# Patient Record
Sex: FEMALE | Race: WHITE | NOT HISPANIC OR LATINO
[De-identification: names, ages, dates, MRNs, and addresses within clinical notes are randomized per-mention and may not be internally consistent; named-entity substitution may affect disease eponyms.]

---

## 2016-12-06 RX ORDER — CEFAZOLIN SODIUM 1 G
2000 VIAL (EA) INJECTION ONCE
Qty: 0 | Refills: 0 | Status: DISCONTINUED | OUTPATIENT
Start: 2017-01-05 | End: 2017-01-20

## 2016-12-06 RX ORDER — SODIUM CHLORIDE 9 MG/ML
3 INJECTION INTRAMUSCULAR; INTRAVENOUS; SUBCUTANEOUS ONCE
Qty: 0 | Refills: 0 | Status: DISCONTINUED | OUTPATIENT
Start: 2017-01-05 | End: 2017-01-20

## 2017-01-03 ENCOUNTER — APPOINTMENT (OUTPATIENT)
Dept: INTERNAL MEDICINE | Facility: CLINIC | Age: 82
End: 2017-01-03

## 2017-01-03 VITALS
HEIGHT: 63 IN | DIASTOLIC BLOOD PRESSURE: 70 MMHG | BODY MASS INDEX: 21.62 KG/M2 | SYSTOLIC BLOOD PRESSURE: 130 MMHG | WEIGHT: 122 LBS

## 2017-01-03 VITALS
WEIGHT: 122 LBS | RESPIRATION RATE: 14 BRPM | DIASTOLIC BLOOD PRESSURE: 70 MMHG | HEIGHT: 63 IN | SYSTOLIC BLOOD PRESSURE: 130 MMHG | HEART RATE: 72 BPM | BODY MASS INDEX: 21.62 KG/M2

## 2017-01-04 NOTE — ASU PATIENT PROFILE, ADULT - LEARNING ASSESSMENT (PATIENT) ADDITIONAL COMMENTS
telephone update - 1-4-17 . pre op instructions & pain management reviewed pt verbalized understanding

## 2017-01-05 ENCOUNTER — OUTPATIENT (OUTPATIENT)
Dept: OUTPATIENT SERVICES | Facility: HOSPITAL | Age: 82
LOS: 1 days | End: 2017-01-05
Payer: MEDICARE

## 2017-01-05 ENCOUNTER — APPOINTMENT (OUTPATIENT)
Dept: SURGICAL ONCOLOGY | Facility: HOSPITAL | Age: 82
End: 2017-01-05

## 2017-01-05 VITALS
HEART RATE: 70 BPM | RESPIRATION RATE: 16 BRPM | SYSTOLIC BLOOD PRESSURE: 122 MMHG | DIASTOLIC BLOOD PRESSURE: 67 MMHG | OXYGEN SATURATION: 97 % | TEMPERATURE: 98 F

## 2017-01-05 VITALS
WEIGHT: 117.95 LBS | HEIGHT: 65 IN | DIASTOLIC BLOOD PRESSURE: 65 MMHG | TEMPERATURE: 98 F | SYSTOLIC BLOOD PRESSURE: 142 MMHG | HEART RATE: 70 BPM | RESPIRATION RATE: 16 BRPM

## 2017-01-05 DIAGNOSIS — Z98.890 OTHER SPECIFIED POSTPROCEDURAL STATES: Chronic | ICD-10-CM

## 2017-01-05 DIAGNOSIS — C80.1 MALIGNANT (PRIMARY) NEOPLASM, UNSPECIFIED: ICD-10-CM

## 2017-01-05 PROCEDURE — 15220 FTH/GFT FR S/A/L 20 SQ CM/<: CPT

## 2017-01-05 PROCEDURE — 15221 FTH/GFT FR S/A/L EACH ADDL: CPT

## 2017-01-05 PROCEDURE — 97605 NEG PRS WND THER DME<=50SQCM: CPT | Mod: 59

## 2017-01-05 PROCEDURE — 11606 EXC TR-EXT MAL+MARG >4 CM: CPT

## 2017-01-05 PROCEDURE — 88305 TISSUE EXAM BY PATHOLOGIST: CPT | Mod: 26

## 2017-01-05 PROCEDURE — 88305 TISSUE EXAM BY PATHOLOGIST: CPT

## 2017-01-05 RX ORDER — FENTANYL CITRATE 50 UG/ML
50 INJECTION INTRAVENOUS
Qty: 0 | Refills: 0 | Status: DISCONTINUED | OUTPATIENT
Start: 2017-01-05 | End: 2017-01-05

## 2017-01-05 RX ORDER — IBUPROFEN 200 MG
400 TABLET ORAL EVERY 6 HOURS
Qty: 0 | Refills: 0 | Status: DISCONTINUED | OUTPATIENT
Start: 2017-01-05 | End: 2017-01-20

## 2017-01-05 RX ORDER — ONDANSETRON 8 MG/1
4 TABLET, FILM COATED ORAL ONCE
Qty: 0 | Refills: 0 | Status: DISCONTINUED | OUTPATIENT
Start: 2017-01-05 | End: 2017-01-20

## 2017-01-05 RX ORDER — SODIUM CHLORIDE 9 MG/ML
1000 INJECTION, SOLUTION INTRAVENOUS
Qty: 0 | Refills: 0 | Status: DISCONTINUED | OUTPATIENT
Start: 2017-01-05 | End: 2017-01-20

## 2017-01-05 RX ORDER — SODIUM CHLORIDE 9 MG/ML
1000 INJECTION INTRAMUSCULAR; INTRAVENOUS; SUBCUTANEOUS
Qty: 0 | Refills: 0 | Status: DISCONTINUED | OUTPATIENT
Start: 2017-01-05 | End: 2017-01-05

## 2017-01-05 RX ORDER — IBUPROFEN 200 MG
1 TABLET ORAL
Qty: 0 | Refills: 0 | COMMUNITY
Start: 2017-01-05

## 2017-01-05 RX ORDER — ONDANSETRON 8 MG/1
4 TABLET, FILM COATED ORAL ONCE
Qty: 0 | Refills: 0 | Status: DISCONTINUED | OUTPATIENT
Start: 2017-01-05 | End: 2017-01-05

## 2017-01-05 NOTE — BRIEF OPERATIVE NOTE - PRE-OP DX
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

## 2017-01-05 NOTE — BRIEF OPERATIVE NOTE - PROCEDURE
Vacuum assisted closure therapy  01/05/2017  application vac -dressing.  Active  [facility]  Reconstruction procedure  01/05/2017  reconstruction of left lower leg wound with split thickness skin graft from right groin ( donor site)  Active  [facility]  Melanoma excision  01/05/2017  wide excision melanoma anterior left lower extremity  [facility] Excision squamous cell carcinoma of skin  01/05/2017  lesion anterior left lower extremity  Active  [facility]  Reconstruction procedure  01/05/2017  reconstruction of left lower leg wound with split thickness skin graft from right groin ( donor site)  Active  Satish Gunderson  Vacuum assisted closure therapy  01/05/2017  application vac -dressing.  Active  Satish Gundersno

## 2017-01-05 NOTE — BRIEF OPERATIVE NOTE - POST-OP DX
Melanoma of lower limb, left  01/05/2017    Active  Satish Gunderson Squamous cell carcinoma of lower leg, left  01/05/2017    Active  Satish Gunderson

## 2017-01-05 NOTE — ASU DISCHARGE PLAN (ADULT/PEDIATRIC). - NOTIFY
Swelling that continues/Bleeding that does not stop/Pain not relieved by Medications/Fever greater than 101

## 2017-01-05 NOTE — BRIEF OPERATIVE NOTE - SPECIMENS
1.melanoma left lower extremity. 2. deep tissue ( portion of for margins ) 1.lesion left lower extremity. 2. deep tissue ( portion of for margins )

## 2017-01-05 NOTE — ASU DISCHARGE PLAN (ADULT/PEDIATRIC). - MEDICATION SUMMARY - MEDICATIONS TO TAKE
I will START or STAY ON the medications listed below when I get home from the hospital:    ibuprofen 400 mg oral tablet  -- 1 tab(s) by mouth every 6 hours, As needed, pain  -- Indication: For Pain    Cardizem  mg/24 hours oral capsule, extended release  -- 1 cap(s) by mouth once a day  -- Indication: For Per PMD    hydroCHLOROthiazide 25 mg oral tablet  -- 1 tab(s) by mouth once a day  -- Indication: For Per PMD

## 2017-01-05 NOTE — ASU DISCHARGE PLAN (ADULT/PEDIATRIC). - PROCEDURE
wide excision lesion anterior left lower extyremity with recostruction left leg wound with STSG donor site right groin and application VAC dressing.

## 2017-01-10 ENCOUNTER — APPOINTMENT (OUTPATIENT)
Dept: PLASTIC SURGERY | Facility: CLINIC | Age: 82
End: 2017-01-10

## 2017-01-10 VITALS
TEMPERATURE: 97.6 F | SYSTOLIC BLOOD PRESSURE: 154 MMHG | HEART RATE: 98 BPM | BODY MASS INDEX: 21.62 KG/M2 | DIASTOLIC BLOOD PRESSURE: 78 MMHG | OXYGEN SATURATION: 90 % | RESPIRATION RATE: 16 BRPM | WEIGHT: 122 LBS | HEIGHT: 63 IN

## 2017-01-12 ENCOUNTER — APPOINTMENT (OUTPATIENT)
Dept: SURGICAL ONCOLOGY | Facility: CLINIC | Age: 82
End: 2017-01-12

## 2017-01-12 VITALS
WEIGHT: 118 LBS | SYSTOLIC BLOOD PRESSURE: 154 MMHG | RESPIRATION RATE: 14 BRPM | TEMPERATURE: 97.8 F | HEART RATE: 77 BPM | OXYGEN SATURATION: 98 % | DIASTOLIC BLOOD PRESSURE: 74 MMHG | HEIGHT: 63 IN | BODY MASS INDEX: 20.91 KG/M2

## 2017-01-23 ENCOUNTER — APPOINTMENT (OUTPATIENT)
Dept: INTERNAL MEDICINE | Facility: CLINIC | Age: 82
End: 2017-01-23

## 2017-01-24 ENCOUNTER — RX RENEWAL (OUTPATIENT)
Age: 82
End: 2017-01-24

## 2017-01-24 ENCOUNTER — APPOINTMENT (OUTPATIENT)
Dept: PLASTIC SURGERY | Facility: CLINIC | Age: 82
End: 2017-01-24

## 2017-01-24 VITALS
HEIGHT: 63 IN | HEART RATE: 66 BPM | TEMPERATURE: 98.1 F | SYSTOLIC BLOOD PRESSURE: 132 MMHG | RESPIRATION RATE: 14 BRPM | DIASTOLIC BLOOD PRESSURE: 65 MMHG | OXYGEN SATURATION: 98 %

## 2017-01-30 ENCOUNTER — APPOINTMENT (OUTPATIENT)
Dept: DERMATOLOGY | Facility: CLINIC | Age: 82
End: 2017-01-30

## 2017-02-21 ENCOUNTER — APPOINTMENT (OUTPATIENT)
Dept: PLASTIC SURGERY | Facility: CLINIC | Age: 82
End: 2017-02-21

## 2017-02-21 VITALS
TEMPERATURE: 97.7 F | OXYGEN SATURATION: 96 % | HEART RATE: 92 BPM | DIASTOLIC BLOOD PRESSURE: 81 MMHG | BODY MASS INDEX: 21.62 KG/M2 | SYSTOLIC BLOOD PRESSURE: 148 MMHG | HEIGHT: 63 IN | RESPIRATION RATE: 15 BRPM | WEIGHT: 122 LBS

## 2017-04-04 ENCOUNTER — APPOINTMENT (OUTPATIENT)
Dept: PLASTIC SURGERY | Facility: CLINIC | Age: 82
End: 2017-04-04

## 2017-04-04 VITALS
OXYGEN SATURATION: 98 % | SYSTOLIC BLOOD PRESSURE: 113 MMHG | HEIGHT: 63 IN | DIASTOLIC BLOOD PRESSURE: 74 MMHG | BODY MASS INDEX: 21.62 KG/M2 | WEIGHT: 122 LBS | TEMPERATURE: 97.5 F | HEART RATE: 75 BPM | RESPIRATION RATE: 16 BRPM

## 2017-04-25 ENCOUNTER — APPOINTMENT (OUTPATIENT)
Dept: PLASTIC SURGERY | Facility: CLINIC | Age: 82
End: 2017-04-25

## 2017-04-25 VITALS
HEIGHT: 63 IN | HEART RATE: 79 BPM | SYSTOLIC BLOOD PRESSURE: 137 MMHG | OXYGEN SATURATION: 98 % | TEMPERATURE: 97.6 F | WEIGHT: 122 LBS | BODY MASS INDEX: 21.62 KG/M2 | DIASTOLIC BLOOD PRESSURE: 81 MMHG | RESPIRATION RATE: 16 BRPM

## 2017-06-05 ENCOUNTER — APPOINTMENT (OUTPATIENT)
Dept: PLASTIC SURGERY | Facility: CLINIC | Age: 82
End: 2017-06-05

## 2017-06-05 VITALS
DIASTOLIC BLOOD PRESSURE: 71 MMHG | HEART RATE: 103 BPM | RESPIRATION RATE: 16 BRPM | WEIGHT: 121 LBS | TEMPERATURE: 98 F | SYSTOLIC BLOOD PRESSURE: 154 MMHG | OXYGEN SATURATION: 97 % | BODY MASS INDEX: 21.44 KG/M2 | HEIGHT: 63 IN

## 2017-06-05 DIAGNOSIS — L85.8 OTHER SPECIFIED EPIDERMAL THICKENING: ICD-10-CM

## 2017-06-08 ENCOUNTER — LABORATORY RESULT (OUTPATIENT)
Age: 82
End: 2017-06-08

## 2017-06-08 ENCOUNTER — APPOINTMENT (OUTPATIENT)
Dept: SURGICAL ONCOLOGY | Facility: CLINIC | Age: 82
End: 2017-06-08

## 2017-06-08 VITALS
TEMPERATURE: 98 F | WEIGHT: 121 LBS | HEART RATE: 80 BPM | SYSTOLIC BLOOD PRESSURE: 140 MMHG | RESPIRATION RATE: 16 BRPM | BODY MASS INDEX: 21.44 KG/M2 | OXYGEN SATURATION: 97 % | DIASTOLIC BLOOD PRESSURE: 80 MMHG | HEIGHT: 63 IN

## 2017-06-08 DIAGNOSIS — L98.9 DISORDER OF THE SKIN AND SUBCUTANEOUS TISSUE, UNSPECIFIED: ICD-10-CM

## 2017-06-08 PROBLEM — L85.8 KERATOACANTHOMA OF LOWER LEG: Status: ACTIVE | Noted: 2017-02-03

## 2017-06-22 ENCOUNTER — APPOINTMENT (OUTPATIENT)
Dept: SURGICAL ONCOLOGY | Facility: CLINIC | Age: 82
End: 2017-06-22

## 2017-06-22 VITALS
OXYGEN SATURATION: 98 % | BODY MASS INDEX: 21.26 KG/M2 | WEIGHT: 120 LBS | RESPIRATION RATE: 13 BRPM | DIASTOLIC BLOOD PRESSURE: 78 MMHG | HEART RATE: 62 BPM | SYSTOLIC BLOOD PRESSURE: 151 MMHG | TEMPERATURE: 97.6 F | HEIGHT: 63 IN

## 2017-06-22 DIAGNOSIS — Z85.89 PERSONAL HISTORY OF MALIGNANT NEOPLASM OF OTHER ORGANS AND SYSTEMS: ICD-10-CM

## 2017-07-06 ENCOUNTER — APPOINTMENT (OUTPATIENT)
Dept: MRI IMAGING | Facility: CLINIC | Age: 82
End: 2017-07-06

## 2017-07-06 ENCOUNTER — OUTPATIENT (OUTPATIENT)
Dept: OUTPATIENT SERVICES | Facility: HOSPITAL | Age: 82
LOS: 1 days | End: 2017-07-06

## 2017-07-06 ENCOUNTER — APPOINTMENT (OUTPATIENT)
Dept: INTERNAL MEDICINE | Facility: CLINIC | Age: 82
End: 2017-07-06

## 2017-07-06 VITALS
HEART RATE: 66 BPM | BODY MASS INDEX: 20.91 KG/M2 | DIASTOLIC BLOOD PRESSURE: 75 MMHG | SYSTOLIC BLOOD PRESSURE: 130 MMHG | HEIGHT: 63 IN | WEIGHT: 118 LBS

## 2017-07-06 DIAGNOSIS — Z00.8 ENCOUNTER FOR OTHER GENERAL EXAMINATION: ICD-10-CM

## 2017-07-06 DIAGNOSIS — Z87.898 PERSONAL HISTORY OF OTHER SPECIFIED CONDITIONS: ICD-10-CM

## 2017-07-06 DIAGNOSIS — Z01.818 ENCOUNTER FOR OTHER PREPROCEDURAL EXAMINATION: ICD-10-CM

## 2017-07-06 DIAGNOSIS — Z98.890 OTHER SPECIFIED POSTPROCEDURAL STATES: Chronic | ICD-10-CM

## 2017-07-06 DIAGNOSIS — E34.8 OTHER SPECIFIED ENDOCRINE DISORDERS: ICD-10-CM

## 2017-07-06 DIAGNOSIS — R29.3 ABNORMAL POSTURE: ICD-10-CM

## 2017-07-06 DIAGNOSIS — R60.0 LOCALIZED EDEMA: ICD-10-CM

## 2017-07-07 LAB
ALBUMIN SERPL ELPH-MCNC: 3.8 G/DL
ALP BLD-CCNC: 102 U/L
ALT SERPL-CCNC: 26 U/L
ANION GAP SERPL CALC-SCNC: 14 MMOL/L
AST SERPL-CCNC: 35 U/L
BASOPHILS # BLD AUTO: 0.06 K/UL
BASOPHILS NFR BLD AUTO: 1 %
BILIRUB SERPL-MCNC: 0.3 MG/DL
BUN SERPL-MCNC: 26 MG/DL
CALCIUM SERPL-MCNC: 9.9 MG/DL
CHLORIDE SERPL-SCNC: 101 MMOL/L
CHOLEST SERPL-MCNC: 182 MG/DL
CHOLEST/HDLC SERPL: 2.8 RATIO
CO2 SERPL-SCNC: 26 MMOL/L
CREAT SERPL-MCNC: 0.75 MG/DL
EOSINOPHIL # BLD AUTO: 0.14 K/UL
EOSINOPHIL NFR BLD AUTO: 2.4 %
GLUCOSE SERPL-MCNC: 116 MG/DL
HCT VFR BLD CALC: 40.9 %
HDLC SERPL-MCNC: 65 MG/DL
HGB BLD-MCNC: 13.2 G/DL
IMM GRANULOCYTES NFR BLD AUTO: 0.2 %
LDLC SERPL CALC-MCNC: 100 MG/DL
LYMPHOCYTES # BLD AUTO: 1.89 K/UL
LYMPHOCYTES NFR BLD AUTO: 33 %
MAGNESIUM SERPL-MCNC: 2.4 MG/DL
MAN DIFF?: NORMAL
MCHC RBC-ENTMCNC: 29.9 PG
MCHC RBC-ENTMCNC: 32.3 GM/DL
MCV RBC AUTO: 92.5 FL
MONOCYTES # BLD AUTO: 0.93 K/UL
MONOCYTES NFR BLD AUTO: 16.3 %
NEUTROPHILS # BLD AUTO: 2.69 K/UL
NEUTROPHILS NFR BLD AUTO: 47.1 %
PLATELET # BLD AUTO: 211 K/UL
POTASSIUM SERPL-SCNC: 4.5 MMOL/L
PROT SERPL-MCNC: 6.9 G/DL
RBC # BLD: 4.42 M/UL
RBC # FLD: 16.1 %
SODIUM SERPL-SCNC: 141 MMOL/L
TRIGL SERPL-MCNC: 86 MG/DL
TSH SERPL-ACNC: 2.3 UIU/ML
WBC # FLD AUTO: 5.72 K/UL

## 2017-07-21 ENCOUNTER — APPOINTMENT (OUTPATIENT)
Dept: DERMATOLOGY | Facility: CLINIC | Age: 82
End: 2017-07-21

## 2017-08-07 ENCOUNTER — RX RENEWAL (OUTPATIENT)
Age: 82
End: 2017-08-07

## 2017-08-15 ENCOUNTER — RX RENEWAL (OUTPATIENT)
Age: 82
End: 2017-08-15

## 2017-10-10 ENCOUNTER — APPOINTMENT (OUTPATIENT)
Dept: DERMATOLOGY | Facility: CLINIC | Age: 82
End: 2017-10-10

## 2017-10-13 ENCOUNTER — APPOINTMENT (OUTPATIENT)
Dept: DERMATOLOGY | Facility: CLINIC | Age: 82
End: 2017-10-13

## 2017-10-20 ENCOUNTER — APPOINTMENT (OUTPATIENT)
Dept: DERMATOLOGY | Facility: CLINIC | Age: 82
End: 2017-10-20
Payer: MEDICARE

## 2017-10-20 PROCEDURE — 99214 OFFICE O/P EST MOD 30 MIN: CPT

## 2017-10-23 ENCOUNTER — RESULT REVIEW (OUTPATIENT)
Age: 82
End: 2017-10-23

## 2017-10-24 ENCOUNTER — APPOINTMENT (OUTPATIENT)
Dept: DERMATOLOGY | Facility: CLINIC | Age: 82
End: 2017-10-24
Payer: MEDICARE

## 2017-10-24 PROCEDURE — 99213 OFFICE O/P EST LOW 20 MIN: CPT | Mod: 25

## 2017-10-24 PROCEDURE — 11100 BX SKIN SUBCUTANEOUS&/MUCOUS MEMBRANE 1 LESION: CPT

## 2017-10-28 ENCOUNTER — APPOINTMENT (OUTPATIENT)
Dept: DERMATOLOGY | Facility: CLINIC | Age: 82
End: 2017-10-28

## 2017-10-29 ENCOUNTER — FORM ENCOUNTER (OUTPATIENT)
Age: 82
End: 2017-10-29

## 2017-10-30 ENCOUNTER — APPOINTMENT (OUTPATIENT)
Dept: RADIOLOGY | Facility: CLINIC | Age: 82
End: 2017-10-30

## 2017-10-30 ENCOUNTER — OUTPATIENT (OUTPATIENT)
Dept: OUTPATIENT SERVICES | Facility: HOSPITAL | Age: 82
LOS: 1 days | End: 2017-10-30
Payer: MEDICARE

## 2017-10-30 ENCOUNTER — APPOINTMENT (OUTPATIENT)
Dept: INTERNAL MEDICINE | Facility: CLINIC | Age: 82
End: 2017-10-30
Payer: MEDICARE

## 2017-10-30 VITALS
SYSTOLIC BLOOD PRESSURE: 130 MMHG | DIASTOLIC BLOOD PRESSURE: 78 MMHG | HEART RATE: 69 BPM | BODY MASS INDEX: 20.91 KG/M2 | HEIGHT: 63 IN | WEIGHT: 118 LBS

## 2017-10-30 DIAGNOSIS — M25.552 PAIN IN LEFT HIP: ICD-10-CM

## 2017-10-30 DIAGNOSIS — Z98.890 OTHER SPECIFIED POSTPROCEDURAL STATES: Chronic | ICD-10-CM

## 2017-10-30 DIAGNOSIS — M54.5 LOW BACK PAIN: ICD-10-CM

## 2017-10-30 PROCEDURE — 99214 OFFICE O/P EST MOD 30 MIN: CPT

## 2017-10-30 PROCEDURE — 72110 X-RAY EXAM L-2 SPINE 4/>VWS: CPT | Mod: 26

## 2017-10-30 PROCEDURE — 73521 X-RAY EXAM HIPS BI 2 VIEWS: CPT

## 2017-10-30 PROCEDURE — 72110 X-RAY EXAM L-2 SPINE 4/>VWS: CPT

## 2017-10-30 PROCEDURE — 73521 X-RAY EXAM HIPS BI 2 VIEWS: CPT | Mod: 26

## 2017-10-31 ENCOUNTER — RESULT REVIEW (OUTPATIENT)
Age: 82
End: 2017-10-31

## 2017-11-01 ENCOUNTER — APPOINTMENT (OUTPATIENT)
Dept: ORTHOPEDIC SURGERY | Facility: CLINIC | Age: 82
End: 2017-11-01
Payer: MEDICARE

## 2017-11-01 VITALS
DIASTOLIC BLOOD PRESSURE: 74 MMHG | SYSTOLIC BLOOD PRESSURE: 138 MMHG | HEART RATE: 87 BPM | HEIGHT: 63 IN | WEIGHT: 118 LBS | BODY MASS INDEX: 20.91 KG/M2

## 2017-11-01 DIAGNOSIS — M25.552 PAIN IN LEFT HIP: ICD-10-CM

## 2017-11-01 PROCEDURE — 27197 CLSD TX PELVIC RING FX: CPT

## 2017-11-01 PROCEDURE — 73502 X-RAY EXAM HIP UNI 2-3 VIEWS: CPT | Mod: LT

## 2017-11-01 PROCEDURE — 99214 OFFICE O/P EST MOD 30 MIN: CPT | Mod: 25

## 2017-11-02 ENCOUNTER — APPOINTMENT (OUTPATIENT)
Dept: INTERNAL MEDICINE | Facility: CLINIC | Age: 82
End: 2017-11-02
Payer: MEDICARE

## 2017-11-02 ENCOUNTER — NON-APPOINTMENT (OUTPATIENT)
Age: 82
End: 2017-11-02

## 2017-11-02 VITALS
HEART RATE: 76 BPM | HEIGHT: 63 IN | BODY MASS INDEX: 19.84 KG/M2 | WEIGHT: 112 LBS | DIASTOLIC BLOOD PRESSURE: 80 MMHG | SYSTOLIC BLOOD PRESSURE: 130 MMHG | RESPIRATION RATE: 14 BRPM

## 2017-11-02 DIAGNOSIS — S32.121A: ICD-10-CM

## 2017-11-02 PROCEDURE — 36415 COLL VENOUS BLD VENIPUNCTURE: CPT

## 2017-11-02 PROCEDURE — 93000 ELECTROCARDIOGRAM COMPLETE: CPT

## 2017-11-02 PROCEDURE — G0439: CPT

## 2017-11-02 PROCEDURE — 90686 IIV4 VACC NO PRSV 0.5 ML IM: CPT

## 2017-11-02 PROCEDURE — G0008: CPT

## 2017-11-03 LAB
ALBUMIN SERPL ELPH-MCNC: 4.1 G/DL
ALP BLD-CCNC: 127 U/L
ALT SERPL-CCNC: 20 U/L
ANION GAP SERPL CALC-SCNC: 14 MMOL/L
APPEARANCE: ABNORMAL
AST SERPL-CCNC: 32 U/L
BACTERIA: ABNORMAL
BASOPHILS # BLD AUTO: 0.06 K/UL
BASOPHILS NFR BLD AUTO: 1.2 %
BILIRUB SERPL-MCNC: 0.3 MG/DL
BILIRUBIN URINE: NEGATIVE
BLOOD URINE: NEGATIVE
BUN SERPL-MCNC: 19 MG/DL
CALCIUM SERPL-MCNC: 9.6 MG/DL
CHLORIDE SERPL-SCNC: 98 MMOL/L
CHOLEST SERPL-MCNC: 183 MG/DL
CHOLEST/HDLC SERPL: 2.7 RATIO
CO2 SERPL-SCNC: 29 MMOL/L
COLOR: YELLOW
CREAT SERPL-MCNC: 0.68 MG/DL
EOSINOPHIL # BLD AUTO: 0.05 K/UL
EOSINOPHIL NFR BLD AUTO: 1 %
GLUCOSE QUALITATIVE U: NEGATIVE MG/DL
GLUCOSE SERPL-MCNC: 83 MG/DL
HCT VFR BLD CALC: 42.3 %
HDLC SERPL-MCNC: 67 MG/DL
HGB BLD-MCNC: 13.9 G/DL
HYALINE CASTS: 2 /LPF
IMM GRANULOCYTES NFR BLD AUTO: 0.2 %
KETONES URINE: NEGATIVE
LDLC SERPL CALC-MCNC: 105 MG/DL
LEUKOCYTE ESTERASE URINE: ABNORMAL
LYMPHOCYTES # BLD AUTO: 1.72 K/UL
LYMPHOCYTES NFR BLD AUTO: 35 %
MAGNESIUM SERPL-MCNC: 2.4 MG/DL
MAN DIFF?: NORMAL
MCHC RBC-ENTMCNC: 31.2 PG
MCHC RBC-ENTMCNC: 32.9 GM/DL
MCV RBC AUTO: 95.1 FL
MICROSCOPIC-UA: NORMAL
MONOCYTES # BLD AUTO: 0.76 K/UL
MONOCYTES NFR BLD AUTO: 15.4 %
NEUTROPHILS # BLD AUTO: 2.32 K/UL
NEUTROPHILS NFR BLD AUTO: 47.2 %
NITRITE URINE: NEGATIVE
PH URINE: 7
PLATELET # BLD AUTO: 247 K/UL
POTASSIUM SERPL-SCNC: 4.6 MMOL/L
PROT SERPL-MCNC: 7 G/DL
PROTEIN URINE: NEGATIVE MG/DL
RBC # BLD: 4.45 M/UL
RBC # FLD: 14.9 %
RED BLOOD CELLS URINE: 2 /HPF
SODIUM SERPL-SCNC: 141 MMOL/L
SPECIFIC GRAVITY URINE: 1.02
SQUAMOUS EPITHELIAL CELLS: 2 /HPF
TRIGL SERPL-MCNC: 55 MG/DL
TSH SERPL-ACNC: 2.33 UIU/ML
UROBILINOGEN URINE: 1 MG/DL
VIT B12 SERPL-MCNC: 872 PG/ML
WBC # FLD AUTO: 4.92 K/UL
WHITE BLOOD CELLS URINE: 570 /HPF

## 2017-11-22 ENCOUNTER — RX RENEWAL (OUTPATIENT)
Age: 82
End: 2017-11-22

## 2017-12-04 ENCOUNTER — APPOINTMENT (OUTPATIENT)
Dept: DERMATOLOGY | Facility: CLINIC | Age: 82
End: 2017-12-04
Payer: MEDICARE

## 2017-12-04 PROCEDURE — 99212 OFFICE O/P EST SF 10 MIN: CPT

## 2018-01-02 ENCOUNTER — APPOINTMENT (OUTPATIENT)
Dept: DERMATOLOGY | Facility: CLINIC | Age: 83
End: 2018-01-02

## 2018-01-22 ENCOUNTER — APPOINTMENT (OUTPATIENT)
Dept: DERMATOLOGY | Facility: CLINIC | Age: 83
End: 2018-01-22

## 2018-02-12 ENCOUNTER — APPOINTMENT (OUTPATIENT)
Dept: INTERNAL MEDICINE | Facility: CLINIC | Age: 83
End: 2018-02-12
Payer: MEDICARE

## 2018-02-12 VITALS
OXYGEN SATURATION: 98 % | BODY MASS INDEX: 19.84 KG/M2 | WEIGHT: 112 LBS | SYSTOLIC BLOOD PRESSURE: 130 MMHG | HEIGHT: 63 IN | DIASTOLIC BLOOD PRESSURE: 78 MMHG | HEART RATE: 78 BPM

## 2018-02-12 DIAGNOSIS — M79.1 MYALGIA: ICD-10-CM

## 2018-02-12 DIAGNOSIS — Z87.898 PERSONAL HISTORY OF OTHER SPECIFIED CONDITIONS: ICD-10-CM

## 2018-02-12 DIAGNOSIS — Z91.81 HISTORY OF FALLING: ICD-10-CM

## 2018-02-12 DIAGNOSIS — Z87.39 PERSONAL HISTORY OF OTHER DISEASES OF THE MUSCULOSKELETAL SYSTEM AND CONNECTIVE TISSUE: ICD-10-CM

## 2018-02-12 DIAGNOSIS — Z92.29 PERSONAL HISTORY OF OTHER DRUG THERAPY: ICD-10-CM

## 2018-02-12 DIAGNOSIS — L98.9 DISORDER OF THE SKIN AND SUBCUTANEOUS TISSUE, UNSPECIFIED: ICD-10-CM

## 2018-02-12 PROCEDURE — 36415 COLL VENOUS BLD VENIPUNCTURE: CPT

## 2018-02-12 PROCEDURE — 99214 OFFICE O/P EST MOD 30 MIN: CPT | Mod: 25

## 2018-02-13 ENCOUNTER — RESULT REVIEW (OUTPATIENT)
Age: 83
End: 2018-02-13

## 2018-02-13 LAB
ALBUMIN SERPL ELPH-MCNC: 4.2 G/DL
ALP BLD-CCNC: 114 U/L
ALT SERPL-CCNC: 28 U/L
ANION GAP SERPL CALC-SCNC: 20 MMOL/L
AST SERPL-CCNC: 32 U/L
BASOPHILS # BLD AUTO: 0.05 K/UL
BASOPHILS NFR BLD AUTO: 0.9 %
BILIRUB SERPL-MCNC: 0.4 MG/DL
BUN SERPL-MCNC: 20 MG/DL
CALCIUM SERPL-MCNC: 10.4 MG/DL
CHLORIDE SERPL-SCNC: 98 MMOL/L
CHOLEST SERPL-MCNC: 188 MG/DL
CHOLEST/HDLC SERPL: 2.5 RATIO
CO2 SERPL-SCNC: 25 MMOL/L
CREAT SERPL-MCNC: 0.72 MG/DL
EOSINOPHIL # BLD AUTO: 0.08 K/UL
EOSINOPHIL NFR BLD AUTO: 1.5 %
GLUCOSE SERPL-MCNC: 92 MG/DL
HCT VFR BLD CALC: 43.7 %
HDLC SERPL-MCNC: 75 MG/DL
HGB BLD-MCNC: 14 G/DL
IMM GRANULOCYTES NFR BLD AUTO: 0.2 %
LDLC SERPL CALC-MCNC: 98 MG/DL
LYMPHOCYTES # BLD AUTO: 1.88 K/UL
LYMPHOCYTES NFR BLD AUTO: 34.8 %
MAGNESIUM SERPL-MCNC: 2.2 MG/DL
MAN DIFF?: NORMAL
MCHC RBC-ENTMCNC: 30.2 PG
MCHC RBC-ENTMCNC: 32 GM/DL
MCV RBC AUTO: 94.4 FL
MONOCYTES # BLD AUTO: 0.65 K/UL
MONOCYTES NFR BLD AUTO: 12 %
NEUTROPHILS # BLD AUTO: 2.73 K/UL
NEUTROPHILS NFR BLD AUTO: 50.6 %
PLATELET # BLD AUTO: 224 K/UL
POTASSIUM SERPL-SCNC: 4.1 MMOL/L
PROT SERPL-MCNC: 7 G/DL
RBC # BLD: 4.63 M/UL
RBC # FLD: 16.1 %
SODIUM SERPL-SCNC: 143 MMOL/L
TRIGL SERPL-MCNC: 75 MG/DL
TSH SERPL-ACNC: 2.77 UIU/ML
WBC # FLD AUTO: 5.4 K/UL

## 2018-03-05 ENCOUNTER — RX RENEWAL (OUTPATIENT)
Age: 83
End: 2018-03-05

## 2018-03-11 ENCOUNTER — RESULT REVIEW (OUTPATIENT)
Age: 83
End: 2018-03-11

## 2018-03-12 ENCOUNTER — APPOINTMENT (OUTPATIENT)
Dept: DERMATOLOGY | Facility: CLINIC | Age: 83
End: 2018-03-12
Payer: MEDICARE

## 2018-03-12 PROCEDURE — 99213 OFFICE O/P EST LOW 20 MIN: CPT | Mod: 25

## 2018-03-12 PROCEDURE — 17261 DSTRJ MAL LES T/A/L .6-1.0CM: CPT | Mod: 59

## 2018-03-12 PROCEDURE — 17003 DESTRUCT PREMALG LES 2-14: CPT

## 2018-03-12 PROCEDURE — 17000 DESTRUCT PREMALG LESION: CPT

## 2018-03-12 PROCEDURE — 96405 CHEMO INTRALESIONAL UP TO 7: CPT | Mod: 59

## 2018-04-24 ENCOUNTER — RESULT REVIEW (OUTPATIENT)
Age: 83
End: 2018-04-24

## 2018-04-24 ENCOUNTER — APPOINTMENT (OUTPATIENT)
Dept: DERMATOLOGY | Facility: CLINIC | Age: 83
End: 2018-04-24
Payer: MEDICARE

## 2018-04-24 PROCEDURE — 17003 DESTRUCT PREMALG LES 2-14: CPT

## 2018-04-24 PROCEDURE — 17000 DESTRUCT PREMALG LESION: CPT

## 2018-04-24 PROCEDURE — 17262 DSTRJ MAL LES T/A/L 1.1-2.0: CPT | Mod: 59

## 2018-05-15 ENCOUNTER — APPOINTMENT (OUTPATIENT)
Dept: INTERNAL MEDICINE | Facility: CLINIC | Age: 83
End: 2018-05-15
Payer: MEDICARE

## 2018-05-15 VITALS
WEIGHT: 121 LBS | OXYGEN SATURATION: 95 % | SYSTOLIC BLOOD PRESSURE: 120 MMHG | HEART RATE: 90 BPM | HEIGHT: 63 IN | BODY MASS INDEX: 21.44 KG/M2 | DIASTOLIC BLOOD PRESSURE: 80 MMHG

## 2018-05-15 DIAGNOSIS — Z08 ENCOUNTER FOR FOLLOW-UP EXAMINATION AFTER COMPLETED TREATMENT FOR MALIGNANT NEOPLASM: ICD-10-CM

## 2018-05-15 DIAGNOSIS — Z85.828 ENCOUNTER FOR FOLLOW-UP EXAMINATION AFTER COMPLETED TREATMENT FOR MALIGNANT NEOPLASM: ICD-10-CM

## 2018-05-15 PROCEDURE — 99214 OFFICE O/P EST MOD 30 MIN: CPT | Mod: 25

## 2018-05-15 PROCEDURE — 36415 COLL VENOUS BLD VENIPUNCTURE: CPT

## 2018-05-16 ENCOUNTER — RESULT REVIEW (OUTPATIENT)
Age: 83
End: 2018-05-16

## 2018-05-16 LAB
ALBUMIN SERPL ELPH-MCNC: 4.1 G/DL
ALP BLD-CCNC: 90 U/L
ALT SERPL-CCNC: 31 U/L
ANION GAP SERPL CALC-SCNC: 12 MMOL/L
AST SERPL-CCNC: 37 U/L
BASOPHILS # BLD AUTO: 0.04 K/UL
BASOPHILS NFR BLD AUTO: 0.9 %
BILIRUB SERPL-MCNC: 0.4 MG/DL
BUN SERPL-MCNC: 23 MG/DL
CALCIUM SERPL-MCNC: 9.7 MG/DL
CHLORIDE SERPL-SCNC: 99 MMOL/L
CHOLEST SERPL-MCNC: 190 MG/DL
CHOLEST/HDLC SERPL: 2.7 RATIO
CO2 SERPL-SCNC: 29 MMOL/L
CREAT SERPL-MCNC: 0.62 MG/DL
EOSINOPHIL # BLD AUTO: 0.07 K/UL
EOSINOPHIL NFR BLD AUTO: 1.5 %
GLUCOSE SERPL-MCNC: 97 MG/DL
HCT VFR BLD CALC: 40.9 %
HDLC SERPL-MCNC: 70 MG/DL
HGB BLD-MCNC: 13.6 G/DL
IMM GRANULOCYTES NFR BLD AUTO: 0.2 %
LDLC SERPL CALC-MCNC: 105 MG/DL
LYMPHOCYTES # BLD AUTO: 1.68 K/UL
LYMPHOCYTES NFR BLD AUTO: 37 %
MAGNESIUM SERPL-MCNC: 2.3 MG/DL
MAN DIFF?: NORMAL
MCHC RBC-ENTMCNC: 30.7 PG
MCHC RBC-ENTMCNC: 33.3 GM/DL
MCV RBC AUTO: 92.3 FL
MONOCYTES # BLD AUTO: 0.59 K/UL
MONOCYTES NFR BLD AUTO: 13 %
NEUTROPHILS # BLD AUTO: 2.15 K/UL
NEUTROPHILS NFR BLD AUTO: 47.4 %
PLATELET # BLD AUTO: 210 K/UL
POTASSIUM SERPL-SCNC: 3.9 MMOL/L
PROT SERPL-MCNC: 6.8 G/DL
RBC # BLD: 4.43 M/UL
RBC # FLD: 16.7 %
SODIUM SERPL-SCNC: 140 MMOL/L
TRIGL SERPL-MCNC: 75 MG/DL
TSH SERPL-ACNC: 2.09 UIU/ML
WBC # FLD AUTO: 4.54 K/UL

## 2018-07-03 ENCOUNTER — APPOINTMENT (OUTPATIENT)
Dept: INTERNAL MEDICINE | Facility: CLINIC | Age: 83
End: 2018-07-03
Payer: MEDICARE

## 2018-07-03 VITALS — HEART RATE: 75 BPM | DIASTOLIC BLOOD PRESSURE: 80 MMHG | SYSTOLIC BLOOD PRESSURE: 120 MMHG

## 2018-07-03 DIAGNOSIS — R26.89 OTHER ABNORMALITIES OF GAIT AND MOBILITY: ICD-10-CM

## 2018-07-03 PROCEDURE — 99214 OFFICE O/P EST MOD 30 MIN: CPT

## 2018-07-06 ENCOUNTER — APPOINTMENT (OUTPATIENT)
Dept: INTERNAL MEDICINE | Facility: CLINIC | Age: 83
End: 2018-07-06
Payer: MEDICARE

## 2018-07-06 VITALS — HEART RATE: 70 BPM | SYSTOLIC BLOOD PRESSURE: 124 MMHG | DIASTOLIC BLOOD PRESSURE: 80 MMHG

## 2018-07-06 PROCEDURE — 99213 OFFICE O/P EST LOW 20 MIN: CPT

## 2018-07-10 ENCOUNTER — APPOINTMENT (OUTPATIENT)
Dept: INTERNAL MEDICINE | Facility: CLINIC | Age: 83
End: 2018-07-10
Payer: MEDICARE

## 2018-07-10 VITALS — SYSTOLIC BLOOD PRESSURE: 120 MMHG | HEART RATE: 69 BPM | DIASTOLIC BLOOD PRESSURE: 80 MMHG

## 2018-07-10 PROCEDURE — 99213 OFFICE O/P EST LOW 20 MIN: CPT

## 2018-08-13 ENCOUNTER — APPOINTMENT (OUTPATIENT)
Dept: INTERNAL MEDICINE | Facility: CLINIC | Age: 83
End: 2018-08-13
Payer: MEDICARE

## 2018-08-13 VITALS
HEIGHT: 63 IN | SYSTOLIC BLOOD PRESSURE: 125 MMHG | BODY MASS INDEX: 21.44 KG/M2 | HEART RATE: 70 BPM | DIASTOLIC BLOOD PRESSURE: 75 MMHG | WEIGHT: 121 LBS

## 2018-08-13 DIAGNOSIS — R07.89 OTHER CHEST PAIN: ICD-10-CM

## 2018-08-13 PROCEDURE — 99214 OFFICE O/P EST MOD 30 MIN: CPT

## 2018-08-14 NOTE — ASSESSMENT
[FreeTextEntry1] : No signs of cardiac involvement/dyspnea, CT of the chest ordered to further evaluate for sternal fracture or any pathology. Patient is to take Tylenol and apply ice and heat to the area. Next step will be decided after imaging/ progress with Tylenol. Patient will call if symptoms persist or worsen and return to the office as scheduled

## 2018-08-14 NOTE — HISTORY OF PRESENT ILLNESS
[FreeTextEntry8] : Patient presents complaining of sternal pain. Patient states she fell about 6 days ago and it started after that. Patient states she was standing and lost her footing/balance and fell back in a chair and her chin hit into her chest, "made my mouth shut" and she felt discomfort in her sternum. Patient denies palpitations/shortness of breath/dyspnea and states it does not feel like her heart, UTD with cardio. Patient states symptoms are only problematic with leaning back or any kind of movement. Patient states symptoms are about the same without progression. Patient had no head trauma or any other injury from the fall. Patient has not tried any over-the-counter medication

## 2018-08-14 NOTE — PHYSICAL EXAM
[No Acute Distress] : no acute distress [No Respiratory Distress] : no respiratory distress  [Clear to Auscultation] : lungs were clear to auscultation bilaterally [Normal Rate] : normal rate  [Regular Rhythm] : with a regular rhythm [No Focal Deficits] : no focal deficits [Normal Affect] : the affect was normal [Normal Mood] : the mood was normal [de-identified] : +murmur, +++Reproducible sternal discomfort with palpation, no gross bony deformity and no bruising noted,Symptoms are worse with movement up-and-down the table and leaning back

## 2018-08-17 ENCOUNTER — APPOINTMENT (OUTPATIENT)
Dept: INTERNAL MEDICINE | Facility: CLINIC | Age: 83
End: 2018-08-17
Payer: MEDICARE

## 2018-08-17 VITALS
WEIGHT: 121 LBS | HEIGHT: 63 IN | SYSTOLIC BLOOD PRESSURE: 120 MMHG | DIASTOLIC BLOOD PRESSURE: 80 MMHG | BODY MASS INDEX: 21.44 KG/M2 | HEART RATE: 74 BPM

## 2018-08-17 DIAGNOSIS — R29.6 REPEATED FALLS: ICD-10-CM

## 2018-08-17 PROCEDURE — 99214 OFFICE O/P EST MOD 30 MIN: CPT | Mod: 25

## 2018-08-17 PROCEDURE — 36415 COLL VENOUS BLD VENIPUNCTURE: CPT

## 2018-08-17 NOTE — ASSESSMENT
[FreeTextEntry1] : Labs will be sent out and  further recommendations will be made based on lab results. Patient advised to continue present medications with diet/exercise and specialist followup.RTO in 3-4months for CPE\par **referral again given for balance therapy\par \par Shingrix vaccine d/w pt\par Last bone density was January of 2015-"to sched follow up soon"\par Last colonoscopy was 09, no further colonoscopies needed\par Declines gynecology exam\par  specialists  include\par 1.  endocrinology q. year-Dr. Song-NO LONGER GOES\par 2. dermatology-Dr. Cope\par 3.podiatry-DELCINES\par 4. ophthalmology.-Dr. Naranjo\par 5.cardiology-Dr. Alexis, echo 4/18\par 6.plastic surgeon-Dr. Borjas-NO NEED FOR FOLLOW UP\par 7. Surgical oncologist-Dr. Lucia-NO need for follow up\par 8. Hand specialist-Dr. Karolina colon\par 9.ortho-Dr. De Leon\par thyroid sonogram-"to do follow up soon"\par mammogram was 12/15-"to do follow up soon"\par Echo was 4/18\par Declines rheumatology evaluation to investigate + LAVINIA\par Declines HIV screening/has been tested for hepatitis C in the past

## 2018-08-17 NOTE — HISTORY OF PRESENT ILLNESS
[FreeTextEntry1] : Patient presents for followup on hypertension/hyperlipidemia/thyroid nodule. Patient is currently fasting for today's labs/no complaints.Patient is currently on diltiazem/hydrochlorothiazide for hypertension, is controlling her cholesterol dietarily and overdue for thyroid sonogram to monitor thyroid nodule\par -see prior note, chest pain is much better, CT chest/cardio check was normal\par \par

## 2018-08-21 ENCOUNTER — RESULT REVIEW (OUTPATIENT)
Age: 83
End: 2018-08-21

## 2018-08-21 LAB
ALBUMIN SERPL ELPH-MCNC: 4.1 G/DL
ALP BLD-CCNC: 94 U/L
ALT SERPL-CCNC: 26 U/L
ANION GAP SERPL CALC-SCNC: 13 MMOL/L
AST SERPL-CCNC: 29 U/L
BASOPHILS # BLD AUTO: 0.08 K/UL
BASOPHILS NFR BLD AUTO: 1.5 %
BILIRUB SERPL-MCNC: 0.3 MG/DL
BUN SERPL-MCNC: 20 MG/DL
CALCIUM SERPL-MCNC: 9.9 MG/DL
CHLORIDE SERPL-SCNC: 100 MMOL/L
CHOLEST SERPL-MCNC: 159 MG/DL
CHOLEST/HDLC SERPL: 2.7 RATIO
CO2 SERPL-SCNC: 29 MMOL/L
CREAT SERPL-MCNC: 0.6 MG/DL
EOSINOPHIL # BLD AUTO: 0.13 K/UL
EOSINOPHIL NFR BLD AUTO: 2.5 %
GLUCOSE SERPL-MCNC: 100 MG/DL
HCT VFR BLD CALC: 41.3 %
HDLC SERPL-MCNC: 58 MG/DL
HGB BLD-MCNC: 13.3 G/DL
IMM GRANULOCYTES NFR BLD AUTO: 0.2 %
LDLC SERPL CALC-MCNC: 86 MG/DL
LYMPHOCYTES # BLD AUTO: 1.75 K/UL
LYMPHOCYTES NFR BLD AUTO: 33.7 %
MAGNESIUM SERPL-MCNC: 2.3 MG/DL
MAN DIFF?: NORMAL
MCHC RBC-ENTMCNC: 30.6 PG
MCHC RBC-ENTMCNC: 32.2 GM/DL
MCV RBC AUTO: 95.2 FL
MONOCYTES # BLD AUTO: 0.7 K/UL
MONOCYTES NFR BLD AUTO: 13.5 %
NEUTROPHILS # BLD AUTO: 2.53 K/UL
NEUTROPHILS NFR BLD AUTO: 48.6 %
PLATELET # BLD AUTO: 209 K/UL
POTASSIUM SERPL-SCNC: 4.2 MMOL/L
PROT SERPL-MCNC: 6.3 G/DL
RBC # BLD: 4.34 M/UL
RBC # FLD: 15.9 %
SODIUM SERPL-SCNC: 142 MMOL/L
TRIGL SERPL-MCNC: 75 MG/DL
TSH SERPL-ACNC: 2.72 UIU/ML
WBC # FLD AUTO: 5.2 K/UL

## 2018-08-23 ENCOUNTER — OUTPATIENT (OUTPATIENT)
Dept: OUTPATIENT SERVICES | Facility: HOSPITAL | Age: 83
LOS: 1 days | End: 2018-08-23
Payer: MEDICARE

## 2018-08-23 DIAGNOSIS — Z51.89 ENCOUNTER FOR OTHER SPECIFIED AFTERCARE: ICD-10-CM

## 2018-08-23 DIAGNOSIS — Z98.890 OTHER SPECIFIED POSTPROCEDURAL STATES: Chronic | ICD-10-CM

## 2018-08-23 DIAGNOSIS — R29.6 REPEATED FALLS: ICD-10-CM

## 2018-08-23 DIAGNOSIS — R26.89 OTHER ABNORMALITIES OF GAIT AND MOBILITY: ICD-10-CM

## 2018-08-23 DIAGNOSIS — R29.3 ABNORMAL POSTURE: ICD-10-CM

## 2018-10-05 ENCOUNTER — RX RENEWAL (OUTPATIENT)
Age: 83
End: 2018-10-05

## 2018-10-22 ENCOUNTER — APPOINTMENT (OUTPATIENT)
Dept: DERMATOLOGY | Facility: CLINIC | Age: 83
End: 2018-10-22
Payer: MEDICARE

## 2018-10-22 ENCOUNTER — RESULT REVIEW (OUTPATIENT)
Age: 83
End: 2018-10-22

## 2018-10-22 PROCEDURE — 17110 DESTRUCTION B9 LES UP TO 14: CPT

## 2018-10-22 PROCEDURE — 17271 DSTR MAL LES S/N/H/F/G 0.6-1: CPT | Mod: 59

## 2018-10-22 PROCEDURE — 99213 OFFICE O/P EST LOW 20 MIN: CPT | Mod: 25

## 2018-10-25 ENCOUNTER — APPOINTMENT (OUTPATIENT)
Dept: DERMATOLOGY | Facility: CLINIC | Age: 83
End: 2018-10-25

## 2018-11-14 PROCEDURE — G8979: CPT | Mod: CJ

## 2018-11-14 PROCEDURE — 97116 GAIT TRAINING THERAPY: CPT

## 2018-11-14 PROCEDURE — 97163 PT EVAL HIGH COMPLEX 45 MIN: CPT

## 2018-11-14 PROCEDURE — 97112 NEUROMUSCULAR REEDUCATION: CPT | Mod: KX

## 2018-11-14 PROCEDURE — 97110 THERAPEUTIC EXERCISES: CPT | Mod: KX

## 2018-11-14 PROCEDURE — G8978: CPT | Mod: CK

## 2018-11-14 PROCEDURE — G8980: CPT | Mod: CJ

## 2018-11-26 DIAGNOSIS — Z51.89 ENCOUNTER FOR OTHER SPECIFIED AFTERCARE: ICD-10-CM

## 2018-11-26 DIAGNOSIS — Z91.81 HISTORY OF FALLING: ICD-10-CM

## 2018-11-26 DIAGNOSIS — Z09 ENCOUNTER FOR FOLLOW-UP EXAMINATION AFTER COMPLETED TREATMENT FOR CONDITIONS OTHER THAN MALIGNANT NEOPLASM: ICD-10-CM

## 2018-11-26 DIAGNOSIS — S81.802A UNSPECIFIED OPEN WOUND, LEFT LOWER LEG, INITIAL ENCOUNTER: ICD-10-CM

## 2018-11-26 DIAGNOSIS — T14.8XXA OTHER INJURY OF UNSPECIFIED BODY REGION, INITIAL ENCOUNTER: ICD-10-CM

## 2018-11-28 ENCOUNTER — NON-APPOINTMENT (OUTPATIENT)
Age: 83
End: 2018-11-28

## 2018-11-28 ENCOUNTER — APPOINTMENT (OUTPATIENT)
Dept: INTERNAL MEDICINE | Facility: CLINIC | Age: 83
End: 2018-11-28
Payer: MEDICARE

## 2018-11-28 VITALS
BODY MASS INDEX: 21.97 KG/M2 | RESPIRATION RATE: 14 BRPM | HEIGHT: 63 IN | HEART RATE: 92 BPM | WEIGHT: 124 LBS | SYSTOLIC BLOOD PRESSURE: 120 MMHG | DIASTOLIC BLOOD PRESSURE: 76 MMHG

## 2018-11-28 DIAGNOSIS — I34.0 NONRHEUMATIC MITRAL (VALVE) INSUFFICIENCY: ICD-10-CM

## 2018-11-28 PROCEDURE — G0008: CPT

## 2018-11-28 PROCEDURE — 93000 ELECTROCARDIOGRAM COMPLETE: CPT

## 2018-11-28 PROCEDURE — 90662 IIV NO PRSV INCREASED AG IM: CPT

## 2018-11-28 PROCEDURE — 36415 COLL VENOUS BLD VENIPUNCTURE: CPT

## 2018-11-28 PROCEDURE — G0439: CPT

## 2018-11-29 ENCOUNTER — APPOINTMENT (OUTPATIENT)
Dept: DERMATOLOGY | Facility: CLINIC | Age: 83
End: 2018-11-29

## 2018-11-29 LAB
ALBUMIN SERPL ELPH-MCNC: 4.1 G/DL
ALP BLD-CCNC: 92 U/L
ALT SERPL-CCNC: 58 U/L
ANION GAP SERPL CALC-SCNC: 10 MMOL/L
APPEARANCE: CLEAR
AST SERPL-CCNC: 43 U/L
BACTERIA: ABNORMAL
BASOPHILS # BLD AUTO: 0.03 K/UL
BASOPHILS NFR BLD AUTO: 0.4 %
BILIRUB SERPL-MCNC: 0.6 MG/DL
BILIRUBIN URINE: NEGATIVE
BLOOD URINE: NEGATIVE
BUN SERPL-MCNC: 24 MG/DL
CALCIUM SERPL-MCNC: 9.2 MG/DL
CHLORIDE SERPL-SCNC: 103 MMOL/L
CHOLEST SERPL-MCNC: 156 MG/DL
CHOLEST/HDLC SERPL: 2.7 RATIO
CO2 SERPL-SCNC: 28 MMOL/L
COLOR: YELLOW
CREAT SERPL-MCNC: 0.56 MG/DL
EOSINOPHIL # BLD AUTO: 0.03 K/UL
EOSINOPHIL NFR BLD AUTO: 0.4 %
GLUCOSE QUALITATIVE U: NEGATIVE MG/DL
GLUCOSE SERPL-MCNC: 94 MG/DL
HCT VFR BLD CALC: 41.4 %
HDLC SERPL-MCNC: 58 MG/DL
HGB BLD-MCNC: 13.2 G/DL
HYALINE CASTS: 5 /LPF
IMM GRANULOCYTES NFR BLD AUTO: 0.3 %
KETONES URINE: NEGATIVE
LDLC SERPL CALC-MCNC: 86 MG/DL
LEUKOCYTE ESTERASE URINE: NEGATIVE
LYMPHOCYTES # BLD AUTO: 1.44 K/UL
LYMPHOCYTES NFR BLD AUTO: 19.9 %
MAGNESIUM SERPL-MCNC: 2 MG/DL
MAN DIFF?: NORMAL
MCHC RBC-ENTMCNC: 30.6 PG
MCHC RBC-ENTMCNC: 31.9 GM/DL
MCV RBC AUTO: 95.8 FL
MICROSCOPIC-UA: NORMAL
MONOCYTES # BLD AUTO: 1.01 K/UL
MONOCYTES NFR BLD AUTO: 13.9 %
NEUTROPHILS # BLD AUTO: 4.72 K/UL
NEUTROPHILS NFR BLD AUTO: 65.1 %
NITRITE URINE: POSITIVE
PH URINE: 6
PLATELET # BLD AUTO: 204 K/UL
POTASSIUM SERPL-SCNC: 4.2 MMOL/L
PROT SERPL-MCNC: 6.3 G/DL
PROTEIN URINE: 30 MG/DL
RBC # BLD: 4.32 M/UL
RBC # FLD: 15.9 %
RED BLOOD CELLS URINE: 1 /HPF
SODIUM SERPL-SCNC: 141 MMOL/L
SPECIFIC GRAVITY URINE: 1.02
SQUAMOUS EPITHELIAL CELLS: 7 /HPF
T4 FREE SERPL-MCNC: 1.3 NG/DL
TRIGL SERPL-MCNC: 60 MG/DL
TSH SERPL-ACNC: 2.51 UIU/ML
UROBILINOGEN URINE: 1 MG/DL
WBC # FLD AUTO: 7.25 K/UL
WHITE BLOOD CELLS URINE: 10 /HPF

## 2018-11-29 NOTE — HEALTH RISK ASSESSMENT
[Good] : ~his/her~ current health as good [Fair] :  ~his/her~ mood as fair [No falls in past year] : Patient reported no falls in the past year [0] : 2) Feeling down, depressed, or hopeless: Not at all (0) [None] : None [Alone] : lives alone [Retired] : retired [High School] : high school [] :  [# Of Children ___] : has [unfilled] children [Feels Safe at Home] : Feels safe at home [Fully functional (bathing, dressing, toileting, transferring, walking, feeding)] : Fully functional (bathing, dressing, toileting, transferring, walking, feeding) [Fully functional (using the telephone, shopping, preparing meals, housekeeping, doing laundry, using] : Fully functional and needs no help or supervision to perform IADLs (using the telephone, shopping, preparing meals, housekeeping, doing laundry, using transportation, managing medications and managing finances) [Smoke Detector] : smoke detector [Carbon Monoxide Detector] : carbon monoxide detector [Seat Belt] :  uses seat belt [Sunscreen] : uses sunscreen [Discussed at today's visit] : Advance Directives Discussed at today's visit [Patient reported colonoscopy was normal] : Patient reported colonoscopy was normal [] : No [de-identified] : occ [[id number]] : 0 [Change in mental status noted] : No change in mental status noted [Sexually Active] : not sexually active [Reports changes in hearing] : Reports no changes in hearing [Reports changes in vision] : Reports no changes in vision [Reports changes in dental health] : Reports no changes in dental health [ColonoscopyDate] : 10/09

## 2018-11-29 NOTE — HISTORY OF PRESENT ILLNESS
[FreeTextEntry1] : 83-year-old female with history of moderate aortic stenosis, hypertension, hyperlipidemia presents for her yearly physical.\par \par The patient generally has been well but has had significant issues over the past year.\par \par The patient has had issues with squamous cell cancers on her right leg and she now states that she has one on her right temporal area.\par \par July 2017 the patient had a syncopal episode and was seen here without any deficits.\par MRI of the brain was negative\par Patient was referred to cardiology for evaluation\par \par The patient fell about 1 year ago off a stool with trauma to her buttock and sustaining a left pelvic fracture which is nondisplaced.She has recovered from this well without any further falls.\par \par Otherwise no chest pain, palpitations, shortness of breath or edema.\par \par

## 2018-11-29 NOTE — ASSESSMENT
[FreeTextEntry1] : 83-year-old female with history of moderately severe aortic stenosis, hypertension, and hyperlipidemia, now found to be in new onset atrial fibrillation.\par Patient's heart rate is not rapid therefore, not felt to need the ED an appointment for cardiology made today.\par \par Patient is status post fall with pelvic fracture 1 year ago, with good recovery\par \par Skin cancers for which she has followed up with dermatology.\par \par Mammography December 2015. Referral given again\par Colonoscopy 2009\par Bone density January 2015. Referral given\par \par Influenza vaccine given left deltoid\par All of the vaccines up-to-date\par \par Followup in 3to4 months.

## 2018-11-29 NOTE — DATA REVIEWED
[FreeTextEntry1] : EKG-new onset atrial fibrillation with controlled ventricular response with nonspecific ST-T changes

## 2018-11-29 NOTE — PHYSICAL EXAM
[General Appearance - Alert] : alert [General Appearance - In No Acute Distress] : in no acute distress [Sclera] : the sclera and conjunctiva were normal [PERRL With Normal Accommodation] : pupils were equal in size, round, and reactive to light [Extraocular Movements] : extraocular movements were intact [Outer Ear] : the ears and nose were normal in appearance [Oropharynx] : the oropharynx was normal [Neck Appearance] : the appearance of the neck was normal [Neck Cervical Mass (___cm)] : no neck mass was observed [Jugular Venous Distention Increased] : there was no jugular-venous distention [Thyroid Diffuse Enlargement] : the thyroid was not enlarged [Thyroid Nodule] : there were no palpable thyroid nodules [Auscultation Breath Sounds / Voice Sounds] : lungs were clear to auscultation bilaterally [Heart Rate And Rhythm] : heart rate was normal and rhythm regular [Heart Sounds] : normal S1 and S2 [Heart Sounds Gallop] : no gallops [Heart Sounds Pericardial Friction Rub] : no pericardial rub [Systolic grade ___/6] : A grade [unfilled]/6 systolic murmur was heard. [Abdominal Aorta] : the abdominal aorta was normal [Arterial Pulses Femoral] : femoral pulses were normal without bruits [Full Pulse] : the pedal pulses are present [Edema] : there was no peripheral edema [Breast Appearance] : normal in appearance [Breast Palpation Mass] : no palpable masses [Breast Abnormal Lactation (Galactorrhea)] : no nipple discharge [Bowel Sounds] : normal bowel sounds [Abdomen Soft] : soft [Abdomen Tenderness] : non-tender [Abdomen Mass (___ Cm)] : no abdominal mass palpated [Cervical Lymph Nodes Enlarged Posterior Bilaterally] : posterior cervical [Cervical Lymph Nodes Enlarged Anterior Bilaterally] : anterior cervical [Supraclavicular Lymph Nodes Enlarged Bilaterally] : supraclavicular [Axillary Lymph Nodes Enlarged Bilaterally] : axillary [Femoral Lymph Nodes Enlarged Bilaterally] : femoral [Inguinal Lymph Nodes Enlarged Bilaterally] : inguinal [No Spinal Tenderness] : no spinal tenderness [Abnormal Walk] : normal gait [Nail Clubbing] : no clubbing  or cyanosis of the fingernails [Musculoskeletal - Swelling] : no joint swelling seen [Motor Tone] : muscle strength and tone were normal [Skin Color & Pigmentation] : normal skin color and pigmentation [Skin Turgor] : normal skin turgor [] : no rash [No Focal Deficits] : no focal deficits [Oriented To Time, Place, And Person] : oriented to person, place, and time [Impaired Insight] : insight and judgment were intact [Affect] : the affect was normal [FreeTextEntry1] : Ambulates with a cane

## 2019-01-04 ENCOUNTER — OUTPATIENT (OUTPATIENT)
Dept: OUTPATIENT SERVICES | Facility: HOSPITAL | Age: 84
LOS: 1 days | Discharge: HOME | End: 2019-01-04
Payer: MEDICARE

## 2019-01-04 ENCOUNTER — TRANSCRIPTION ENCOUNTER (OUTPATIENT)
Age: 84
End: 2019-01-04

## 2019-01-04 VITALS
TEMPERATURE: 97 F | OXYGEN SATURATION: 93 % | SYSTOLIC BLOOD PRESSURE: 139 MMHG | RESPIRATION RATE: 20 BRPM | HEART RATE: 129 BPM | DIASTOLIC BLOOD PRESSURE: 92 MMHG

## 2019-01-04 DIAGNOSIS — H26.40 UNSPECIFIED SECONDARY CATARACT: Chronic | ICD-10-CM

## 2019-01-04 DIAGNOSIS — Z98.890 OTHER SPECIFIED POSTPROCEDURAL STATES: Chronic | ICD-10-CM

## 2019-01-04 DIAGNOSIS — I48.91 UNSPECIFIED ATRIAL FIBRILLATION: ICD-10-CM

## 2019-01-04 LAB
ANION GAP SERPL CALC-SCNC: 12 MMOL/L — SIGNIFICANT CHANGE UP (ref 5–17)
APTT BLD: 31.6 SEC — SIGNIFICANT CHANGE UP (ref 27.5–36.3)
BUN SERPL-MCNC: 20 MG/DL — SIGNIFICANT CHANGE UP (ref 8–20)
CALCIUM SERPL-MCNC: 8.8 MG/DL — SIGNIFICANT CHANGE UP (ref 8.6–10.2)
CHLORIDE SERPL-SCNC: 98 MMOL/L — SIGNIFICANT CHANGE UP (ref 98–107)
CO2 SERPL-SCNC: 30 MMOL/L — HIGH (ref 22–29)
CREAT SERPL-MCNC: 0.57 MG/DL — SIGNIFICANT CHANGE UP (ref 0.5–1.3)
GLUCOSE SERPL-MCNC: 91 MG/DL — SIGNIFICANT CHANGE UP (ref 70–115)
HCT VFR BLD CALC: 42.5 % — SIGNIFICANT CHANGE UP (ref 37–47)
HGB BLD-MCNC: 13.7 G/DL — SIGNIFICANT CHANGE UP (ref 12–16)
INR BLD: 1.4 RATIO — HIGH (ref 0.88–1.16)
MCHC RBC-ENTMCNC: 29.2 PG — SIGNIFICANT CHANGE UP (ref 27–31)
MCHC RBC-ENTMCNC: 32.2 G/DL — SIGNIFICANT CHANGE UP (ref 32–36)
MCV RBC AUTO: 90.6 FL — SIGNIFICANT CHANGE UP (ref 81–99)
PLATELET # BLD AUTO: 247 K/UL — SIGNIFICANT CHANGE UP (ref 150–400)
POTASSIUM SERPL-MCNC: 3.3 MMOL/L — LOW (ref 3.5–5.3)
POTASSIUM SERPL-SCNC: 3.3 MMOL/L — LOW (ref 3.5–5.3)
PROTHROM AB SERPL-ACNC: 16.3 SEC — HIGH (ref 10–12.9)
RBC # BLD: 4.69 M/UL — SIGNIFICANT CHANGE UP (ref 4.4–5.2)
RBC # FLD: 14.9 % — SIGNIFICANT CHANGE UP (ref 11–15.6)
SODIUM SERPL-SCNC: 140 MMOL/L — SIGNIFICANT CHANGE UP (ref 135–145)
WBC # BLD: 5.4 K/UL — SIGNIFICANT CHANGE UP (ref 4.8–10.8)
WBC # FLD AUTO: 5.4 K/UL — SIGNIFICANT CHANGE UP (ref 4.8–10.8)

## 2019-01-04 PROCEDURE — 93005 ELECTROCARDIOGRAM TRACING: CPT

## 2019-01-04 PROCEDURE — 93325 DOPPLER ECHO COLOR FLOW MAPG: CPT

## 2019-01-04 PROCEDURE — 93010 ELECTROCARDIOGRAM REPORT: CPT

## 2019-01-04 PROCEDURE — 93320 DOPPLER ECHO COMPLETE: CPT

## 2019-01-04 PROCEDURE — 85730 THROMBOPLASTIN TIME PARTIAL: CPT

## 2019-01-04 PROCEDURE — 93312 ECHO TRANSESOPHAGEAL: CPT

## 2019-01-04 PROCEDURE — 80048 BASIC METABOLIC PNL TOTAL CA: CPT

## 2019-01-04 PROCEDURE — 85027 COMPLETE CBC AUTOMATED: CPT

## 2019-01-04 PROCEDURE — 36415 COLL VENOUS BLD VENIPUNCTURE: CPT

## 2019-01-04 PROCEDURE — 85610 PROTHROMBIN TIME: CPT

## 2019-01-04 RX ORDER — POTASSIUM CHLORIDE 20 MEQ
10 PACKET (EA) ORAL ONCE
Qty: 0 | Refills: 0 | Status: COMPLETED | OUTPATIENT
Start: 2019-01-04 | End: 2019-01-04

## 2019-01-04 RX ADMIN — Medication 100 MILLIEQUIVALENT(S): at 09:41

## 2019-01-04 NOTE — DISCHARGE NOTE ADULT - CARE PROVIDER_API CALL
Leia Alexis), Cardiovascular Disease; Internal Medicine  [address] [place] [id number]  Phone: [phone]  Fax: [phone]

## 2019-01-04 NOTE — DISCHARGE NOTE ADULT - MEDICATION SUMMARY - MEDICATIONS TO TAKE
I will START or STAY ON the medications listed below when I get home from the hospital:    ibuprofen 400 mg oral tablet  -- 1 tab(s) by mouth every 6 hours, As needed, pain  -- Indication: For chronic pain    Cardizem  mg/24 hours oral capsule, extended release  -- 1 cap(s) by mouth once a day  -- Indication: For heartrate AF    hydroCHLOROthiazide 25 mg oral tablet  -- 1 tab(s) by mouth once a day  -- Indication: For diuretic I will START or STAY ON the medications listed below when I get home from the hospital:    Cardizem  mg/24 hours oral capsule, extended release  -- 1 cap(s) by mouth once a day  -- Indication: For heartrate AF    Eliquis 2.5 mg oral tablet  -- 1 tab(s) by mouth 2 times a day  -- Indication: For blood thinner    Metoprolol Succinate ER 25 mg oral tablet, extended release  -- 1 tab(s) by mouth once a day  -- Indication: For heartrate    furosemide 40 mg oral tablet  -- 1 tab(s) by mouth once a day  -- Indication: For diuretic

## 2019-01-04 NOTE — PROGRESS NOTE ADULT - SUBJECTIVE AND OBJECTIVE BOX
[facility] - [facility]                                   [address] [place] [address]                                                      PHONE: [phone]                                                         FAX: [phone]  [email or web address]  ---------------------------------------------------------------------------------------------------------------------------------    Overnight events/patient complaints: here for DANIA and possible DCCV      No Known Allergies    MEDICATIONS  (STANDING):    MEDICATIONS  (PRN):      Vital Signs Last 24 Hrs  T(C): 36.2 (04 Jan 2019 08:56), Max: 36.2 (04 Jan 2019 08:56)  T(F): 97.2 (04 Jan 2019 08:56), Max: 97.2 (04 Jan 2019 08:56)  HR: 129 (04 Jan 2019 08:56) (129 - 129)  BP: 139/92 (04 Jan 2019 08:56) (139/92 - 139/92)  BP(mean): --  RR: 20 (04 Jan 2019 08:56) (20 - 20)  SpO2: 93% (04 Jan 2019 08:56) (93% - 93%)  Daily     Daily   ICU Vital Signs Last 24 Hrs  SHERMAN BLACKWELL  I&O's Detail    I&O's Summary    Drug Dosing Weight  SHERMAN ROSALVA      PHYSICAL EXAM:  General: Appears well developed, well nourished alert and cooperative.  HEENT: Head; normocephalic, atraumatic.  Eyes: Pupils reactive, cornea wnl.  Neck: Supple, no nodes adenopathy, no NVD or carotid bruit or thyromegaly.  CARDIOVASCULAR: Normal S1 and S2, 3/6 systolic murmur at base  LUNGS: No rales, rhonchi or wheeze. Normal breath sounds bilaterally.  ABDOMEN: Soft, nontender without mass or organomegaly. bowel sounds normoactive.  EXTREMITIES: No clubbing, cyanosis or edema. Distal pulses wnl.   SKIN: warm and dry with normal turgor.  NEURO: Alert/oriented x 3/normal motor exam. No pathologic reflexes.    PSYCH: normal affect.        LABS:                        13.7   5.4   )-----------( 247      ( 04 Jan 2019 08:47 )             42.5     01-04    140  |  98  |  20.0  ----------------------------<  91  3.3<L>   |  30.0<H>  |  0.57    Ca    8.8      04 Jan 2019 08:47      SHERMAN BLACKWELL      PT/INR - ( 04 Jan 2019 08:47 )   PT: 16.3 sec;   INR: 1.40 ratio         PTT - ( 04 Jan 2019 08:47 )  PTT:31.6 sec      DANIA perforemd with anesthesia standby, pt tolerated well  results:  Mod conc LVH with normal EF 60%  Mod MR  Severe AS low gradient VETO 0.6 - 0.7 cm2 with mild AI.  DOI 0.27  Mod enlarged LA RA RV with severe TR  no evidence of SEC nor thrombus  DCCV attempted X2 - no successful conversion, pt remained in AF

## 2019-01-04 NOTE — DISCHARGE NOTE ADULT - PLAN OF CARE
optimal cardiac function FU Dr. Alexis outpt to schedule PCI of RCA DELVIS Alexis outpt to schedule PCI of RCA with Dr Valiente

## 2019-01-04 NOTE — PROGRESS NOTE ADULT - ASSESSMENT
Assessment  Severe AS normal EF  Single vessel RCA disease  Mod MR  Af with failed DCCV  Mod to severe pul htn at cath PAP 58 mm hg      Rec  Rate control AF  remain on eliquis  will refer for RCA PCI then TAVR eval -( pt frail for open AVR)

## 2019-01-04 NOTE — DISCHARGE NOTE ADULT - PATIENT PORTAL LINK FT
You can access the [facility], offered by [facility], by registering with the following website: [email or web address]

## 2019-01-04 NOTE — DISCHARGE NOTE ADULT - HOSPITAL COURSE
s/p DANIA no thrombus  Severe AS and moderate mitral regurgitation. Nl LV  Unsuccessful CV    Assessment per Dr. Alexis  Severe AS normal EF  Single vessel RCA disease  Mod MR  Af with failed DCCV  Mod to severe pul htn at cath PAP 58 mm hg  Rec  Rate control AF  remain on eliquis  will refer for RCA PCI then TAVR eval -( pt frail for open AVR)  NEURO: A&O x3 NAD   PULM: Diminished BS marielle +gag +swallow Patent airway  HEART: Irr/irr w/murmer AF  bpm s/p DANIA no thrombus  Severe AS and moderate mitral regurgitation. Nl LV  Unsuccessful CV    Assessment per Dr. Alexis  Severe AS normal EF  Single vessel RCA disease  Mod MR  Af with failed DCCV  Mod to severe pul htn at cath PAP 58 mm hg  Rec  Rate control AF  remain on eliquis  will refer for RCA PCI then TAVR eval -( pt frail for open AVR)  NEURO: A&O x3 NAD Ate well. OOB  PULM: Diminished BS marielle +gag +swallow Patent airway  HEART: Irr/irr w/murmer AF  bpm s/p DANIA no thrombus  Severe AS and moderate mitral regurgitation. Nl LV  Unsuccessful CV    Assessment per Dr. Alexis  Severe AS normal EF  Single vessel RCA disease  Mod MR  Af with failed DCCV  Mod to severe pul htn at cath PAP 58 mm hg  Rec  Rate control AF  remain on eliquis  will refer for RCA PCI then TAVR eval -( pt frail for open AVR)  NEURO: A&O x3 NAD Ate well. OOB  Ate well. Residual moist non productive cough noted.  Alcides eyelids sl reddened (recent cataract surgery)  PULM: Diminished BS alcides +gag +swallow Patent airway  HEART: Irr/irr w/murmer AF  bpm

## 2019-01-04 NOTE — ASU PATIENT PROFILE, ADULT - PMH
Aortic stenosis    Hypertension    Kyphoscoliosis    Mitral valve disease    SCC (squamous cell carcinoma)    Varicose vein of leg

## 2019-01-04 NOTE — DISCHARGE NOTE ADULT - CARE PLAN
Principal Discharge DX:	Atrial fibrillation  Goal:	optimal cardiac function  Assessment and plan of treatment:	FU Dr. Alexis outpt to schedule PCI of RCA Principal Discharge DX:	Atrial fibrillation  Goal:	optimal cardiac function  Assessment and plan of treatment:	FU Dr. Alexis outpt to schedule PCI of RCA with Dr Valiente

## 2019-01-07 PROBLEM — I25.10 ATHEROSCLEROTIC HEART DISEASE OF NATIVE CORONARY ARTERY WITHOUT ANGINA PECTORIS: Chronic | Status: ACTIVE | Noted: 2019-01-04

## 2019-01-07 PROBLEM — M41.9 SCOLIOSIS, UNSPECIFIED: Chronic | Status: ACTIVE | Noted: 2018-12-31

## 2019-01-07 PROBLEM — I48.91 UNSPECIFIED ATRIAL FIBRILLATION: Chronic | Status: ACTIVE | Noted: 2019-01-04

## 2019-01-10 ENCOUNTER — INPATIENT (INPATIENT)
Facility: HOSPITAL | Age: 84
LOS: 0 days | Discharge: HOME | DRG: 247 | End: 2019-01-11
Attending: INTERNAL MEDICINE | Admitting: INTERNAL MEDICINE
Payer: COMMERCIAL

## 2019-01-10 ENCOUNTER — TRANSCRIPTION ENCOUNTER (OUTPATIENT)
Age: 84
End: 2019-01-10

## 2019-01-10 VITALS
TEMPERATURE: 98 F | DIASTOLIC BLOOD PRESSURE: 81 MMHG | OXYGEN SATURATION: 95 % | RESPIRATION RATE: 18 BRPM | HEART RATE: 120 BPM | SYSTOLIC BLOOD PRESSURE: 105 MMHG

## 2019-01-10 DIAGNOSIS — I25.10 ATHEROSCLEROTIC HEART DISEASE OF NATIVE CORONARY ARTERY WITHOUT ANGINA PECTORIS: ICD-10-CM

## 2019-01-10 DIAGNOSIS — Z98.890 OTHER SPECIFIED POSTPROCEDURAL STATES: Chronic | ICD-10-CM

## 2019-01-10 DIAGNOSIS — H26.40 UNSPECIFIED SECONDARY CATARACT: Chronic | ICD-10-CM

## 2019-01-10 LAB
ANION GAP SERPL CALC-SCNC: 12 MMOL/L — SIGNIFICANT CHANGE UP (ref 5–17)
BLD GP AB SCN SERPL QL: SIGNIFICANT CHANGE UP
BUN SERPL-MCNC: 31 MG/DL — HIGH (ref 8–20)
CALCIUM SERPL-MCNC: 8.7 MG/DL — SIGNIFICANT CHANGE UP (ref 8.6–10.2)
CHLORIDE SERPL-SCNC: 98 MMOL/L — SIGNIFICANT CHANGE UP (ref 98–107)
CO2 SERPL-SCNC: 29 MMOL/L — SIGNIFICANT CHANGE UP (ref 22–29)
CREAT SERPL-MCNC: 0.58 MG/DL — SIGNIFICANT CHANGE UP (ref 0.5–1.3)
GLUCOSE SERPL-MCNC: 89 MG/DL — SIGNIFICANT CHANGE UP (ref 70–115)
POTASSIUM SERPL-MCNC: 3.9 MMOL/L — SIGNIFICANT CHANGE UP (ref 3.5–5.3)
POTASSIUM SERPL-SCNC: 3.9 MMOL/L — SIGNIFICANT CHANGE UP (ref 3.5–5.3)
SODIUM SERPL-SCNC: 139 MMOL/L — SIGNIFICANT CHANGE UP (ref 135–145)
TYPE + AB SCN PNL BLD: SIGNIFICANT CHANGE UP

## 2019-01-10 PROCEDURE — 93010 ELECTROCARDIOGRAM REPORT: CPT

## 2019-01-10 RX ORDER — METOPROLOL TARTRATE 50 MG
25 TABLET ORAL DAILY
Qty: 0 | Refills: 0 | Status: DISCONTINUED | OUTPATIENT
Start: 2019-01-10 | End: 2019-01-11

## 2019-01-10 RX ORDER — FUROSEMIDE 40 MG
40 TABLET ORAL DAILY
Qty: 0 | Refills: 0 | Status: DISCONTINUED | OUTPATIENT
Start: 2019-01-10 | End: 2019-01-11

## 2019-01-10 RX ORDER — ASPIRIN/CALCIUM CARB/MAGNESIUM 324 MG
81 TABLET ORAL DAILY
Qty: 0 | Refills: 0 | Status: DISCONTINUED | OUTPATIENT
Start: 2019-01-10 | End: 2019-01-11

## 2019-01-10 RX ORDER — DILTIAZEM HCL 120 MG
240 CAPSULE, EXT RELEASE 24 HR ORAL DAILY
Qty: 0 | Refills: 0 | Status: DISCONTINUED | OUTPATIENT
Start: 2019-01-10 | End: 2019-01-11

## 2019-01-10 RX ORDER — CLOPIDOGREL BISULFATE 75 MG/1
75 TABLET, FILM COATED ORAL DAILY
Qty: 0 | Refills: 0 | Status: DISCONTINUED | OUTPATIENT
Start: 2019-01-11 | End: 2019-01-11

## 2019-01-10 RX ORDER — ACETAMINOPHEN 500 MG
650 TABLET ORAL ONCE
Qty: 0 | Refills: 0 | Status: COMPLETED | OUTPATIENT
Start: 2019-01-10 | End: 2019-01-10

## 2019-01-10 RX ORDER — ATORVASTATIN CALCIUM 80 MG/1
40 TABLET, FILM COATED ORAL AT BEDTIME
Qty: 0 | Refills: 0 | Status: DISCONTINUED | OUTPATIENT
Start: 2019-01-10 | End: 2019-01-10

## 2019-01-10 RX ORDER — APIXABAN 2.5 MG/1
1 TABLET, FILM COATED ORAL
Qty: 0 | Refills: 0 | COMMUNITY

## 2019-01-10 RX ADMIN — Medication 25 MILLIGRAM(S): at 20:49

## 2019-01-10 NOTE — PROGRESS NOTE ADULT - SUBJECTIVE AND OBJECTIVE BOX
Removal of Femoral Sheath    Pulses in the right lower extremity are palpable. The patient was placed in the supine position. The insertion site was identified and the sutures were removed per protocol.  The _#6___ Pakistani femoral sheath was then removed. Direct pressure was applied for  __20____ minutes.     Monitoring of the right groin and both lower extremities including neuro-vascular checks and vital signs every 15 minutes x 4, then every 30 minutes x 2, then every 1 hour was ordered.    Complications: None/Other    Comments:

## 2019-01-10 NOTE — DISCHARGE NOTE ADULT - HOSPITAL COURSE
This is an 84 y/o white female with PMH of Afib (on eliquis), CAD with PCI ELENA to RCA, AS, SCC, HTN, mitral valve disease. Currently feels well; denies chest pain, SOB, palpitations.    Neuro: A&Ox4, neurologically intact, ROM intact  Pulm: CTAB  Cardiac: IRIR, afib on monitor  Vascular: BLLE weeping edema +2; palpable DP pulses; right groin site; soft, nt, pt denies numbness, tingling to extremity    Pt to receive toprol prior to DC.

## 2019-01-10 NOTE — H&P PST ADULT - ASSESSMENT
84 yo female with afib with known CAD for PCI  -Check bmp stat  -Give cardizem 240mg po x 1 now   -Proceed with C as planned

## 2019-01-10 NOTE — DISCHARGE NOTE ADULT - CARE PLAN
Principal Discharge DX:	CAD (coronary artery disease)  Goal:	Optimal cardiac function  Assessment and plan of treatment:	No heavy lifting, driving, sex, tub baths, swimming, or any activity that submerges the lower half of the body in water for 48 hours.  Limited walking and stairs for 48 hours.     Observe the site frequently.  If bleeding or a large lump (the size of a golf ball or bigger) occurs lie flat, apply continuous direct pressure just above the puncture site for at least 10 minutes, and notify your physician immediately.  If the bleeding cannot be controlled, call 911 immediately for assistance.  Notify your physician of pain, swelling or any drainage.    Notify your physician immediately if coldness, numbness, discoloration or pain in your foot occurs. Principal Discharge DX:	CAD (coronary artery disease)  Goal:	Optimal cardiac function  Assessment and plan of treatment:	No heavy lifting, driving, sex, tub baths, swimming, or any activity that submerges the lower half of the body in water for 48 hours.  Limited walking and stairs for 48 hours.     Observe the site frequently.  If bleeding or a large lump (the size of a golf ball or bigger) occurs lie flat, apply continuous direct pressure just above the puncture site for at least 10 minutes, and notify your physician immediately.  If the bleeding cannot be controlled, call 911 immediately for assistance.  Notify your physician of pain, swelling or any drainage.    Notify your physician immediately if coldness, numbness, discoloration or pain in your foot occurs.    -follow up with Dr. Valiente out patient  -continue all of your home medications  -cardiac healthy diet; low salt  -groin site precautions Principal Discharge DX:	CAD (coronary artery disease)  Goal:	Optimal cardiac function  Assessment and plan of treatment:	No heavy lifting, driving, sex, tub baths, swimming, or any activity that submerges the lower half of the body in water for 48 hours.  Limited walking and stairs for 48 hours.     Observe the site frequently.  If bleeding or a large lump (the size of a golf ball or bigger) occurs lie flat, apply continuous direct pressure just above the puncture site for at least 10 minutes, and notify your physician immediately.  If the bleeding cannot be controlled, call 911 immediately for assistance.  Notify your physician of pain, swelling or any drainage.    Notify your physician immediately if coldness, numbness, discoloration or pain in your foot occurs.    -follow up with Dr. Valiente out patient  -continue all of your home medications  -cardiac healthy diet; low salt  -groin site precautions  -resume eliquis in 48 hours on 1/14/19 Principal Discharge DX:	CAD (coronary artery disease)  Goal:	Optimal cardiac function  Assessment and plan of treatment:	No heavy lifting, driving, sex, tub baths, swimming, or any activity that submerges the lower half of the body in water for 48 hours.  Limited walking and stairs for 48 hours.     Observe the site frequently.  If bleeding or a large lump (the size of a golf ball or bigger) occurs lie flat, apply continuous direct pressure just above the puncture site for at least 10 minutes, and notify your physician immediately.  If the bleeding cannot be controlled, call 911 immediately for assistance.  Notify your physician of pain, swelling or any drainage.    Notify your physician immediately if coldness, numbness, discoloration or pain in your foot occurs.    -follow up with Dr. Valiente out patient  -continue all of your home medications  -cardiac healthy diet; low salt  -groin site precautions  -resume eliquis in 48 hours on SUNDAY 1/13/19

## 2019-01-10 NOTE — PROGRESS NOTE ADULT - SUBJECTIVE AND OBJECTIVE BOX
Subjective:  83y  Female s/p PCI ELENA to RCA, RFA with #6fr sheath sutured in place. Patient awake and alert without complaints overnight. Denies chest pain, sob, palps.    PAST MEDICAL & SURGICAL HISTORY:  Atrial fibrillation  CAD (coronary artery disease)  Kyphoscoliosis  Aortic stenosis  SCC (squamous cell carcinoma)  Varicose vein of leg  Mitral valve disease  Hypertension  After-cataract of both eyes  H/O colonoscopy  H/O varicose vein stripping    FAMILY HISTORY:  No pertinent family history in first degree relatives    MEDICATIONS  (STANDING):  aspirin enteric coated 81 milliGRAM(s) Oral daily  atorvastatin 40 milliGRAM(s) Oral at bedtime  diltiazem    milliGRAM(s) Oral daily  furosemide    Tablet 40 milliGRAM(s) Oral daily  metoprolol succinate ER 25 milliGRAM(s) Oral daily          General: No fatigue, no fevers/chills  Respiratory: No dyspnea, no cough, no wheeze  CV: No chest pain, no palpitations  Abd: No nausea  Neuro: No headache, no dizziness  No Known Allergies      Objective:  Vital Signs Last 24 Hrs  T(C): 36.4 (10 Mychal 2019 12:50), Max: 36.4 (10 Mychal 2019 12:50)  T(F): 97.5 (10 Mychal 2019 12:50), Max: 97.5 (10 Mychal 2019 12:50)  HR: 109 (10 Mychal 2019 17:30) (105 - 120)  BP: 122/86 (10 Mychal 2019 17:15) (105/81 - 127/80)  BP(mean): --  RR: 18 (10 Mychal 2019 17:30) (18 - 18)  SpO2: 97% (10 Mychal 2019 17:30) (94% - 97%)  CM: SR  Neuro: A&OX3, CN 2-12 intact  HEENT: NC, AT  Lungs: CTA B/L  CV: S1, S2, no murmur, RRR  Abd: Soft  Right Groin: Soft, no bleeding, no hematoma  Extremity: + distal pulses  EK-10    139  |  98  |  31.0<H>  ----------------------------<  89  3.9   |  29.0  |  0.58    Ca    8.7      10 Mychal 2019 14:09          A/P: CAD s/p PCI: ELENA to RCA  1.                 Groin management discussed with patient.  2.                 Post procedure EKG reviewed and stable.    3.                 Pt given instructions on importance of taking antiplatelet medication.    4.                 Aspirin 81mg/Plavix/Statin/BB  5.                 Follow up with cardiologist in 2 weeks or sooner if needed  6.                 Bedrest x 6  hours  7.                 Remove sheath/radial band in 2 hours if ACT <180  8.                 Probable discharge in am  9.                 Resume eliquis in 48 hours Subjective:  83y  Female s/p PCI ELENA to RCA, RFA with #6fr sheath sutured in place. Patient awake and alert without complaints overnight. Denies chest pain, sob, palps.    PAST MEDICAL & SURGICAL HISTORY:  Atrial fibrillation  CAD (coronary artery disease)  Kyphoscoliosis  Aortic stenosis  SCC (squamous cell carcinoma)  Varicose vein of leg  Mitral valve disease  Hypertension  After-cataract of both eyes  H/O colonoscopy  H/O varicose vein stripping    FAMILY HISTORY:  No pertinent family history in first degree relatives    MEDICATIONS  (STANDING):  aspirin enteric coated 81 milliGRAM(s) Oral daily  atorvastatin 40 milliGRAM(s) Oral at bedtime  diltiazem    milliGRAM(s) Oral daily  furosemide    Tablet 40 milliGRAM(s) Oral daily  metoprolol succinate ER 25 milliGRAM(s) Oral daily          General: No fatigue, no fevers/chills  Respiratory: No dyspnea, no cough, no wheeze  CV: No chest pain, no palpitations  Abd: No nausea  Neuro: No headache, no dizziness  No Known Allergies      Objective:  Vital Signs Last 24 Hrs  T(C): 36.4 (10 Mychal 2019 12:50), Max: 36.4 (10 Mychal 2019 12:50)  T(F): 97.5 (10 Mychal 2019 12:50), Max: 97.5 (10 Mychal 2019 12:50)  HR: 109 (10 Mychal 2019 17:30) (105 - 120)  BP: 122/86 (10 Mychal 2019 17:15) (105/81 - 127/80)  BP(mean): --  RR: 18 (10 Mychal 2019 17:30) (18 - 18)  SpO2: 97% (10 Mychal 2019 17:30) (94% - 97%)  CM: SR  Neuro: A&OX3, CN 2-12 intact  HEENT: NC, AT  Lungs: CTA B/L  CV: S1, S2, no murmur, RRR  Abd: Soft  Right Groin: Soft, no bleeding, no hematoma  Extremity: + distal pulses  EK-10    139  |  98  |  31.0<H>  ----------------------------<  89  3.9   |  29.0  |  0.58    Ca    8.7      10 Mychal 2019 14:09          A/P: CAD s/p PCI: ELENA to RCA  1.                 Groin management discussed with patient.  2.                 Post procedure EKG reviewed and stable.    3.                 Pt given instructions on importance of taking antiplatelet medication.    4.                 Aspirin 81mg/Plavix/BB. No statin as per Dr. Valiente  5.                 Follow up with cardiologist in 2 weeks or sooner if needed  6.                 Bedrest x 6  hours  7.                 Remove sheath/radial band in 2 hours if ACT <180  8.                 Probable discharge in am  9.                 Resume eliquis in 48 hours Subjective:  83y  Female s/p PCI ELENA to RCA, RFA with #6fr sheath sutured in place. Patient awake and alert without complaints overnight. Denies chest pain, sob, palps.    PAST MEDICAL & SURGICAL HISTORY:  Atrial fibrillation  CAD (coronary artery disease)  Kyphoscoliosis  Aortic stenosis  SCC (squamous cell carcinoma)  Varicose vein of leg  Mitral valve disease  Hypertension  After-cataract of both eyes  H/O colonoscopy  H/O varicose vein stripping    FAMILY HISTORY:  No pertinent family history in first degree relatives    MEDICATIONS  (STANDING):  aspirin enteric coated 81 milliGRAM(s) Oral daily  atorvastatin 40 milliGRAM(s) Oral at bedtime  diltiazem    milliGRAM(s) Oral daily  furosemide    Tablet 40 milliGRAM(s) Oral daily  metoprolol succinate ER 25 milliGRAM(s) Oral daily          General: No fatigue, no fevers/chills  Respiratory: No dyspnea, no cough, no wheeze  CV: No chest pain, no palpitations  Abd: No nausea  Neuro: No headache, no dizziness  No Known Allergies      Objective:  Vital Signs Last 24 Hrs  T(C): 36.4 (10 Mychal 2019 12:50), Max: 36.4 (10 Mychal 2019 12:50)  T(F): 97.5 (10 Mychal 2019 12:50), Max: 97.5 (10 Mychal 2019 12:50)  HR: 109 (10 Mychal 2019 17:30) (105 - 120)  BP: 122/86 (10 Mychal 2019 17:15) (105/81 - 127/80)  BP(mean): --  RR: 18 (10 Mychal 2019 17:30) (18 - 18)  SpO2: 97% (10 Mychal 2019 17:30) (94% - 97%)  CM: SR  Neuro: A&OX3, CN 2-12 intact  HEENT: NC, AT  Lungs: CTA B/L  CV: S1, S2, no murmur, RRR  Abd: Soft  Right Groin: Soft, no bleeding, no hematoma  Extremity: + distal pulses  EKG: Afib 111 bpm    01-10    139  |  98  |  31.0<H>  ----------------------------<  89  3.9   |  29.0  |  0.58    Ca    8.7      10 Mychal 2019 14:09          A/P: CAD s/p PCI: ELENA to RCA  1.                 Groin management discussed with patient.  2.                 Post procedure EKG reviewed and stable.    3.                 Pt given instructions on importance of taking antiplatelet medication.    4.                 Aspirin 81mg/Plavix/BB. No statin as per Dr. Valiente  5.                 Follow up with cardiologist in 2 weeks or sooner if needed  6.                 Bedrest x 6  hours  7.                 Remove sheath/radial band in 2 hours if ACT <180  8.                 Probable discharge in am  9.                 Resume eliquis in 48 hours

## 2019-01-10 NOTE — DISCHARGE NOTE ADULT - PATIENT PORTAL LINK FT
You can access the "[employer] Patient Portal, offered by [facility], by registering with the following website: [email or web address]

## 2019-01-10 NOTE — DISCHARGE NOTE ADULT - MEDICATION SUMMARY - MEDICATIONS TO TAKE
I will START or STAY ON the medications listed below when I get home from the hospital:    aspirin 81 mg oral delayed release tablet  -- 1 tab(s) by mouth once a day  -- Indication: For blood flow/heart    Cardizem  mg/24 hours oral capsule, extended release  -- 1 cap(s) by mouth once a day  -- Indication: For heart rate/afib    Eliquis 2.5 mg oral tablet  -- 1 tab(s) by mouth 2 times a day  -- Indication: For blood thinner/afib    clopidogrel 75 mg oral tablet  -- 1 tab(s) by mouth once a day  -- Indication: For blood flow/heart    metoprolol succinate 25 mg oral tablet, extended release  -- 1 tab(s) by mouth once a day  -- Indication: For heart rate/afib    furosemide 40 mg oral tablet  -- 1 tab(s) by mouth once a day  -- Indication: For water pill/diuretic

## 2019-01-10 NOTE — DISCHARGE NOTE ADULT - NS AS ACTIVITY OBS
Walking-Indoors allowed/Do not drive or operate machinery/Showering allowed/Walking-Outdoors allowed/No Heavy lifting/straining

## 2019-01-10 NOTE — H&P PST ADULT - PMH
Aortic stenosis    Atrial fibrillation    CAD (coronary artery disease)    Hypertension    Kyphoscoliosis    Mitral valve disease    SCC (squamous cell carcinoma)    Varicose vein of leg

## 2019-01-10 NOTE — DISCHARGE NOTE ADULT - PLAN OF CARE
Optimal cardiac function No heavy lifting, driving, sex, tub baths, swimming, or any activity that submerges the lower half of the body in water for 48 hours.  Limited walking and stairs for 48 hours.     Observe the site frequently.  If bleeding or a large lump (the size of a golf ball or bigger) occurs lie flat, apply continuous direct pressure just above the puncture site for at least 10 minutes, and notify your physician immediately.  If the bleeding cannot be controlled, call 911 immediately for assistance.  Notify your physician of pain, swelling or any drainage.    Notify your physician immediately if coldness, numbness, discoloration or pain in your foot occurs. No heavy lifting, driving, sex, tub baths, swimming, or any activity that submerges the lower half of the body in water for 48 hours.  Limited walking and stairs for 48 hours.     Observe the site frequently.  If bleeding or a large lump (the size of a golf ball or bigger) occurs lie flat, apply continuous direct pressure just above the puncture site for at least 10 minutes, and notify your physician immediately.  If the bleeding cannot be controlled, call 911 immediately for assistance.  Notify your physician of pain, swelling or any drainage.    Notify your physician immediately if coldness, numbness, discoloration or pain in your foot occurs.    -follow up with Dr. Valiente out patient  -continue all of your home medications  -cardiac healthy diet; low salt  -groin site precautions No heavy lifting, driving, sex, tub baths, swimming, or any activity that submerges the lower half of the body in water for 48 hours.  Limited walking and stairs for 48 hours.     Observe the site frequently.  If bleeding or a large lump (the size of a golf ball or bigger) occurs lie flat, apply continuous direct pressure just above the puncture site for at least 10 minutes, and notify your physician immediately.  If the bleeding cannot be controlled, call 911 immediately for assistance.  Notify your physician of pain, swelling or any drainage.    Notify your physician immediately if coldness, numbness, discoloration or pain in your foot occurs.    -follow up with Dr. Valiente out patient  -continue all of your home medications  -cardiac healthy diet; low salt  -groin site precautions  -resume eliquis in 48 hours on 1/14/19 No heavy lifting, driving, sex, tub baths, swimming, or any activity that submerges the lower half of the body in water for 48 hours.  Limited walking and stairs for 48 hours.     Observe the site frequently.  If bleeding or a large lump (the size of a golf ball or bigger) occurs lie flat, apply continuous direct pressure just above the puncture site for at least 10 minutes, and notify your physician immediately.  If the bleeding cannot be controlled, call 911 immediately for assistance.  Notify your physician of pain, swelling or any drainage.    Notify your physician immediately if coldness, numbness, discoloration or pain in your foot occurs.    -follow up with Dr. Valiente out patient  -continue all of your home medications  -cardiac healthy diet; low salt  -groin site precautions  -resume eliquis in 48 hours on SUNDAY 1/13/19

## 2019-01-10 NOTE — DISCHARGE NOTE ADULT - CARE PROVIDER_API CALL
Ravi Valiente (MD), Cardiovascular Disease; Interventional Cardiology  [address] [place] [address]  Phone: [phone]  Fax: [phone]

## 2019-01-11 VITALS — OXYGEN SATURATION: 97 % | HEART RATE: 90 BPM | RESPIRATION RATE: 16 BRPM

## 2019-01-11 LAB
ANION GAP SERPL CALC-SCNC: 10 MMOL/L — SIGNIFICANT CHANGE UP (ref 5–17)
BASOPHILS # BLD AUTO: 0 K/UL — SIGNIFICANT CHANGE UP (ref 0–0.2)
BASOPHILS NFR BLD AUTO: 0.9 % — SIGNIFICANT CHANGE UP (ref 0–2)
BUN SERPL-MCNC: 23 MG/DL — HIGH (ref 8–20)
CALCIUM SERPL-MCNC: 8.4 MG/DL — LOW (ref 8.6–10.2)
CHLORIDE SERPL-SCNC: 102 MMOL/L — SIGNIFICANT CHANGE UP (ref 98–107)
CO2 SERPL-SCNC: 24 MMOL/L — SIGNIFICANT CHANGE UP (ref 22–29)
CREAT SERPL-MCNC: 0.43 MG/DL — LOW (ref 0.5–1.3)
EOSINOPHIL # BLD AUTO: 0.1 K/UL — SIGNIFICANT CHANGE UP (ref 0–0.5)
EOSINOPHIL NFR BLD AUTO: 2.1 % — SIGNIFICANT CHANGE UP (ref 0–6)
GLUCOSE SERPL-MCNC: 94 MG/DL — SIGNIFICANT CHANGE UP (ref 70–115)
HCT VFR BLD CALC: 40.2 % — SIGNIFICANT CHANGE UP (ref 37–47)
HGB BLD-MCNC: 13.1 G/DL — SIGNIFICANT CHANGE UP (ref 12–16)
LYMPHOCYTES # BLD AUTO: 1.4 K/UL — SIGNIFICANT CHANGE UP (ref 1–4.8)
LYMPHOCYTES # BLD AUTO: 24.2 % — SIGNIFICANT CHANGE UP (ref 20–55)
MCHC RBC-ENTMCNC: 29.5 PG — SIGNIFICANT CHANGE UP (ref 27–31)
MCHC RBC-ENTMCNC: 32.6 G/DL — SIGNIFICANT CHANGE UP (ref 32–36)
MCV RBC AUTO: 90.5 FL — SIGNIFICANT CHANGE UP (ref 81–99)
MONOCYTES # BLD AUTO: 0.9 K/UL — HIGH (ref 0–0.8)
MONOCYTES NFR BLD AUTO: 15 % — HIGH (ref 3–10)
NEUTROPHILS # BLD AUTO: 3.3 K/UL — SIGNIFICANT CHANGE UP (ref 1.8–8)
NEUTROPHILS NFR BLD AUTO: 57.6 % — SIGNIFICANT CHANGE UP (ref 37–73)
PLATELET # BLD AUTO: 217 K/UL — SIGNIFICANT CHANGE UP (ref 150–400)
POTASSIUM SERPL-MCNC: 3.7 MMOL/L — SIGNIFICANT CHANGE UP (ref 3.5–5.3)
POTASSIUM SERPL-SCNC: 3.7 MMOL/L — SIGNIFICANT CHANGE UP (ref 3.5–5.3)
RBC # BLD: 4.44 M/UL — SIGNIFICANT CHANGE UP (ref 4.4–5.2)
RBC # FLD: 14.8 % — SIGNIFICANT CHANGE UP (ref 11–15.6)
SODIUM SERPL-SCNC: 136 MMOL/L — SIGNIFICANT CHANGE UP (ref 135–145)
WBC # BLD: 5.7 K/UL — SIGNIFICANT CHANGE UP (ref 4.8–10.8)
WBC # FLD AUTO: 5.7 K/UL — SIGNIFICANT CHANGE UP (ref 4.8–10.8)

## 2019-01-11 PROCEDURE — C9600: CPT

## 2019-01-11 PROCEDURE — 80048 BASIC METABOLIC PNL TOTAL CA: CPT

## 2019-01-11 PROCEDURE — C1725: CPT

## 2019-01-11 PROCEDURE — 36415 COLL VENOUS BLD VENIPUNCTURE: CPT

## 2019-01-11 PROCEDURE — C1769: CPT

## 2019-01-11 PROCEDURE — 93010 ELECTROCARDIOGRAM REPORT: CPT

## 2019-01-11 PROCEDURE — C1887: CPT

## 2019-01-11 PROCEDURE — C1894: CPT

## 2019-01-11 PROCEDURE — 86901 BLOOD TYPING SEROLOGIC RH(D): CPT

## 2019-01-11 PROCEDURE — 93005 ELECTROCARDIOGRAM TRACING: CPT

## 2019-01-11 PROCEDURE — 86850 RBC ANTIBODY SCREEN: CPT

## 2019-01-11 PROCEDURE — 86900 BLOOD TYPING SEROLOGIC ABO: CPT

## 2019-01-11 PROCEDURE — 85027 COMPLETE CBC AUTOMATED: CPT

## 2019-01-11 PROCEDURE — C1874: CPT

## 2019-01-11 RX ORDER — ASPIRIN/CALCIUM CARB/MAGNESIUM 324 MG
1 TABLET ORAL
Qty: 90 | Refills: 3 | OUTPATIENT
Start: 2019-01-11 | End: 2020-01-05

## 2019-01-11 RX ORDER — CLOPIDOGREL BISULFATE 75 MG/1
1 TABLET, FILM COATED ORAL
Qty: 90 | Refills: 0 | OUTPATIENT
Start: 2019-01-11 | End: 2019-04-10

## 2019-01-11 RX ORDER — METOPROLOL TARTRATE 50 MG
1 TABLET ORAL
Qty: 0 | Refills: 0 | COMMUNITY

## 2019-01-11 RX ORDER — METOPROLOL TARTRATE 50 MG
1 TABLET ORAL
Qty: 0 | Refills: 0 | COMMUNITY
Start: 2019-01-11

## 2019-01-11 RX ADMIN — Medication 240 MILLIGRAM(S): at 06:36

## 2019-01-11 RX ADMIN — Medication 25 MILLIGRAM(S): at 08:25

## 2019-01-11 RX ADMIN — Medication 650 MILLIGRAM(S): at 01:00

## 2019-01-11 RX ADMIN — Medication 650 MILLIGRAM(S): at 00:10

## 2019-01-13 RX ORDER — APIXABAN 2.5 MG/1
1 TABLET, FILM COATED ORAL
Qty: 0 | Refills: 0 | COMMUNITY
Start: 2019-01-13

## 2019-01-24 ENCOUNTER — EMERGENCY (EMERGENCY)
Facility: HOSPITAL | Age: 84
LOS: 1 days | Discharge: HOME | End: 2019-01-24
Attending: EMERGENCY MEDICINE
Payer: MEDICARE

## 2019-01-24 VITALS
DIASTOLIC BLOOD PRESSURE: 62 MMHG | OXYGEN SATURATION: 96 % | TEMPERATURE: 100 F | SYSTOLIC BLOOD PRESSURE: 104 MMHG | RESPIRATION RATE: 15 BRPM | HEART RATE: 85 BPM

## 2019-01-24 VITALS
SYSTOLIC BLOOD PRESSURE: 126 MMHG | OXYGEN SATURATION: 99 % | RESPIRATION RATE: 18 BRPM | DIASTOLIC BLOOD PRESSURE: 84 MMHG | HEART RATE: 90 BPM | TEMPERATURE: 99 F

## 2019-01-24 DIAGNOSIS — Z98.890 OTHER SPECIFIED POSTPROCEDURAL STATES: Chronic | ICD-10-CM

## 2019-01-24 DIAGNOSIS — H26.40 UNSPECIFIED SECONDARY CATARACT: Chronic | ICD-10-CM

## 2019-01-24 PROCEDURE — 90471 IMMUNIZATION ADMIN: CPT

## 2019-01-24 PROCEDURE — 73562 X-RAY EXAM OF KNEE 3: CPT

## 2019-01-24 PROCEDURE — 99283 EMERGENCY DEPT VISIT LOW MDM: CPT

## 2019-01-24 PROCEDURE — 73080 X-RAY EXAM OF ELBOW: CPT

## 2019-01-24 PROCEDURE — 99284 EMERGENCY DEPT VISIT MOD MDM: CPT | Mod: 25

## 2019-01-24 PROCEDURE — 73562 X-RAY EXAM OF KNEE 3: CPT | Mod: 26,50

## 2019-01-24 PROCEDURE — 73080 X-RAY EXAM OF ELBOW: CPT | Mod: 26,RT

## 2019-01-24 PROCEDURE — 90715 TDAP VACCINE 7 YRS/> IM: CPT

## 2019-01-24 RX ORDER — TETANUS TOXOID, REDUCED DIPHTHERIA TOXOID AND ACELLULAR PERTUSSIS VACCINE, ADSORBED 5; 2.5; 8; 8; 2.5 [IU]/.5ML; [IU]/.5ML; UG/.5ML; UG/.5ML; UG/.5ML
0.5 SUSPENSION INTRAMUSCULAR ONCE
Qty: 0 | Refills: 0 | Status: COMPLETED | OUTPATIENT
Start: 2019-01-24 | End: 2019-01-24

## 2019-01-24 RX ADMIN — TETANUS TOXOID, REDUCED DIPHTHERIA TOXOID AND ACELLULAR PERTUSSIS VACCINE, ADSORBED 0.5 MILLILITER(S): 5; 2.5; 8; 8; 2.5 SUSPENSION INTRAMUSCULAR at 15:56

## 2019-01-24 NOTE — ED PROVIDER NOTE - PROGRESS NOTE DETAILS
feeling well no bony pain d/c home outpt ortho and pcp f/u, bleeding controlled right elbow with pressure

## 2019-01-24 NOTE — ED ADULT TRIAGE NOTE - CHIEF COMPLAINT QUOTE
Pt BIBA from home for mechanical trip and fall with right arm abrasion, pt on eliquis. Pt denies hitting head or LOC. No change in mental status, pt hemodynamically stable. a&o x4. Son bedside.

## 2019-01-24 NOTE — ED PROVIDER NOTE - PHYSICAL EXAMINATION
skin tear abrasion to the right elbow and knees b/l abrasion to the hands b/l with small skin tears as well

## 2019-01-24 NOTE — ED PROVIDER NOTE - CARE PLAN
Principal Discharge DX:	Skin tear of elbow without complication  Secondary Diagnosis:	Coagulation defect  Secondary Diagnosis:	Contusion

## 2019-01-24 NOTE — ED ADULT NURSE NOTE - NURSING SKIN WOUND TYPE #1
skin tear/right knee, abraision left knee and wound already wrapped to right arm - right arm treated by Dr Schrader

## 2019-01-24 NOTE — ED PROVIDER NOTE - MEDICAL DECISION MAKING DETAILS
fall no head injury no LOC no neck pain elbow and knee pain skin tears denies pain - wound care and outpt f/u

## 2019-01-24 NOTE — ED ADULT NURSE NOTE - NSIMPLEMENTINTERV_GEN_ALL_ED
Implemented All Fall with Harm Risk Interventions:  [place] to call system. Call bell, personal items and telephone within reach. Instruct patient to call for assistance. Room bathroom lighting operational. Non-slip footwear when patient is off stretcher. Physically safe environment: no spills, clutter or unnecessary equipment. Stretcher in lowest position, wheels locked, appropriate side rails in place. Provide visual cue, wrist band, yellow gown, etc. Monitor gait and stability. Monitor for mental status changes and reorient to person, place, and time. Review medications for side effects contributing to fall risk. Reinforce activity limits and safety measures with patient and family. Provide visual clues: red socks.

## 2019-01-24 NOTE — ED PROVIDER NOTE - OBJECTIVE STATEMENT
84 y/o F s/p trip and fall on eliquis denies head injury presents bc bleeding from right elbow wouldn't stop also having some abrasions to the knees b/l     no head injury no LOC no neck or back pain    denies any bony pain even where she has large skin tears

## 2019-01-24 NOTE — ED ADULT NURSE NOTE - OBJECTIVE STATEMENT
83 year old female in no acute distress, alert and oriented x 4, c/o bleeding to right knee s/p mechanical fall in her driveway this morning. Pt was already seen  by Dr Schrader for an arm wound which is wrapped.

## 2019-01-28 ENCOUNTER — APPOINTMENT (OUTPATIENT)
Dept: INTERNAL MEDICINE | Facility: CLINIC | Age: 84
End: 2019-01-28
Payer: MEDICARE

## 2019-01-28 VITALS
BODY MASS INDEX: 21.97 KG/M2 | HEIGHT: 63 IN | HEART RATE: 82 BPM | WEIGHT: 124 LBS | SYSTOLIC BLOOD PRESSURE: 120 MMHG | DIASTOLIC BLOOD PRESSURE: 80 MMHG

## 2019-01-28 DIAGNOSIS — Z95.828 PRESENCE OF OTHER VASCULAR IMPLANTS AND GRAFTS: ICD-10-CM

## 2019-01-28 DIAGNOSIS — Z91.81 HISTORY OF FALLING: ICD-10-CM

## 2019-01-28 PROCEDURE — 99214 OFFICE O/P EST MOD 30 MIN: CPT

## 2019-01-29 NOTE — ADDENDUM
[FreeTextEntry1] : Received x-rays done by the ED on 1/24 showing\par -X-ray of bilateral knees shows no acute findings, old healed left patella fracture and mild osteoarthritis of bilateral knees\par -X-ray of the right elbow show no fracture or dislocation

## 2019-01-29 NOTE — PHYSICAL EXAM
[No Acute Distress] : no acute distress [No Respiratory Distress] : no respiratory distress  [Clear to Auscultation] : lungs were clear to auscultation bilaterally [Normal Rate] : normal rate  [Normal Affect] : the affect was normal [Normal Mood] : the mood was normal [de-identified] : +irreg with CVR /+murmur [de-identified] : 2++ edema with ++weeping b/l LE, negative homans [de-identified] : Right elbow with +skin tear/hematoma with +reactive forearm swelling, no active bleeding/no secondary infection

## 2019-01-29 NOTE — ASSESSMENT
[FreeTextEntry1] : Patient status post fall with right elbow skin tear/hematoma without secondary infection or active bleeding. Fall precautions discussed. Patient continues with chronic edema with known severe aortic stenosis which she has consult scheduled for a possible TAVR and continues on Lasix/spironolactone but will send to vascular for possible wrapping. Patient to followup with cardiology as planned. Patient will report any new issues and return to the office as scheduled for regular up

## 2019-01-31 ENCOUNTER — APPOINTMENT (OUTPATIENT)
Dept: CARDIOTHORACIC SURGERY | Facility: CLINIC | Age: 84
End: 2019-01-31
Payer: MEDICARE

## 2019-01-31 ENCOUNTER — OUTPATIENT (OUTPATIENT)
Dept: OUTPATIENT SERVICES | Facility: HOSPITAL | Age: 84
LOS: 1 days | End: 2019-01-31
Payer: MEDICARE

## 2019-01-31 VITALS
RESPIRATION RATE: 18 BRPM | HEART RATE: 80 BPM | DIASTOLIC BLOOD PRESSURE: 84 MMHG | SYSTOLIC BLOOD PRESSURE: 122 MMHG | HEIGHT: 63 IN | OXYGEN SATURATION: 98 % | WEIGHT: 125 LBS | BODY MASS INDEX: 22.15 KG/M2

## 2019-01-31 DIAGNOSIS — Z98.890 OTHER SPECIFIED POSTPROCEDURAL STATES: Chronic | ICD-10-CM

## 2019-01-31 DIAGNOSIS — H26.40 UNSPECIFIED SECONDARY CATARACT: Chronic | ICD-10-CM

## 2019-01-31 DIAGNOSIS — I35.0 NONRHEUMATIC AORTIC (VALVE) STENOSIS: ICD-10-CM

## 2019-01-31 LAB
BUN SERPL-MCNC: 29 MG/DL — HIGH (ref 8–20)
CREAT SERPL-MCNC: 0.62 MG/DL — SIGNIFICANT CHANGE UP (ref 0.5–1.3)

## 2019-01-31 PROCEDURE — 99205 OFFICE O/P NEW HI 60 MIN: CPT

## 2019-01-31 PROCEDURE — 71275 CT ANGIOGRAPHY CHEST: CPT

## 2019-01-31 PROCEDURE — 84520 ASSAY OF UREA NITROGEN: CPT

## 2019-01-31 PROCEDURE — 74174 CTA ABD&PLVS W/CONTRAST: CPT | Mod: 26

## 2019-01-31 PROCEDURE — 74174 CTA ABD&PLVS W/CONTRAST: CPT

## 2019-01-31 PROCEDURE — 82565 ASSAY OF CREATININE: CPT

## 2019-01-31 PROCEDURE — 36415 COLL VENOUS BLD VENIPUNCTURE: CPT

## 2019-01-31 PROCEDURE — 71275 CT ANGIOGRAPHY CHEST: CPT | Mod: 26

## 2019-01-31 NOTE — CONSULT LETTER
[Dear  ___] : Dear  [unfilled], [Consult Letter:] : I had the pleasure of evaluating your patient, [unfilled]. [Consult Closing:] : Thank you very much for allowing me to participate in the care of this patient.  If you have any questions, please do not hesitate to contact me. [Sincerely,] : Sincerely, [DrKimberlee  ___] : Dr. BOWEN [DrKimberlee ___] : Dr. BOWEN [FreeTextEntry2] : Ravi Valiente MD [FreeTextEntry3] : Tee Hernández MD\par  of Cardiothoracic Surgery\par [facility]\par [address] \[place] [address]\par [phone]\par

## 2019-01-31 NOTE — REVIEW OF SYSTEMS
[Lower Ext Edema] : lower extremity edema [Shortness Of Breath] : shortness of breath [SOB on Exertion] : shortness of breath during exertion [Joint Stiffness] : joint stiffness [Easy Bleeding] : a tendency for easy bleeding [Negative] : Endocrine [FreeTextEntry5] : 3+ weeping edema

## 2019-01-31 NOTE — ASSESSMENT
[FreeTextEntry1] : Ms. Carbone is an 83 year old female with severe symptomatic aortic valve stenosis who is a candidiate for TAVR procedure.  I explained the risks, benefits, and alternatives of surgery to the patient and family. They understand and agree to proceed. I thank you for the opportunity to participate in the care of your patients. Please do not hesitate to contact me should you have any questions.\par

## 2019-01-31 NOTE — HISTORY OF PRESENT ILLNESS
[FreeTextEntry1] : This is an 83 year old female who presents to the office today for TAVR evaluation.  A DANIA from 1/4/19 reveals a heavily calcified aortic valve with severe AS and an EF of 60-65%.  She underwent cardiac cath on 1/10/19 with ELENA placed to the prox RCA. She complains of shortness of breath and  leg edema. NYHA class II congestive heart failure.

## 2019-01-31 NOTE — PHYSICAL EXAM
[General Appearance - Alert] : alert [Sclera] : the sclera and conjunctiva were normal [Outer Ear] : the ears and nose were normal in appearance [Neck Appearance] : the appearance of the neck was normal [] : no respiratory distress [Exaggerated Use Of Accessory Muscles For Inspiration] : no accessory muscle use [Apical Impulse] : the apical impulse was normal [Heart Sounds] : normal S1 and S2 [FreeTextEntry1] : II/VI systolic murmur at the right second intecostal space [Examination Of The Chest] : the chest was normal in appearance [Arterial Pulses Carotid] : carotid pulses were normal with no bruits [Bowel Sounds] : normal bowel sounds [Abdomen Soft] : soft [Cervical Lymph Nodes Enlarged Posterior Bilaterally] : posterior cervical [No CVA Tenderness] : no ~M costovertebral angle tenderness [Abnormal Walk] : normal gait [Nail Clubbing] : no clubbing  or cyanosis of the fingernails [Skin Color & Pigmentation] : normal skin color and pigmentation [Cranial Nerves] : cranial nerves 2-12 were intact [Sensation] : the sensory exam was normal to light touch and pinprick [Oriented To Time, Place, And Person] : oriented to person, place, and time [Affect] : the affect was normal

## 2019-02-09 ENCOUNTER — FORM ENCOUNTER (OUTPATIENT)
Age: 84
End: 2019-02-09

## 2019-02-10 ENCOUNTER — APPOINTMENT (OUTPATIENT)
Dept: MRI IMAGING | Facility: CLINIC | Age: 84
End: 2019-02-10
Payer: MEDICARE

## 2019-02-10 ENCOUNTER — OUTPATIENT (OUTPATIENT)
Dept: OUTPATIENT SERVICES | Facility: HOSPITAL | Age: 84
LOS: 1 days | End: 2019-02-10
Payer: MEDICARE

## 2019-02-10 DIAGNOSIS — Z98.890 OTHER SPECIFIED POSTPROCEDURAL STATES: Chronic | ICD-10-CM

## 2019-02-10 DIAGNOSIS — R16.0 HEPATOMEGALY, NOT ELSEWHERE CLASSIFIED: ICD-10-CM

## 2019-02-10 DIAGNOSIS — H26.40 UNSPECIFIED SECONDARY CATARACT: Chronic | ICD-10-CM

## 2019-02-10 PROCEDURE — 74183 MRI ABD W/O CNTR FLWD CNTR: CPT

## 2019-02-10 PROCEDURE — 74183 MRI ABD W/O CNTR FLWD CNTR: CPT | Mod: 26

## 2019-02-10 PROCEDURE — A9585: CPT

## 2019-02-13 ENCOUNTER — APPOINTMENT (OUTPATIENT)
Dept: VASCULAR SURGERY | Facility: CLINIC | Age: 84
End: 2019-02-13
Payer: MEDICARE

## 2019-02-13 VITALS — DIASTOLIC BLOOD PRESSURE: 62 MMHG | SYSTOLIC BLOOD PRESSURE: 97 MMHG

## 2019-02-13 VITALS
HEART RATE: 99 BPM | DIASTOLIC BLOOD PRESSURE: 57 MMHG | OXYGEN SATURATION: 100 % | WEIGHT: 115 LBS | TEMPERATURE: 97.8 F | RESPIRATION RATE: 14 BRPM | BODY MASS INDEX: 20.38 KG/M2 | HEIGHT: 63 IN | SYSTOLIC BLOOD PRESSURE: 94 MMHG

## 2019-02-13 DIAGNOSIS — I87.2 VENOUS INSUFFICIENCY (CHRONIC) (PERIPHERAL): ICD-10-CM

## 2019-02-13 DIAGNOSIS — R60.0 LOCALIZED EDEMA: ICD-10-CM

## 2019-02-13 DIAGNOSIS — I86.8 VARICOSE VEINS OF OTHER SPECIFIED SITES: ICD-10-CM

## 2019-02-13 PROCEDURE — 99203 OFFICE O/P NEW LOW 30 MIN: CPT

## 2019-02-13 PROCEDURE — 93970 EXTREMITY STUDY: CPT

## 2019-02-22 ENCOUNTER — APPOINTMENT (OUTPATIENT)
Dept: INTERNAL MEDICINE | Facility: CLINIC | Age: 84
End: 2019-02-22
Payer: MEDICARE

## 2019-02-22 VITALS
HEIGHT: 63 IN | HEART RATE: 88 BPM | OXYGEN SATURATION: 98 % | WEIGHT: 121 LBS | SYSTOLIC BLOOD PRESSURE: 110 MMHG | DIASTOLIC BLOOD PRESSURE: 70 MMHG | BODY MASS INDEX: 21.44 KG/M2

## 2019-02-22 DIAGNOSIS — I35.0 NONRHEUMATIC AORTIC (VALVE) STENOSIS: ICD-10-CM

## 2019-02-22 DIAGNOSIS — Z92.29 PERSONAL HISTORY OF OTHER DRUG THERAPY: ICD-10-CM

## 2019-02-22 PROCEDURE — 36415 COLL VENOUS BLD VENIPUNCTURE: CPT

## 2019-02-22 PROCEDURE — 99213 OFFICE O/P EST LOW 20 MIN: CPT | Mod: 25

## 2019-02-22 NOTE — HISTORY OF PRESENT ILLNESS
[FreeTextEntry1] : Patient presents for followup on hypertension/hyperlipidemia/AS. Patient is currently fasting for today's labs/no complaints.Patient is currently on diltiazem/toprol for hypertension, is controlling her cholesterol dietarily and pt is sched for TAVR on 3/8/19\par -saw vascular regarding chronic edema-compression stockings with ++benefit\par \par

## 2019-02-22 NOTE — ASSESSMENT
[FreeTextEntry1] : Labs will be sent out and  further recommendations will be made based on lab results. Patient advised to continue present medications with diet/exercise and specialist followup.RTO in 3-4months \par \par \par Shingrix vaccine d/w pt\par Last bone density was January of 2015-"to sched follow up soon"\par Last colonoscopy was 09, no further colonoscopies needed\par Declines gynecology exam\par  specialists  include\par 1.  endocrinology q. year-Dr. Song-NO LONGER GOES\par 2. dermatology-Dr. Cope\par 3.podiatry-DELCINES\par 4. ophthalmology.-Dr. Naranjo\par 5.cardiology-Dr. Alexis\par 6.plastic surgeon-Dr. Borjas-NO NEED FOR FOLLOW UP\par 7. Surgical oncologist-Dr. Lucia-NO need for follow up\par 8. Hand specialist-Dr. Karolina colon\par 9.ortho-Dr. De Leon\par 10.Vascular for edema-Dr. Chavis\par 11.Cardiothoracic-Dr. Jaimes\par thyroid sonogram-"to do follow up soon"\par mammogram was 12/15-"to do follow up soon"\par DANIA 1/2019\par Declines rheumatology evaluation to investigate + LAVINIA\par Declines HIV screening/has been tested for hepatitis C in the past

## 2019-02-25 ENCOUNTER — RESULT REVIEW (OUTPATIENT)
Age: 84
End: 2019-02-25

## 2019-02-25 LAB
ALBUMIN SERPL ELPH-MCNC: 4.1 G/DL
ALP BLD-CCNC: 104 U/L
ALT SERPL-CCNC: 28 U/L
ANION GAP SERPL CALC-SCNC: 11 MMOL/L
AST SERPL-CCNC: 30 U/L
BASOPHILS # BLD AUTO: 0.03 K/UL
BASOPHILS NFR BLD AUTO: 0.5 %
BILIRUB SERPL-MCNC: 0.4 MG/DL
BUN SERPL-MCNC: 32 MG/DL
CALCIUM SERPL-MCNC: 9.7 MG/DL
CHLORIDE SERPL-SCNC: 100 MMOL/L
CHOLEST SERPL-MCNC: 156 MG/DL
CHOLEST/HDLC SERPL: 2.8 RATIO
CO2 SERPL-SCNC: 27 MMOL/L
CREAT SERPL-MCNC: 0.7 MG/DL
EOSINOPHIL # BLD AUTO: 0 K/UL
EOSINOPHIL NFR BLD AUTO: 0 %
GLUCOSE SERPL-MCNC: 85 MG/DL
HCT VFR BLD CALC: 38.6 %
HDLC SERPL-MCNC: 55 MG/DL
HGB BLD-MCNC: 12.3 G/DL
IMM GRANULOCYTES NFR BLD AUTO: 0.2 %
LDLC SERPL CALC-MCNC: 88 MG/DL
LYMPHOCYTES # BLD AUTO: 1.54 K/UL
LYMPHOCYTES NFR BLD AUTO: 23.2 %
MAGNESIUM SERPL-MCNC: 2.2 MG/DL
MAN DIFF?: NORMAL
MCHC RBC-ENTMCNC: 29.1 PG
MCHC RBC-ENTMCNC: 31.9 GM/DL
MCV RBC AUTO: 91.3 FL
MONOCYTES # BLD AUTO: 0.97 K/UL
MONOCYTES NFR BLD AUTO: 14.6 %
NEUTROPHILS # BLD AUTO: 4.09 K/UL
NEUTROPHILS NFR BLD AUTO: 61.5 %
PLATELET # BLD AUTO: 205 K/UL
POTASSIUM SERPL-SCNC: 4.7 MMOL/L
PROT SERPL-MCNC: 6.8 G/DL
RBC # BLD: 4.23 M/UL
RBC # FLD: 16.3 %
SODIUM SERPL-SCNC: 138 MMOL/L
TRIGL SERPL-MCNC: 66 MG/DL
TSH SERPL-ACNC: 3.9 UIU/ML
WBC # FLD AUTO: 6.64 K/UL

## 2019-02-26 ENCOUNTER — OUTPATIENT (OUTPATIENT)
Dept: OUTPATIENT SERVICES | Facility: HOSPITAL | Age: 84
LOS: 1 days | End: 2019-02-26
Payer: MEDICARE

## 2019-02-26 ENCOUNTER — APPOINTMENT (OUTPATIENT)
Dept: PULMONOLOGY | Facility: CLINIC | Age: 84
End: 2019-02-26
Payer: MEDICARE

## 2019-02-26 VITALS
WEIGHT: 125.66 LBS | TEMPERATURE: 98 F | DIASTOLIC BLOOD PRESSURE: 70 MMHG | HEIGHT: 63 IN | HEART RATE: 76 BPM | RESPIRATION RATE: 16 BRPM | SYSTOLIC BLOOD PRESSURE: 120 MMHG

## 2019-02-26 DIAGNOSIS — I48.91 UNSPECIFIED ATRIAL FIBRILLATION: ICD-10-CM

## 2019-02-26 DIAGNOSIS — Z98.61 CORONARY ANGIOPLASTY STATUS: Chronic | ICD-10-CM

## 2019-02-26 DIAGNOSIS — I34.0 NONRHEUMATIC MITRAL (VALVE) INSUFFICIENCY: ICD-10-CM

## 2019-02-26 DIAGNOSIS — H26.40 UNSPECIFIED SECONDARY CATARACT: Chronic | ICD-10-CM

## 2019-02-26 DIAGNOSIS — I49.9 CARDIAC ARRHYTHMIA, UNSPECIFIED: ICD-10-CM

## 2019-02-26 DIAGNOSIS — Z98.890 OTHER SPECIFIED POSTPROCEDURAL STATES: Chronic | ICD-10-CM

## 2019-02-26 DIAGNOSIS — I25.10 ATHEROSCLEROTIC HEART DISEASE OF NATIVE CORONARY ARTERY WITHOUT ANGINA PECTORIS: ICD-10-CM

## 2019-02-26 DIAGNOSIS — R06.02 SHORTNESS OF BREATH: ICD-10-CM

## 2019-02-26 DIAGNOSIS — I10 ESSENTIAL (PRIMARY) HYPERTENSION: ICD-10-CM

## 2019-02-26 DIAGNOSIS — Z29.9 ENCOUNTER FOR PROPHYLACTIC MEASURES, UNSPECIFIED: ICD-10-CM

## 2019-02-26 DIAGNOSIS — Z01.818 ENCOUNTER FOR OTHER PREPROCEDURAL EXAMINATION: ICD-10-CM

## 2019-02-26 DIAGNOSIS — I35.0 NONRHEUMATIC AORTIC (VALVE) STENOSIS: ICD-10-CM

## 2019-02-26 LAB
ALBUMIN SERPL ELPH-MCNC: 4.2 G/DL — SIGNIFICANT CHANGE UP (ref 3.3–5.2)
ALP SERPL-CCNC: 119 U/L — SIGNIFICANT CHANGE UP (ref 40–120)
ALT FLD-CCNC: 31 U/L — SIGNIFICANT CHANGE UP
ANION GAP SERPL CALC-SCNC: 10 MMOL/L — SIGNIFICANT CHANGE UP (ref 5–17)
APPEARANCE UR: CLEAR — SIGNIFICANT CHANGE UP
APTT BLD: 30.9 SEC — SIGNIFICANT CHANGE UP (ref 27.5–36.3)
AST SERPL-CCNC: 37 U/L — HIGH
BASOPHILS # BLD AUTO: 0 K/UL — SIGNIFICANT CHANGE UP (ref 0–0.2)
BASOPHILS NFR BLD AUTO: 0.4 % — SIGNIFICANT CHANGE UP (ref 0–2)
BILIRUB SERPL-MCNC: 0.3 MG/DL — LOW (ref 0.4–2)
BILIRUB UR-MCNC: NEGATIVE — SIGNIFICANT CHANGE UP
BLD GP AB SCN SERPL QL: SIGNIFICANT CHANGE UP
BUN SERPL-MCNC: 27 MG/DL — HIGH (ref 8–20)
CALCIUM SERPL-MCNC: 9.5 MG/DL — SIGNIFICANT CHANGE UP (ref 8.6–10.2)
CHLORIDE SERPL-SCNC: 100 MMOL/L — SIGNIFICANT CHANGE UP (ref 98–107)
CO2 SERPL-SCNC: 28 MMOL/L — SIGNIFICANT CHANGE UP (ref 22–29)
COLOR SPEC: YELLOW — SIGNIFICANT CHANGE UP
CREAT SERPL-MCNC: 0.64 MG/DL — SIGNIFICANT CHANGE UP (ref 0.5–1.3)
DIFF PNL FLD: NEGATIVE — SIGNIFICANT CHANGE UP
EOSINOPHIL # BLD AUTO: 0 K/UL — SIGNIFICANT CHANGE UP (ref 0–0.5)
EOSINOPHIL NFR BLD AUTO: 0 % — SIGNIFICANT CHANGE UP (ref 0–6)
GLUCOSE SERPL-MCNC: 88 MG/DL — SIGNIFICANT CHANGE UP (ref 70–115)
GLUCOSE UR QL: NEGATIVE MG/DL — SIGNIFICANT CHANGE UP
HBA1C BLD-MCNC: 5.8 % — HIGH (ref 4–5.6)
HCT VFR BLD CALC: 39.7 % — SIGNIFICANT CHANGE UP (ref 37–47)
HGB BLD-MCNC: 12.9 G/DL — SIGNIFICANT CHANGE UP (ref 12–16)
INR BLD: 1.2 RATIO — HIGH (ref 0.88–1.16)
KETONES UR-MCNC: NEGATIVE — SIGNIFICANT CHANGE UP
LEUKOCYTE ESTERASE UR-ACNC: NEGATIVE — SIGNIFICANT CHANGE UP
LYMPHOCYTES # BLD AUTO: 1.9 K/UL — SIGNIFICANT CHANGE UP (ref 1–4.8)
LYMPHOCYTES # BLD AUTO: 25.4 % — SIGNIFICANT CHANGE UP (ref 20–55)
MCHC RBC-ENTMCNC: 28.9 PG — SIGNIFICANT CHANGE UP (ref 27–31)
MCHC RBC-ENTMCNC: 32.5 G/DL — SIGNIFICANT CHANGE UP (ref 32–36)
MCV RBC AUTO: 89 FL — SIGNIFICANT CHANGE UP (ref 81–99)
MONOCYTES # BLD AUTO: 0.8 K/UL — SIGNIFICANT CHANGE UP (ref 0–0.8)
MONOCYTES NFR BLD AUTO: 10.7 % — HIGH (ref 3–10)
MRSA PCR RESULT.: SIGNIFICANT CHANGE UP
NEUTROPHILS # BLD AUTO: 4.7 K/UL — SIGNIFICANT CHANGE UP (ref 1.8–8)
NEUTROPHILS NFR BLD AUTO: 63.2 % — SIGNIFICANT CHANGE UP (ref 37–73)
NITRITE UR-MCNC: NEGATIVE — SIGNIFICANT CHANGE UP
NT-PROBNP SERPL-SCNC: 1120 PG/ML — HIGH (ref 0–300)
PH UR: 5 — SIGNIFICANT CHANGE UP (ref 5–8)
PLATELET # BLD AUTO: 252 K/UL — SIGNIFICANT CHANGE UP (ref 150–400)
POTASSIUM SERPL-MCNC: 4.4 MMOL/L — SIGNIFICANT CHANGE UP (ref 3.5–5.3)
POTASSIUM SERPL-SCNC: 4.4 MMOL/L — SIGNIFICANT CHANGE UP (ref 3.5–5.3)
PREALB SERPL-MCNC: 19 MG/DL — SIGNIFICANT CHANGE UP (ref 18–38)
PROT SERPL-MCNC: 7.7 G/DL — SIGNIFICANT CHANGE UP (ref 6.6–8.7)
PROT UR-MCNC: NEGATIVE MG/DL — SIGNIFICANT CHANGE UP
PROTHROM AB SERPL-ACNC: 13.9 SEC — HIGH (ref 10–12.9)
RBC # BLD: 4.46 M/UL — SIGNIFICANT CHANGE UP (ref 4.4–5.2)
RBC # FLD: 16.4 % — HIGH (ref 11–15.6)
S AUREUS DNA NOSE QL NAA+PROBE: DETECTED
SODIUM SERPL-SCNC: 138 MMOL/L — SIGNIFICANT CHANGE UP (ref 135–145)
SP GR SPEC: 1.01 — SIGNIFICANT CHANGE UP (ref 1.01–1.02)
T3 SERPL-MCNC: 85 NG/DL — SIGNIFICANT CHANGE UP (ref 80–200)
T4 AB SER-ACNC: 5.4 UG/DL — SIGNIFICANT CHANGE UP (ref 4.5–12)
TSH SERPL-MCNC: 3.67 UIU/ML — SIGNIFICANT CHANGE UP (ref 0.27–4.2)
TYPE + AB SCN PNL BLD: SIGNIFICANT CHANGE UP
UROBILINOGEN FLD QL: NEGATIVE MG/DL — SIGNIFICANT CHANGE UP
WBC # BLD: 7.5 K/UL — SIGNIFICANT CHANGE UP (ref 4.8–10.8)
WBC # FLD AUTO: 7.5 K/UL — SIGNIFICANT CHANGE UP (ref 4.8–10.8)

## 2019-02-26 PROCEDURE — 94060 EVALUATION OF WHEEZING: CPT

## 2019-02-26 PROCEDURE — 93880 EXTRACRANIAL BILAT STUDY: CPT | Mod: 26

## 2019-02-26 PROCEDURE — 94664 DEMO&/EVAL PT USE INHALER: CPT | Mod: 59

## 2019-02-26 PROCEDURE — 71046 X-RAY EXAM CHEST 2 VIEWS: CPT | Mod: 26

## 2019-02-26 PROCEDURE — 93010 ELECTROCARDIOGRAM REPORT: CPT

## 2019-02-26 PROCEDURE — 94729 DIFFUSING CAPACITY: CPT

## 2019-02-26 PROCEDURE — 94727 GAS DIL/WSHOT DETER LNG VOL: CPT

## 2019-02-26 PROCEDURE — 85018 HEMOGLOBIN: CPT | Mod: QW

## 2019-02-26 NOTE — H&P PST ADULT - HISTORY OF PRESENT ILLNESS
This is an 84 yo female with Afib newly diagnosed presented to [facility] for TAVR in near future. PMHx includes HLD, calcific aortic stenosis  and scoliosis.  Patient had recent DANIA/CV, was unsuccessful and remains in Afib with RVR. Denies chest pain, sob, palps.    Echocardiogram 2018:  LV size and function normal EF 65%, moderate mitral calcification with mild to moderate MR, severe calcific aortic stenosis, peak gradient  35 and mean gradient of 8with estimated valve area 0.9.    Cardiac Catheterization 12/6/2018:  Low dose diuretic with elevated filling pressures, AF with rapid VR, low gradient, severe RCA and moderate LAD stenosis, preserved EF.

## 2019-02-26 NOTE — H&P PST ADULT - LAB RESULTS AND INTERPRETATION
2/27/2019 Cardiology department made aware of Positive MSSA results. (ANNE MARIE Pool ANP) 2/27/2019 Cardiology department made aware of Positive MSSA results. (ANNE MARIE VAUGHN)  2-28-19: urine culture >100,000 e coli called [facility] and spoke to Michelle ARIAS

## 2019-02-26 NOTE — H&P PST ADULT - ASSESSMENT
medications reviewed, instructions given on what medications to take and what not to take. medications reviewed, instructions given on what medications to take and what not to take. Asked the patient to take the Blood pressure medication/ heart medication on DOP.   Asked the patient to consult with PCP/cardiologist about holding ASA/Plavix and the pt  agreed.   FRANCIEI VTE 2.0 SCORE [CLOT updated 2019]    AGE RELATED RISK FACTORS                                                       MOBILITY RELATED FACTORS  [ ] Age 41-60 years                                            (1 Point)                    [ ] Bed rest                                                        (1 Point)  [ ] Age: 61-74 years                                           (2 Points)                  [ ] Plaster cast                                                   (2 Points)  [x ] Age= 75 years                                              (3 Points)                    [ ] Bed bound for more than 72 hours                 (2 Points)    DISEASE RELATED RISK FACTORS                                               GENDER SPECIFIC FACTORS  [x ] Edema in the lower extremities                       (1 Point)              [ ] Pregnancy                                                     (1 Point)  [ ] Varicose veins                                               (1 Point)                     [ ] Post-partum < 6 weeks                                   (1 Point)             [ ] BMI > 25 Kg/m2                                            (1 Point)                     [ ] Hormonal therapy  or oral contraception          (1 Point)                 [ ] Sepsis (in the previous month)                        (1 Point)               [ ] History of pregnancy complications                 (1 point)  [ ] Pneumonia or serious lung disease                                               [ ] Unexplained or recurrent                     (1 Point)           (in the previous month)                               (1 Point)  [ ] Abnormal pulmonary function test                     (1 Point)                 SURGERY RELATED RISK FACTORS  [ ] Acute myocardial infarction                              (1 Point)               [ ]  Section                                             (1 Point)  [ ] Congestive heart failure (in the previous month)  (1 Point)      [ ] Minor surgery                                                  (1 Point)   [ ] Inflammatory bowel disease                             (1 Point)               [ ] Arthroscopic surgery                                        (2 Points)  [ ] Central venous access                                      (2 Points)                [xlective arthroplasty                                         (5 points)    [ ] Stroke (in the previous month)                          (5 Points)                                                                                                                                                           HEMATOLOGY RELATED FACTORS                                                 TRAUMA RELATED RISK FACTORS  [ ] Prior episodes of VTE                                     (3 Points)                [ ] Fracture of the hip, pelvis, or leg                       (5 Points)  [ ] Positive family history for VTE                         (3 Points)             [ ] Acute spinal cord injury (in the previous month)  (5 Points)  [ ] Prothrombin 52230 A                                     (3 Points)               [ ] Paralysis  (less than 1 month)                             (5 Points)  [ ] Factor V Leiden                                             (3 Points)                  [ ] Multiple Trauma within 1 month                        (5 Points)  [ ] Lupus anticoagulants                                     (3 Points)                                                           [ ] Anticardiolipin antibodies                               (3 Points)                                                       [ ] High homocysteine in the blood                      (3 Points)                                             [ ] Other congenital or acquired thrombophilia      (3 Points)                                                [ ] Heparin induced thrombocytopenia                  (3 Points)                                     Total Score [      6)    OPIOID RISK TOOL    YUKI EACH BOX THAT APPLIES AND ADD TOTALS AT THE END    FAMILY HISTORY OF SUBSTANCE ABUSE                 FEMALE         MALE                                                Alcohol                             [  ]1 pt          [  ]3pts                                               Illegal Drugs                     [  ]2 pts        [  ]3pts                                               Rx Drugs                           [  ]4 pts        [  ]4 pts    PERSONAL HISTORY OF SUBSTANCE ABUSE                                                                                          Alcohol                             [  ]3 pts       [  ]3 pts                                               Illegal Drugs                     [  ]4 pts        [  ]4 pts                                               Rx Drugs                           [  ]5 pts        [  ]5 pts    AGE BETWEEN 16-45 YEARS                                      [  ]1 pt         [  ]1 pt    HISTORY OF PREADOLESCENT   SEXUAL ABUSE                                                             [  ]3 pts        [  ]0pts    PSYCHOLOGICAL DISEASE                     ADD, OCD, Bipolar, Schizophrenia        [  ]2 pts         [  ]2 pts                      Depression                                               [  ]1 pt           [  ]1 pt           SCORING TOTAL   (add numbers and type here)              (  0)                                     A score of 3 or lower indicated LOW risk for future opioid abuse  A score of 4 to 7 indicated moderate risk for future opioid abuse  A score of 8 or higher indicates a high risk for opioid abuse

## 2019-02-26 NOTE — H&P PST ADULT - PSH
After-cataract of both eyes    H/O colonoscopy    H/O varicose vein stripping    History of PTCA After-cataract of both eyes    H/O colonoscopy    H/O varicose vein stripping    History of PTCA  one stent

## 2019-02-26 NOTE — PATIENT PROFILE ADULT - NSPROEDALEARNPREF_GEN_A_NUR
individual instruction/verbal instruction/Pre op teaching and surgical scrub instructions given/written material

## 2019-02-26 NOTE — H&P PST ADULT - EKG AND INTERPRETATION
EKG reviewed, no acute changes, official reading pending. EKG reviewed, afib, official reading pending.

## 2019-03-07 ENCOUNTER — INPATIENT (INPATIENT)
Facility: HOSPITAL | Age: 84
LOS: 1 days | Discharge: HOME | DRG: 267 | End: 2019-03-09
Attending: THORACIC SURGERY (CARDIOTHORACIC VASCULAR SURGERY) | Admitting: THORACIC SURGERY (CARDIOTHORACIC VASCULAR SURGERY)
Payer: MEDICARE

## 2019-03-07 VITALS
SYSTOLIC BLOOD PRESSURE: 104 MMHG | HEIGHT: 60 IN | DIASTOLIC BLOOD PRESSURE: 62 MMHG | RESPIRATION RATE: 16 BRPM | HEART RATE: 112 BPM | TEMPERATURE: 97 F | WEIGHT: 128.09 LBS | OXYGEN SATURATION: 99 %

## 2019-03-07 DIAGNOSIS — H26.40 UNSPECIFIED SECONDARY CATARACT: Chronic | ICD-10-CM

## 2019-03-07 DIAGNOSIS — Z98.890 OTHER SPECIFIED POSTPROCEDURAL STATES: Chronic | ICD-10-CM

## 2019-03-07 DIAGNOSIS — Z98.61 CORONARY ANGIOPLASTY STATUS: Chronic | ICD-10-CM

## 2019-03-07 DIAGNOSIS — I35.0 NONRHEUMATIC AORTIC (VALVE) STENOSIS: ICD-10-CM

## 2019-03-07 LAB
ALBUMIN SERPL ELPH-MCNC: 4 G/DL — SIGNIFICANT CHANGE UP (ref 3.3–5.2)
ALP SERPL-CCNC: 105 U/L — SIGNIFICANT CHANGE UP (ref 40–120)
ALT FLD-CCNC: 33 U/L — HIGH
ANION GAP SERPL CALC-SCNC: 13 MMOL/L — SIGNIFICANT CHANGE UP (ref 5–17)
APPEARANCE UR: CLEAR — SIGNIFICANT CHANGE UP
APTT BLD: 32.9 SEC — SIGNIFICANT CHANGE UP (ref 27.5–36.3)
APTT BLD: 40.6 SEC — HIGH (ref 27.5–36.3)
AST SERPL-CCNC: 37 U/L — HIGH
BASOPHILS # BLD AUTO: 0 K/UL — SIGNIFICANT CHANGE UP (ref 0–0.2)
BASOPHILS NFR BLD AUTO: 0.3 % — SIGNIFICANT CHANGE UP (ref 0–2)
BILIRUB SERPL-MCNC: 0.7 MG/DL — SIGNIFICANT CHANGE UP (ref 0.4–2)
BILIRUB UR-MCNC: NEGATIVE — SIGNIFICANT CHANGE UP
BLD GP AB SCN SERPL QL: SIGNIFICANT CHANGE UP
BUN SERPL-MCNC: 29 MG/DL — HIGH (ref 8–20)
CALCIUM SERPL-MCNC: 9.1 MG/DL — SIGNIFICANT CHANGE UP (ref 8.6–10.2)
CHLORIDE SERPL-SCNC: 100 MMOL/L — SIGNIFICANT CHANGE UP (ref 98–107)
CO2 SERPL-SCNC: 25 MMOL/L — SIGNIFICANT CHANGE UP (ref 22–29)
COLOR SPEC: YELLOW — SIGNIFICANT CHANGE UP
CREAT SERPL-MCNC: 0.61 MG/DL — SIGNIFICANT CHANGE UP (ref 0.5–1.3)
DIFF PNL FLD: NEGATIVE — SIGNIFICANT CHANGE UP
EOSINOPHIL # BLD AUTO: 0 K/UL — SIGNIFICANT CHANGE UP (ref 0–0.5)
EOSINOPHIL NFR BLD AUTO: 0 % — SIGNIFICANT CHANGE UP (ref 0–6)
GLUCOSE SERPL-MCNC: 90 MG/DL — SIGNIFICANT CHANGE UP (ref 70–115)
GLUCOSE UR QL: NEGATIVE MG/DL — SIGNIFICANT CHANGE UP
HBA1C BLD-MCNC: 5.9 % — HIGH (ref 4–5.6)
HCT VFR BLD CALC: 35.8 % — LOW (ref 37–47)
HCT VFR BLD CALC: 36.8 % — LOW (ref 37–47)
HGB BLD-MCNC: 11.9 G/DL — LOW (ref 12–16)
HGB BLD-MCNC: 12.1 G/DL — SIGNIFICANT CHANGE UP (ref 12–16)
INR BLD: 1.17 RATIO — HIGH (ref 0.88–1.16)
KETONES UR-MCNC: NEGATIVE — SIGNIFICANT CHANGE UP
LEUKOCYTE ESTERASE UR-ACNC: NEGATIVE — SIGNIFICANT CHANGE UP
LYMPHOCYTES # BLD AUTO: 1.4 K/UL — SIGNIFICANT CHANGE UP (ref 1–4.8)
LYMPHOCYTES # BLD AUTO: 21.7 % — SIGNIFICANT CHANGE UP (ref 20–55)
MCHC RBC-ENTMCNC: 28.8 PG — SIGNIFICANT CHANGE UP (ref 27–31)
MCHC RBC-ENTMCNC: 28.9 PG — SIGNIFICANT CHANGE UP (ref 27–31)
MCHC RBC-ENTMCNC: 32.9 G/DL — SIGNIFICANT CHANGE UP (ref 32–36)
MCHC RBC-ENTMCNC: 33.2 G/DL — SIGNIFICANT CHANGE UP (ref 32–36)
MCV RBC AUTO: 86.9 FL — SIGNIFICANT CHANGE UP (ref 81–99)
MCV RBC AUTO: 87.6 FL — SIGNIFICANT CHANGE UP (ref 81–99)
MONOCYTES # BLD AUTO: 0.9 K/UL — HIGH (ref 0–0.8)
MONOCYTES NFR BLD AUTO: 13 % — HIGH (ref 3–10)
NEUTROPHILS # BLD AUTO: 4.3 K/UL — SIGNIFICANT CHANGE UP (ref 1.8–8)
NEUTROPHILS NFR BLD AUTO: 64.7 % — SIGNIFICANT CHANGE UP (ref 37–73)
NITRITE UR-MCNC: NEGATIVE — SIGNIFICANT CHANGE UP
NT-PROBNP SERPL-SCNC: 1518 PG/ML — HIGH (ref 0–300)
PH UR: 5 — SIGNIFICANT CHANGE UP (ref 5–8)
PLATELET # BLD AUTO: 229 K/UL — SIGNIFICANT CHANGE UP (ref 150–400)
PLATELET # BLD AUTO: 231 K/UL — SIGNIFICANT CHANGE UP (ref 150–400)
POTASSIUM SERPL-MCNC: 4 MMOL/L — SIGNIFICANT CHANGE UP (ref 3.5–5.3)
POTASSIUM SERPL-SCNC: 4 MMOL/L — SIGNIFICANT CHANGE UP (ref 3.5–5.3)
PROT SERPL-MCNC: 6.9 G/DL — SIGNIFICANT CHANGE UP (ref 6.6–8.7)
PROT UR-MCNC: NEGATIVE MG/DL — SIGNIFICANT CHANGE UP
PROTHROM AB SERPL-ACNC: 13.5 SEC — HIGH (ref 10–12.9)
RBC # BLD: 4.12 M/UL — LOW (ref 4.4–5.2)
RBC # BLD: 4.2 M/UL — LOW (ref 4.4–5.2)
RBC # FLD: 16.4 % — HIGH (ref 11–15.6)
RBC # FLD: 16.4 % — HIGH (ref 11–15.6)
SODIUM SERPL-SCNC: 138 MMOL/L — SIGNIFICANT CHANGE UP (ref 135–145)
SP GR SPEC: 1.01 — SIGNIFICANT CHANGE UP (ref 1.01–1.02)
UROBILINOGEN FLD QL: NEGATIVE MG/DL — SIGNIFICANT CHANGE UP
WBC # BLD: 6.6 K/UL — SIGNIFICANT CHANGE UP (ref 4.8–10.8)
WBC # BLD: 6.7 K/UL — SIGNIFICANT CHANGE UP (ref 4.8–10.8)
WBC # FLD AUTO: 6.6 K/UL — SIGNIFICANT CHANGE UP (ref 4.8–10.8)
WBC # FLD AUTO: 6.7 K/UL — SIGNIFICANT CHANGE UP (ref 4.8–10.8)

## 2019-03-07 PROCEDURE — 84443 ASSAY THYROID STIM HORMONE: CPT

## 2019-03-07 PROCEDURE — 86923 COMPATIBILITY TEST ELECTRIC: CPT

## 2019-03-07 PROCEDURE — 93010 ELECTROCARDIOGRAM REPORT: CPT

## 2019-03-07 PROCEDURE — 80053 COMPREHEN METABOLIC PANEL: CPT

## 2019-03-07 PROCEDURE — 85027 COMPLETE CBC AUTOMATED: CPT

## 2019-03-07 PROCEDURE — 87086 URINE CULTURE/COLONY COUNT: CPT

## 2019-03-07 PROCEDURE — 83880 ASSAY OF NATRIURETIC PEPTIDE: CPT

## 2019-03-07 PROCEDURE — 87640 STAPH A DNA AMP PROBE: CPT

## 2019-03-07 PROCEDURE — 71046 X-RAY EXAM CHEST 2 VIEWS: CPT

## 2019-03-07 PROCEDURE — G0463: CPT

## 2019-03-07 PROCEDURE — 86850 RBC ANTIBODY SCREEN: CPT

## 2019-03-07 PROCEDURE — 87186 SC STD MICRODIL/AGAR DIL: CPT

## 2019-03-07 PROCEDURE — 84134 ASSAY OF PREALBUMIN: CPT

## 2019-03-07 PROCEDURE — 84436 ASSAY OF TOTAL THYROXINE: CPT

## 2019-03-07 PROCEDURE — 36415 COLL VENOUS BLD VENIPUNCTURE: CPT

## 2019-03-07 PROCEDURE — 87641 MR-STAPH DNA AMP PROBE: CPT

## 2019-03-07 PROCEDURE — 93880 EXTRACRANIAL BILAT STUDY: CPT

## 2019-03-07 PROCEDURE — 86901 BLOOD TYPING SEROLOGIC RH(D): CPT

## 2019-03-07 PROCEDURE — 83036 HEMOGLOBIN GLYCOSYLATED A1C: CPT

## 2019-03-07 PROCEDURE — 84480 ASSAY TRIIODOTHYRONINE (T3): CPT

## 2019-03-07 PROCEDURE — 81003 URINALYSIS AUTO W/O SCOPE: CPT

## 2019-03-07 PROCEDURE — 71045 X-RAY EXAM CHEST 1 VIEW: CPT | Mod: 26

## 2019-03-07 PROCEDURE — 86900 BLOOD TYPING SEROLOGIC ABO: CPT

## 2019-03-07 PROCEDURE — 85610 PROTHROMBIN TIME: CPT

## 2019-03-07 PROCEDURE — 99221 1ST HOSP IP/OBS SF/LOW 40: CPT | Mod: 57

## 2019-03-07 PROCEDURE — 93005 ELECTROCARDIOGRAM TRACING: CPT

## 2019-03-07 PROCEDURE — 85730 THROMBOPLASTIN TIME PARTIAL: CPT

## 2019-03-07 RX ORDER — SODIUM CHLORIDE 9 MG/ML
3 INJECTION INTRAMUSCULAR; INTRAVENOUS; SUBCUTANEOUS EVERY 8 HOURS
Qty: 0 | Refills: 0 | Status: DISCONTINUED | OUTPATIENT
Start: 2019-03-07 | End: 2019-03-08

## 2019-03-07 RX ORDER — SPIRONOLACTONE 25 MG/1
25 TABLET, FILM COATED ORAL DAILY
Qty: 0 | Refills: 0 | Status: DISCONTINUED | OUTPATIENT
Start: 2019-03-07 | End: 2019-03-08

## 2019-03-07 RX ORDER — METOPROLOL TARTRATE 50 MG
50 TABLET ORAL DAILY
Qty: 0 | Refills: 0 | Status: DISCONTINUED | OUTPATIENT
Start: 2019-03-07 | End: 2019-03-08

## 2019-03-07 RX ORDER — CEFUROXIME AXETIL 250 MG
1500 TABLET ORAL ONCE
Qty: 0 | Refills: 0 | Status: DISCONTINUED | OUTPATIENT
Start: 2019-03-08 | End: 2019-03-08

## 2019-03-07 RX ORDER — DILTIAZEM HCL 120 MG
240 CAPSULE, EXT RELEASE 24 HR ORAL DAILY
Qty: 0 | Refills: 0 | Status: DISCONTINUED | OUTPATIENT
Start: 2019-03-07 | End: 2019-03-08

## 2019-03-07 RX ORDER — FUROSEMIDE 40 MG
40 TABLET ORAL DAILY
Qty: 0 | Refills: 0 | Status: DISCONTINUED | OUTPATIENT
Start: 2019-03-07 | End: 2019-03-08

## 2019-03-07 RX ORDER — CHLORHEXIDINE GLUCONATE 213 G/1000ML
1 SOLUTION TOPICAL
Qty: 0 | Refills: 0 | Status: DISCONTINUED | OUTPATIENT
Start: 2019-03-07 | End: 2019-03-08

## 2019-03-07 RX ORDER — CHLORHEXIDINE GLUCONATE 213 G/1000ML
15 SOLUTION TOPICAL
Qty: 0 | Refills: 0 | Status: DISCONTINUED | OUTPATIENT
Start: 2019-03-07 | End: 2019-03-08

## 2019-03-07 RX ORDER — ASPIRIN/CALCIUM CARB/MAGNESIUM 324 MG
81 TABLET ORAL DAILY
Qty: 0 | Refills: 0 | Status: DISCONTINUED | OUTPATIENT
Start: 2019-03-07 | End: 2019-03-08

## 2019-03-07 RX ORDER — HEPARIN SODIUM 5000 [USP'U]/ML
700 INJECTION INTRAVENOUS; SUBCUTANEOUS
Qty: 25000 | Refills: 0 | Status: DISCONTINUED | OUTPATIENT
Start: 2019-03-07 | End: 2019-03-08

## 2019-03-07 RX ADMIN — SPIRONOLACTONE 25 MILLIGRAM(S): 25 TABLET, FILM COATED ORAL at 12:08

## 2019-03-07 RX ADMIN — SODIUM CHLORIDE 3 MILLILITER(S): 9 INJECTION INTRAMUSCULAR; INTRAVENOUS; SUBCUTANEOUS at 21:31

## 2019-03-07 RX ADMIN — Medication 50 MILLIGRAM(S): at 12:07

## 2019-03-07 RX ADMIN — HEPARIN SODIUM 8 UNIT(S)/HR: 5000 INJECTION INTRAVENOUS; SUBCUTANEOUS at 19:55

## 2019-03-07 RX ADMIN — Medication 81 MILLIGRAM(S): at 12:07

## 2019-03-07 RX ADMIN — HEPARIN SODIUM 7 UNIT(S)/HR: 5000 INJECTION INTRAVENOUS; SUBCUTANEOUS at 11:59

## 2019-03-07 RX ADMIN — SODIUM CHLORIDE 3 MILLILITER(S): 9 INJECTION INTRAMUSCULAR; INTRAVENOUS; SUBCUTANEOUS at 12:08

## 2019-03-07 RX ADMIN — CHLORHEXIDINE GLUCONATE 1 APPLICATION(S): 213 SOLUTION TOPICAL at 21:31

## 2019-03-07 RX ADMIN — Medication 40 MILLIGRAM(S): at 12:04

## 2019-03-07 RX ADMIN — CHLORHEXIDINE GLUCONATE 15 MILLILITER(S): 213 SOLUTION TOPICAL at 21:33

## 2019-03-07 RX ADMIN — Medication 240 MILLIGRAM(S): at 12:07

## 2019-03-07 NOTE — CONSULT NOTE ADULT - ASSESSMENT
83F h/o AF on Eliquis (recent cardioversion unsuccessful), HLD, AS and kyphoscoliosis presents as a preadmit for heparin gtt prior to TAVR procedure.    Preop workup complete.  MSSA + nasal swab treated  Cleared for TAVR tomorrow.  D/W Dr You and Dr Hernández

## 2019-03-07 NOTE — CONSULT NOTE ADULT - ATTENDING COMMENTS
Above H& P reviewed and independently verified. Very pleasant 83 year old lady with symptomatic sever aortic stenosis. She has multiple comorbidities, and very frail. Given her advanced age, frailty, and comorbidities, she is at least at moderate risk for open surgical aortic valve replacement, and TAVR would be the preferred option for her.    This was explained to her in detail, and all questions were answered.

## 2019-03-07 NOTE — CONSULT NOTE ADULT - SUBJECTIVE AND OBJECTIVE BOX
History of Present Illness:  HPI:  This is an 84 yo female with Afib newly diagnosed presented to [facility] for TAVR in near future. PMHx includes HLD, calcific aortic stenosis  and scoliosis.  Patient had recent DANIA/CV, was unsuccessful and remains in Afib with RVR. Denies chest pain, sob, palps.    Echocardiogram 2018:  LV size and function normal EF 65%, moderate mitral calcification with mild to moderate MR, severe calcific aortic stenosis, peak gradient  35 and mean gradient of 8with estimated valve area 0.9.    Cardiac Catheterization 12/6/2018:  Low dose diuretic with elevated filling pressures, AF with rapid VR, low gradient, severe RCA and moderate LAD stenosis, preserved EF. (26 Feb 2019 14:23)      Current Subjective Assessment: "I have no symptoms" patient reports feeling well, sitting in chair eating lunch. NAD    Past Medical History  Atrial fibrillation  CAD (coronary artery disease)  Kyphoscoliosis  Aortic stenosis  SCC (squamous cell carcinoma)  Varicose vein of leg  Mitral valve disease  Hypertension      Past Surgical History  History of PTCA: one stent  After-cataract of both eyes  H/O colonoscopy  H/O varicose vein stripping      MEDICATIONS  (STANDING):  aspirin enteric coated 81 milliGRAM(s) Oral daily  chlorhexidine 0.12% Liquid 15 milliLiter(s) Swish and Spit two times a day  chlorhexidine 4% Liquid 1 Application(s) Topical two times a day  diltiazem    milliGRAM(s) Oral daily  furosemide    Tablet 40 milliGRAM(s) Oral daily  heparin  Infusion 700 Unit(s)/Hr (7 mL/Hr) IV Continuous <Continuous>  metoprolol succinate ER 50 milliGRAM(s) Oral daily  sodium chloride 0.9% lock flush 3 milliLiter(s) IV Push every 8 hours  spironolactone 25 milliGRAM(s) Oral daily    MEDICATIONS  (PRN):    Antiplatelet therapy:        ASA/Plavix                   Last dose/amt:    Allergies: No Known Allergies      SOCIAL HISTORY:  Smoker: [ ] Yes  [x ] No        PACK YEARS:                         WHEN QUIT?  ETOH use: [ ] Yes  [x ] No              FREQUENCY / QUANTITY:  Ilicit Drug use:  [ ] Yes  [x ] No  Occupation: homemaker  Live with: alone  Assist device use: none    PMD: Panda  Referring Cardiologist: Reuben    Relevant Family History  FAMILY HISTORY:  No pertinent family history in first degree relatives      Review of Systems  GENERAL:  Fevers[] chills[] sweats[] fatigue[] weight loss[] weight gain []                                      [x ] NEGATIVE  NEURO:  parathesias[] seizures []  syncope [x (unsure if syncope or fall some time ago)]  confusion []                     [ ] NEGATIVE                 EYES: glasses[]  blurry vision[]  discharge[] pain[] glaucoma []                                                           [x ] NEGATIVE                 ENMT:  difficulty hearing []  vertigo[]  dysphagia[] epistaxis[] recent dental work []                     [x ] NEGATIVE                 CV:  chest pain[] palpitations[] MONIQUE [] diaphoresis [] edema[]                                                           [x ] NEGATIVE                                 RESPIRATORY:  wheezing[] SOB[] cough [] sputum[] hemoptysis[]                                                   [x ] NEGATIVE               GI:  nausea[]  vomiting []  diarrhea[] constipation [] melena []                                                          [x ] NEGATIVE            : hematuria[ ]  dysuria[ ] urgency[] incontinence[]                                                                          [ x] NEGATIVE                    MUSKULOSKELETAL:  arthritis[ ]  joint swelling [ ] muscle weakness [ ]                                            [ x] NEGATIVE                     SKIN/BREAST:  rash[ ] itching [ ]  hair loss[ ] masses[ ]                                                                          [x ] NEGATIVE                     PSYCH:  dementia [ ] depression [ ] anxiety[ ]                                                                                          [x ] NEGATIVE                        HEME/LYMPH:  bruises easily[ ] enlarged lymph nodes[ ] tender lymph nodes[ ]                           [ x] NEGATIVE                      ENDOCRINE:  cold intolerance[ ] heat intolerance[ ] polydipsia[ ]                                                      [ x] NEGATIVE                        Telemetry: AF 90-100s    PHYSICAL EXAM  Vital Signs Last 24 Hrs  T(C): 36.3 (07 Mar 2019 09:49), Max: 36.3 (07 Mar 2019 09:49)  T(F): 97.3 (07 Mar 2019 09:49), Max: 97.3 (07 Mar 2019 09:49)  HR: 112 (07 Mar 2019 09:49) (112 - 112)  BP: 104/62 (07 Mar 2019 09:49) (104/62 - 104/62)  BP(mean): --  RR: 16 (07 Mar 2019 09:49) (16 - 16)  SpO2: 99% (07 Mar 2019 09:49) (99% - 99%)    General: Well nourished, well developed, no acute distress.                                                         Neuro: Normal exam oriented to person/place & time with no focal motor or sensory  deficits.                    Eyes: Normal exam of conjunctiva & lids, pupils equally reactive.   ENT: Normal exam of nasal/oral mucosa with absence of cyanosis.   Neck: Normal exam of jugular veins, trachea & thyroid.   Chest: Normal lung exam with good air movement absence of wheezes, rales, or rhonchi:                                                                          CV:  Auscultation: normal [ ] S3[ ] S4[ ] Irregular [ ] Rub[ ] Clicks[ ]  Murmurs none:[ ]systolic [ ]  diastolic [ ] holosystolic [ ]  Carotids: No Bruits[ ] Other____________ Abdominal Aorta: normal [ ] nonpalpable[ ]                                                                         GI: Normal exam of abdomen, liver & spleen with no noted masses or tenderness.                                                                                              Extremities: Normal no evidence of cyanosis or deformity Edema: none[ ]trace[ ]1+[ ]2+[ ]3+[ ]4+[ ]  Lower Extremity Pulses: Right[ ] Left[ ]Varicosities[ ]  SKIN : Normal exam to inspection & palpation.                                                           LABS:                      Cardiac Cath:    TTE / DANIA:    Assessment:  83y Female presents with     Plan: History of Present Illness:  HPI:  This is an 82 yo female with Afib newly diagnosed presented to [facility] for TAVR in near future. PMHx includes HLD, calcific aortic stenosis  and scoliosis.  Patient had recent DANIA/CV, was unsuccessful and remains in Afib with RVR. Denies chest pain, sob, palps.    Echocardiogram 2018:  LV size and function normal EF 65%, moderate mitral calcification with mild to moderate MR, severe calcific aortic stenosis, peak gradient  35 and mean gradient of 8with estimated valve area 0.9.    Cardiac Catheterization 12/6/2018:  Low dose diuretic with elevated filling pressures, AF with rapid VR, low gradient, severe RCA and moderate LAD stenosis, preserved EF. (26 Feb 2019 14:23)      Current Subjective Assessment: "I have no symptoms" patient reports feeling well, sitting in chair eating lunch. NAD    Past Medical History  Atrial fibrillation  CAD (coronary artery disease)  Kyphoscoliosis  Aortic stenosis  SCC (squamous cell carcinoma)  Varicose vein of leg  Mitral valve disease  Hypertension      Past Surgical History  History of PTCA: one stent  After-cataract of both eyes  H/O colonoscopy  H/O varicose vein stripping      MEDICATIONS  (STANDING):  aspirin enteric coated 81 milliGRAM(s) Oral daily  chlorhexidine 0.12% Liquid 15 milliLiter(s) Swish and Spit two times a day  chlorhexidine 4% Liquid 1 Application(s) Topical two times a day  diltiazem    milliGRAM(s) Oral daily  furosemide    Tablet 40 milliGRAM(s) Oral daily  heparin  Infusion 700 Unit(s)/Hr (7 mL/Hr) IV Continuous <Continuous>  metoprolol succinate ER 50 milliGRAM(s) Oral daily  sodium chloride 0.9% lock flush 3 milliLiter(s) IV Push every 8 hours  spironolactone 25 milliGRAM(s) Oral daily    MEDICATIONS  (PRN):    Antiplatelet therapy:        ASA/Plavix                   Last dose/amt:    Allergies: No Known Allergies      SOCIAL HISTORY:  Smoker: [ ] Yes  [x ] No        PACK YEARS:                         WHEN QUIT?  ETOH use: [ ] Yes  [x ] No              FREQUENCY / QUANTITY:  Ilicit Drug use:  [ ] Yes  [x ] No  Occupation: homemaker  Live with: alone  Assist device use: none    PMD: Panda  Referring Cardiologist: Reuben    Relevant Family History  FAMILY HISTORY:  No pertinent family history in first degree relatives      Review of Systems  GENERAL:  Fevers[] chills[] sweats[] fatigue[] weight loss[] weight gain []                                      [x ] NEGATIVE  NEURO:  parathesias[] seizures []  syncope [x (unsure if syncope or fall some time ago)]  confusion []                     [ ] NEGATIVE                 EYES: glasses[]  blurry vision[]  discharge[] pain[] glaucoma []                                                           [x ] NEGATIVE                 ENMT:  difficulty hearing []  vertigo[]  dysphagia[] epistaxis[] recent dental work []                     [x ] NEGATIVE                 CV:  chest pain[] palpitations[] MONIQUE [] diaphoresis [] edema[]                                                           [x ] NEGATIVE                                 RESPIRATORY:  wheezing[] SOB[] cough [] sputum[] hemoptysis[]                                                   [x ] NEGATIVE               GI:  nausea[]  vomiting []  diarrhea[] constipation [] melena []                                                          [x ] NEGATIVE            : hematuria[ ]  dysuria[ ] urgency[] incontinence[]                                                                          [ x] NEGATIVE                    MUSKULOSKELETAL:  arthritis[ ]  joint swelling [ ] muscle weakness [ ]                                            [ x] NEGATIVE                     SKIN/BREAST:  rash[ ] itching [ ]  hair loss[ ] masses[ ]                                                                          [x ] NEGATIVE                     PSYCH:  dementia [ ] depression [ ] anxiety[ ]                                                                                          [x ] NEGATIVE                        HEME/LYMPH:  bruises easily[ ] enlarged lymph nodes[ ] tender lymph nodes[ ]                           [ x] NEGATIVE                      ENDOCRINE:  cold intolerance[ ] heat intolerance[ ] polydipsia[ ]                                                      [ x] NEGATIVE                        Telemetry: AF 90-100s    PHYSICAL EXAM  Vital Signs Last 24 Hrs  T(C): 36.3 (07 Mar 2019 09:49), Max: 36.3 (07 Mar 2019 09:49)  T(F): 97.3 (07 Mar 2019 09:49), Max: 97.3 (07 Mar 2019 09:49)  HR: 112 (07 Mar 2019 09:49) (112 - 112)  BP: 104/62 (07 Mar 2019 09:49) (104/62 - 104/62)  BP(mean): --  RR: 16 (07 Mar 2019 09:49) (16 - 16)  SpO2: 99% (07 Mar 2019 09:49) (99% - 99%)    General: Well nourished, well developed, no acute distress.                                                         Neuro: Normal exam oriented to person/place & time with no focal motor or sensory  deficits.               Eyes: Normal exam of conjunctiva & lids, pupils equally reactive.   ENT: Normal exam of nasal/oral mucosa with absence of cyanosis.   Neck: Normal exam of jugular veins, trachea & thyroid.   Chest: Normal lung exam with good air movement absence of wheezes, rales, or rhonchi:                                                                          CV:  Auscultation: normal [ x] S3[ ] S4[ ] Irregular [ ] Rub[ ] Clicks[ ]  Murmurs none:[ ]systolic [x ]  diastolic [ ] holosystolic [ ]  Carotids: No Bruits[x ] Other____________ Abdominal Aorta: normal [x ] nonpalpable[ ]                                                                         GI: Normal exam of abdomen, liver & spleen with no noted masses or tenderness.                                                                                              Extremities: Normal no evidence of cyanosis or deformity Edema: none[ ]trace[ ]1+[ ]2+[ x L]3+[xR ]4+[ ] + erythema, chronic, no drainage. + scars from SCC removal  Lower Extremity Pulses: Right[+ ] Left[+ ]Varicosities[ ]  SKIN : Normal exam to inspection & palpation.                                                           LABS:        pending          < from: DANIA Echo Doppler (01.04.19 @ 10:26) >    Summary:   1. Left ventricular ejection fraction, by visual estimation, is 60 to   65%.   2. Moderately increased LV wall thickness.   3. Normal left ventricular internal cavity size.   4. The left ventricular diastolic function could not be assessed in this   study.   5. There is moderate concentric left ventricular hypertrophy.   6. Mildly increased RV wall thickness.   7. Moderately enlarged right ventricle.   8. Heavily calcified AV with virtually immobile right and noncoronary   cusps, movement of LCC only. Severe As on inspection with VETO 0.6-0.7 cm2   on planimetry. DOI 0.27. Peak gradient 36 mm hg c/w low gradient severe   AS. trace AI.   9. Degenerative mitral valve.  10. Mild mitral annular calcification.  11. Moderate mitral valveregurgitation.  12. Moderately enlarged LA max diameter 4.7 cm, enlarged hypokinetic MEGAN.   No evidence of EC nor thrombus in LA or MEGAN.  13. Color flow doppler and intravenous injection of agitated saline   demonstrates the presence of an intact intra atrial septum.  14. Poor leaflet coaptation secondary to enlarged RV / TVA. Severe TR   with underestimated PAP 38 mm hg.  15. Severe atheromatous changes of aortic root.  16. Simple atheroma seen in the aortic arch and descending aorta.  17. Thereis severe aortic root calcification.  18. In light of absence of intracardiac thrombus pt underwent attempt at   DCCV 200 J X2 without successful conversion to SR. Pt remained in AF with   CVR.    [id number] Leia Murray MD, Electronically signed on 1/4/2019 at 11:15:20   AM       < end of copied text >    < from: Cardiac Cath Lab - Adult (01.10.19 @ 15:59) >  CORONARY VESSELS:  RCA:   --  Proximal RCA: There was a 70 % stenosis.  COMPLICATIONS: No complications occurred during the cath lab visit.  DIAGNOSTIC IMPRESSIONS: ICS x 1 to proximal RCA w/ 0% residual stenosis and  CHANDU III flow pre and post procedure.  Severe AS/AF/HTN.  DIAGNOSTIC RECOMMENDATIONS: Asa/plavix.  To resume eliquis in 48 hrs.  Cardizem/lasix.  TAVR eval w/ Dr. Hernández as outpt.  INTERVENTIONAL IMPRESSIONS: ICS x 1 to proximal RCA w/ 0% residual stenosis  and CHANDU III flow pre and post procedure.  Severe AS/AF/HTN.  INTERVENTIONAL RECOMMENDATIONS: Asa/plavix.  To resume eliquis in 48 hrs.  Cardizem/lasix.  TAVR eval w/ Dr. Hernández as outpt.  Prepared and signed by  Ravi Valiente MD    < end of copied text >    < from: US Duplex Carotid Arteries Complete, Bilateral (02.26.19 @ 15:29) >  IMPRESSION:    Mild to moderate plaque in the carotid bulbs and proximal internal   carotid arteries with less than 50% internal carotid artery stenosis   bilaterally.    Arrhythmia noted.    Slightly low peak systolic velocities in the common and internal carotid   arteries, possibly technical, however correlation is recommended for   hypotension.    < end of copied text >    PFTs complete - mild to moderate asthma          Pt has AICD/PPM [ ] Yes  [ x] No             Brand Name:  Pre-op Beta Blocker ordered within 24 hrs of surgery (CABG ONLY)?  [ ] Yes  [ ] No  If not, Why?  Type & Cross  [ x] Yes  [ ] No  NPO after Midnight [ x] Yes  [ ] No  Pre-op ABX ordered, to be taped on chart:  [ x] Yes  [ ] No     Hibiclens/Peridex ordered [x ] Yes  [ ] No  Intraop Items Ordered: PRBCs, CXR, DANIA [ x]   Consent obtained, or blank in chart  [ ] Yes  [ ] No

## 2019-03-08 ENCOUNTER — APPOINTMENT (OUTPATIENT)
Dept: CARDIOTHORACIC SURGERY | Facility: HOSPITAL | Age: 84
End: 2019-03-08

## 2019-03-08 ENCOUNTER — TRANSCRIPTION ENCOUNTER (OUTPATIENT)
Age: 84
End: 2019-03-08

## 2019-03-08 LAB
ALBUMIN SERPL ELPH-MCNC: 3.3 G/DL — SIGNIFICANT CHANGE UP (ref 3.3–5.2)
ALBUMIN SERPL ELPH-MCNC: 3.5 G/DL — SIGNIFICANT CHANGE UP (ref 3.3–5.2)
ALP SERPL-CCNC: 88 U/L — SIGNIFICANT CHANGE UP (ref 40–120)
ALP SERPL-CCNC: 94 U/L — SIGNIFICANT CHANGE UP (ref 40–120)
ALT FLD-CCNC: 27 U/L — SIGNIFICANT CHANGE UP
ALT FLD-CCNC: 28 U/L — SIGNIFICANT CHANGE UP
ANION GAP SERPL CALC-SCNC: 12 MMOL/L — SIGNIFICANT CHANGE UP (ref 5–17)
ANION GAP SERPL CALC-SCNC: 12 MMOL/L — SIGNIFICANT CHANGE UP (ref 5–17)
ANISOCYTOSIS BLD QL: SLIGHT — SIGNIFICANT CHANGE UP
APTT BLD: 27.3 SEC — LOW (ref 27.5–36.3)
AST SERPL-CCNC: 29 U/L — SIGNIFICANT CHANGE UP
AST SERPL-CCNC: 31 U/L — SIGNIFICANT CHANGE UP
BILIRUB SERPL-MCNC: 0.4 MG/DL — SIGNIFICANT CHANGE UP (ref 0.4–2)
BILIRUB SERPL-MCNC: 0.6 MG/DL — SIGNIFICANT CHANGE UP (ref 0.4–2)
BUN SERPL-MCNC: 21 MG/DL — HIGH (ref 8–20)
BUN SERPL-MCNC: 28 MG/DL — HIGH (ref 8–20)
CALCIUM SERPL-MCNC: 8.5 MG/DL — LOW (ref 8.6–10.2)
CALCIUM SERPL-MCNC: 8.6 MG/DL — SIGNIFICANT CHANGE UP (ref 8.6–10.2)
CHLORIDE SERPL-SCNC: 102 MMOL/L — SIGNIFICANT CHANGE UP (ref 98–107)
CHLORIDE SERPL-SCNC: 98 MMOL/L — SIGNIFICANT CHANGE UP (ref 98–107)
CK SERPL-CCNC: 135 U/L — SIGNIFICANT CHANGE UP (ref 25–170)
CO2 SERPL-SCNC: 23 MMOL/L — SIGNIFICANT CHANGE UP (ref 22–29)
CO2 SERPL-SCNC: 23 MMOL/L — SIGNIFICANT CHANGE UP (ref 22–29)
CREAT SERPL-MCNC: 0.55 MG/DL — SIGNIFICANT CHANGE UP (ref 0.5–1.3)
CREAT SERPL-MCNC: 0.63 MG/DL — SIGNIFICANT CHANGE UP (ref 0.5–1.3)
GAS PNL BLDA: SIGNIFICANT CHANGE UP
GLUCOSE SERPL-MCNC: 130 MG/DL — HIGH (ref 70–115)
GLUCOSE SERPL-MCNC: 88 MG/DL — SIGNIFICANT CHANGE UP (ref 70–115)
HCT VFR BLD CALC: 35.6 % — LOW (ref 37–47)
HCT VFR BLD CALC: 37.8 % — SIGNIFICANT CHANGE UP (ref 37–47)
HGB BLD-MCNC: 11.4 G/DL — LOW (ref 12–16)
HGB BLD-MCNC: 12.5 G/DL — SIGNIFICANT CHANGE UP (ref 12–16)
INR BLD: 1.44 RATIO — HIGH (ref 0.88–1.16)
LYMPHOCYTES # BLD AUTO: 38 % — SIGNIFICANT CHANGE UP (ref 20–55)
MACROCYTES BLD QL: SLIGHT — SIGNIFICANT CHANGE UP
MAGNESIUM SERPL-MCNC: 1.9 MG/DL — SIGNIFICANT CHANGE UP (ref 1.8–2.6)
MCHC RBC-ENTMCNC: 28 PG — SIGNIFICANT CHANGE UP (ref 27–31)
MCHC RBC-ENTMCNC: 28.8 PG — SIGNIFICANT CHANGE UP (ref 27–31)
MCHC RBC-ENTMCNC: 32 G/DL — SIGNIFICANT CHANGE UP (ref 32–36)
MCHC RBC-ENTMCNC: 33.1 G/DL — SIGNIFICANT CHANGE UP (ref 32–36)
MCV RBC AUTO: 87.1 FL — SIGNIFICANT CHANGE UP (ref 81–99)
MCV RBC AUTO: 87.5 FL — SIGNIFICANT CHANGE UP (ref 81–99)
MONOCYTES NFR BLD AUTO: 12 % — HIGH (ref 3–10)
MRSA PCR RESULT.: SIGNIFICANT CHANGE UP
NEUTROPHILS NFR BLD AUTO: 50 % — SIGNIFICANT CHANGE UP (ref 37–73)
OVALOCYTES BLD QL SMEAR: SLIGHT — SIGNIFICANT CHANGE UP
PLAT MORPH BLD: NORMAL — SIGNIFICANT CHANGE UP
PLATELET # BLD AUTO: 187 K/UL — SIGNIFICANT CHANGE UP (ref 150–400)
PLATELET # BLD AUTO: 211 K/UL — SIGNIFICANT CHANGE UP (ref 150–400)
POTASSIUM SERPL-MCNC: 4.2 MMOL/L — SIGNIFICANT CHANGE UP (ref 3.5–5.3)
POTASSIUM SERPL-MCNC: 4.2 MMOL/L — SIGNIFICANT CHANGE UP (ref 3.5–5.3)
POTASSIUM SERPL-SCNC: 4.2 MMOL/L — SIGNIFICANT CHANGE UP (ref 3.5–5.3)
POTASSIUM SERPL-SCNC: 4.2 MMOL/L — SIGNIFICANT CHANGE UP (ref 3.5–5.3)
PROT SERPL-MCNC: 6.1 G/DL — LOW (ref 6.6–8.7)
PROT SERPL-MCNC: 6.5 G/DL — LOW (ref 6.6–8.7)
PROTHROM AB SERPL-ACNC: 16.7 SEC — HIGH (ref 10–12.9)
RBC # BLD: 4.07 M/UL — LOW (ref 4.4–5.2)
RBC # BLD: 4.34 M/UL — LOW (ref 4.4–5.2)
RBC # FLD: 16.3 % — HIGH (ref 11–15.6)
RBC # FLD: 16.5 % — HIGH (ref 11–15.6)
RBC BLD AUTO: ABNORMAL
S AUREUS DNA NOSE QL NAA+PROBE: SIGNIFICANT CHANGE UP
SODIUM SERPL-SCNC: 133 MMOL/L — LOW (ref 135–145)
SODIUM SERPL-SCNC: 137 MMOL/L — SIGNIFICANT CHANGE UP (ref 135–145)
TROPONIN T SERPL-MCNC: 0.08 NG/ML — HIGH (ref 0–0.06)
WBC # BLD: 5.6 K/UL — SIGNIFICANT CHANGE UP (ref 4.8–10.8)
WBC # BLD: 8 K/UL — SIGNIFICANT CHANGE UP (ref 4.8–10.8)
WBC # FLD AUTO: 5.6 K/UL — SIGNIFICANT CHANGE UP (ref 4.8–10.8)
WBC # FLD AUTO: 8 K/UL — SIGNIFICANT CHANGE UP (ref 4.8–10.8)

## 2019-03-08 PROCEDURE — 71045 X-RAY EXAM CHEST 1 VIEW: CPT | Mod: 26,76

## 2019-03-08 PROCEDURE — 93306 TTE W/DOPPLER COMPLETE: CPT | Mod: 26

## 2019-03-08 PROCEDURE — 93312 ECHO TRANSESOPHAGEAL: CPT | Mod: 26

## 2019-03-08 PROCEDURE — 33362 REPLACE AORTIC VALVE OPEN: CPT | Mod: Q0,62

## 2019-03-08 PROCEDURE — 93325 DOPPLER ECHO COLOR FLOW MAPG: CPT | Mod: 26,59

## 2019-03-08 RX ORDER — ASPIRIN/CALCIUM CARB/MAGNESIUM 324 MG
81 TABLET ORAL ONCE
Qty: 0 | Refills: 0 | Status: COMPLETED | OUTPATIENT
Start: 2019-03-08 | End: 2019-03-08

## 2019-03-08 RX ORDER — ASPIRIN/CALCIUM CARB/MAGNESIUM 324 MG
81 TABLET ORAL DAILY
Qty: 0 | Refills: 0 | Status: DISCONTINUED | OUTPATIENT
Start: 2019-03-09 | End: 2019-03-09

## 2019-03-08 RX ORDER — SODIUM CHLORIDE 9 MG/ML
1000 INJECTION INTRAMUSCULAR; INTRAVENOUS; SUBCUTANEOUS
Qty: 0 | Refills: 0 | Status: DISCONTINUED | OUTPATIENT
Start: 2019-03-08 | End: 2019-03-09

## 2019-03-08 RX ORDER — CEFUROXIME AXETIL 250 MG
1500 TABLET ORAL EVERY 8 HOURS
Qty: 0 | Refills: 0 | Status: COMPLETED | OUTPATIENT
Start: 2019-03-08 | End: 2019-03-09

## 2019-03-08 RX ORDER — APIXABAN 2.5 MG/1
2.5 TABLET, FILM COATED ORAL EVERY 12 HOURS
Qty: 0 | Refills: 0 | Status: CANCELLED | OUTPATIENT
Start: 2019-03-11 | End: 2019-03-09

## 2019-03-08 RX ORDER — SPIRONOLACTONE 25 MG/1
25 TABLET, FILM COATED ORAL DAILY
Qty: 0 | Refills: 0 | Status: DISCONTINUED | OUTPATIENT
Start: 2019-03-09 | End: 2019-03-09

## 2019-03-08 RX ORDER — MAGNESIUM SULFATE 500 MG/ML
2 VIAL (ML) INJECTION ONCE
Qty: 0 | Refills: 0 | Status: COMPLETED | OUTPATIENT
Start: 2019-03-08 | End: 2019-03-08

## 2019-03-08 RX ORDER — DILTIAZEM HCL 120 MG
240 CAPSULE, EXT RELEASE 24 HR ORAL DAILY
Qty: 0 | Refills: 0 | Status: DISCONTINUED | OUTPATIENT
Start: 2019-03-08 | End: 2019-03-08

## 2019-03-08 RX ORDER — PANTOPRAZOLE SODIUM 20 MG/1
40 TABLET, DELAYED RELEASE ORAL
Qty: 0 | Refills: 0 | Status: DISCONTINUED | OUTPATIENT
Start: 2019-03-08 | End: 2019-03-09

## 2019-03-08 RX ORDER — CLOPIDOGREL BISULFATE 75 MG/1
75 TABLET, FILM COATED ORAL DAILY
Qty: 0 | Refills: 0 | Status: DISCONTINUED | OUTPATIENT
Start: 2019-03-09 | End: 2019-03-09

## 2019-03-08 RX ORDER — FUROSEMIDE 40 MG
40 TABLET ORAL DAILY
Qty: 0 | Refills: 0 | Status: DISCONTINUED | OUTPATIENT
Start: 2019-03-09 | End: 2019-03-09

## 2019-03-08 RX ORDER — DOCUSATE SODIUM 100 MG
100 CAPSULE ORAL THREE TIMES A DAY
Qty: 0 | Refills: 0 | Status: DISCONTINUED | OUTPATIENT
Start: 2019-03-08 | End: 2019-03-09

## 2019-03-08 RX ADMIN — CHLORHEXIDINE GLUCONATE 15 MILLILITER(S): 213 SOLUTION TOPICAL at 05:53

## 2019-03-08 RX ADMIN — Medication 81 MILLIGRAM(S): at 21:25

## 2019-03-08 RX ADMIN — Medication 50 GRAM(S): at 14:50

## 2019-03-08 RX ADMIN — Medication 50 MILLIGRAM(S): at 05:53

## 2019-03-08 RX ADMIN — Medication 100 MILLIGRAM(S): at 17:57

## 2019-03-08 RX ADMIN — Medication 100 MILLIGRAM(S): at 23:18

## 2019-03-08 RX ADMIN — Medication 240 MILLIGRAM(S): at 05:53

## 2019-03-08 RX ADMIN — SPIRONOLACTONE 25 MILLIGRAM(S): 25 TABLET, FILM COATED ORAL at 05:53

## 2019-03-08 RX ADMIN — Medication 40 MILLIGRAM(S): at 05:53

## 2019-03-08 RX ADMIN — SODIUM CHLORIDE 3 MILLILITER(S): 9 INJECTION INTRAMUSCULAR; INTRAVENOUS; SUBCUTANEOUS at 05:49

## 2019-03-08 NOTE — BRIEF OPERATIVE NOTE - PRE-OP DX
Chronic diastolic (congestive) heart failure  03/08/2019    Active  Willis Nieto  Nonrheumatic aortic valve stenosis  03/08/2019    Active  Willis Nieto

## 2019-03-08 NOTE — BRIEF OPERATIVE NOTE - PROCEDURE
<<-----Click on this checkbox to enter Procedure TAVR (transcatheter aortic valve replacement)  03/08/2019  Via R CFA cutdown, 23mm Ben 3  Active  [id number]

## 2019-03-09 VITALS
DIASTOLIC BLOOD PRESSURE: 74 MMHG | RESPIRATION RATE: 20 BRPM | HEART RATE: 99 BPM | SYSTOLIC BLOOD PRESSURE: 91 MMHG | OXYGEN SATURATION: 96 %

## 2019-03-09 DIAGNOSIS — I50.32 CHRONIC DIASTOLIC (CONGESTIVE) HEART FAILURE: ICD-10-CM

## 2019-03-09 DIAGNOSIS — I10 ESSENTIAL (PRIMARY) HYPERTENSION: ICD-10-CM

## 2019-03-09 DIAGNOSIS — I25.10 ATHEROSCLEROTIC HEART DISEASE OF NATIVE CORONARY ARTERY WITHOUT ANGINA PECTORIS: ICD-10-CM

## 2019-03-09 DIAGNOSIS — I48.2 CHRONIC ATRIAL FIBRILLATION: ICD-10-CM

## 2019-03-09 LAB
ANION GAP SERPL CALC-SCNC: 9 MMOL/L — SIGNIFICANT CHANGE UP (ref 5–17)
BUN SERPL-MCNC: 29 MG/DL — HIGH (ref 8–20)
CALCIUM SERPL-MCNC: 8.7 MG/DL — SIGNIFICANT CHANGE UP (ref 8.6–10.2)
CHLORIDE SERPL-SCNC: 100 MMOL/L — SIGNIFICANT CHANGE UP (ref 98–107)
CK SERPL-CCNC: 118 U/L — SIGNIFICANT CHANGE UP (ref 25–170)
CO2 SERPL-SCNC: 25 MMOL/L — SIGNIFICANT CHANGE UP (ref 22–29)
CREAT SERPL-MCNC: 0.82 MG/DL — SIGNIFICANT CHANGE UP (ref 0.5–1.3)
GLUCOSE SERPL-MCNC: 173 MG/DL — HIGH (ref 70–115)
HCT VFR BLD CALC: 36.4 % — LOW (ref 37–47)
HGB BLD-MCNC: 12 G/DL — SIGNIFICANT CHANGE UP (ref 12–16)
MAGNESIUM SERPL-MCNC: 2.4 MG/DL — SIGNIFICANT CHANGE UP (ref 1.6–2.6)
MCHC RBC-ENTMCNC: 28.6 PG — SIGNIFICANT CHANGE UP (ref 27–31)
MCHC RBC-ENTMCNC: 33 G/DL — SIGNIFICANT CHANGE UP (ref 32–36)
MCV RBC AUTO: 86.7 FL — SIGNIFICANT CHANGE UP (ref 81–99)
PLATELET # BLD AUTO: 222 K/UL — SIGNIFICANT CHANGE UP (ref 150–400)
POTASSIUM SERPL-MCNC: 4.8 MMOL/L — SIGNIFICANT CHANGE UP (ref 3.5–5.3)
POTASSIUM SERPL-SCNC: 4.8 MMOL/L — SIGNIFICANT CHANGE UP (ref 3.5–5.3)
RBC # BLD: 4.2 M/UL — LOW (ref 4.4–5.2)
RBC # FLD: 16.1 % — HIGH (ref 11–15.6)
SODIUM SERPL-SCNC: 134 MMOL/L — LOW (ref 135–145)
TROPONIN T SERPL-MCNC: 0.08 NG/ML — HIGH (ref 0–0.06)
WBC # BLD: 8.2 K/UL — SIGNIFICANT CHANGE UP (ref 4.8–10.8)
WBC # FLD AUTO: 8.2 K/UL — SIGNIFICANT CHANGE UP (ref 4.8–10.8)

## 2019-03-09 PROCEDURE — 84132 ASSAY OF SERUM POTASSIUM: CPT

## 2019-03-09 PROCEDURE — C1769: CPT

## 2019-03-09 PROCEDURE — 83036 HEMOGLOBIN GLYCOSYLATED A1C: CPT

## 2019-03-09 PROCEDURE — 83880 ASSAY OF NATRIURETIC PEPTIDE: CPT

## 2019-03-09 PROCEDURE — 82947 ASSAY GLUCOSE BLOOD QUANT: CPT

## 2019-03-09 PROCEDURE — 93306 TTE W/DOPPLER COMPLETE: CPT

## 2019-03-09 PROCEDURE — 93010 ELECTROCARDIOGRAM REPORT: CPT

## 2019-03-09 PROCEDURE — 85730 THROMBOPLASTIN TIME PARTIAL: CPT

## 2019-03-09 PROCEDURE — 80048 BASIC METABOLIC PNL TOTAL CA: CPT

## 2019-03-09 PROCEDURE — 81003 URINALYSIS AUTO W/O SCOPE: CPT

## 2019-03-09 PROCEDURE — 82435 ASSAY OF BLOOD CHLORIDE: CPT

## 2019-03-09 PROCEDURE — 93320 DOPPLER ECHO COMPLETE: CPT

## 2019-03-09 PROCEDURE — 82550 ASSAY OF CK (CPK): CPT

## 2019-03-09 PROCEDURE — 93325 DOPPLER ECHO COLOR FLOW MAPG: CPT

## 2019-03-09 PROCEDURE — 87640 STAPH A DNA AMP PROBE: CPT

## 2019-03-09 PROCEDURE — 83735 ASSAY OF MAGNESIUM: CPT

## 2019-03-09 PROCEDURE — 86900 BLOOD TYPING SEROLOGIC ABO: CPT

## 2019-03-09 PROCEDURE — 84295 ASSAY OF SERUM SODIUM: CPT

## 2019-03-09 PROCEDURE — 76000 FLUOROSCOPY <1 HR PHYS/QHP: CPT

## 2019-03-09 PROCEDURE — 85014 HEMATOCRIT: CPT

## 2019-03-09 PROCEDURE — 84484 ASSAY OF TROPONIN QUANT: CPT

## 2019-03-09 PROCEDURE — C1889: CPT

## 2019-03-09 PROCEDURE — 97163 PT EVAL HIGH COMPLEX 45 MIN: CPT

## 2019-03-09 PROCEDURE — 82330 ASSAY OF CALCIUM: CPT

## 2019-03-09 PROCEDURE — C1894: CPT

## 2019-03-09 PROCEDURE — C1887: CPT

## 2019-03-09 PROCEDURE — 86923 COMPATIBILITY TEST ELECTRIC: CPT

## 2019-03-09 PROCEDURE — 85027 COMPLETE CBC AUTOMATED: CPT

## 2019-03-09 PROCEDURE — 83605 ASSAY OF LACTIC ACID: CPT

## 2019-03-09 PROCEDURE — 86850 RBC ANTIBODY SCREEN: CPT

## 2019-03-09 PROCEDURE — 93005 ELECTROCARDIOGRAM TRACING: CPT

## 2019-03-09 PROCEDURE — 71045 X-RAY EXAM CHEST 1 VIEW: CPT | Mod: 26

## 2019-03-09 PROCEDURE — 86901 BLOOD TYPING SEROLOGIC RH(D): CPT

## 2019-03-09 PROCEDURE — 99239 HOSP IP/OBS DSCHRG MGMT >30: CPT

## 2019-03-09 PROCEDURE — 85610 PROTHROMBIN TIME: CPT

## 2019-03-09 PROCEDURE — 36415 COLL VENOUS BLD VENIPUNCTURE: CPT

## 2019-03-09 PROCEDURE — 82803 BLOOD GASES ANY COMBINATION: CPT

## 2019-03-09 PROCEDURE — 71045 X-RAY EXAM CHEST 1 VIEW: CPT

## 2019-03-09 PROCEDURE — 80053 COMPREHEN METABOLIC PANEL: CPT

## 2019-03-09 PROCEDURE — 93312 ECHO TRANSESOPHAGEAL: CPT

## 2019-03-09 RX ORDER — SPIRONOLACTONE 25 MG/1
1 TABLET, FILM COATED ORAL
Qty: 0 | Refills: 0 | COMMUNITY

## 2019-03-09 RX ORDER — FUROSEMIDE 40 MG
1 TABLET ORAL
Qty: 30 | Refills: 0
Start: 2019-03-09 | End: 2019-04-07

## 2019-03-09 RX ORDER — DILTIAZEM HCL 120 MG
1 CAPSULE, EXT RELEASE 24 HR ORAL
Qty: 0 | Refills: 0 | DISCHARGE
Start: 2019-03-09

## 2019-03-09 RX ORDER — PANTOPRAZOLE SODIUM 20 MG/1
1 TABLET, DELAYED RELEASE ORAL
Qty: 14 | Refills: 0
Start: 2019-03-09 | End: 2019-03-22

## 2019-03-09 RX ORDER — APIXABAN 2.5 MG/1
1 TABLET, FILM COATED ORAL
Qty: 60 | Refills: 0
Start: 2019-03-09 | End: 2019-04-07

## 2019-03-09 RX ORDER — DOCUSATE SODIUM 100 MG
1 CAPSULE ORAL
Qty: 90 | Refills: 0
Start: 2019-03-09 | End: 2019-04-07

## 2019-03-09 RX ORDER — SPIRONOLACTONE 25 MG/1
1 TABLET, FILM COATED ORAL
Qty: 30 | Refills: 0
Start: 2019-03-09

## 2019-03-09 RX ORDER — ASPIRIN/CALCIUM CARB/MAGNESIUM 324 MG
1 TABLET ORAL
Qty: 30 | Refills: 0
Start: 2019-03-09 | End: 2019-04-07

## 2019-03-09 RX ORDER — DILTIAZEM HCL 120 MG
120 CAPSULE, EXT RELEASE 24 HR ORAL DAILY
Qty: 0 | Refills: 0 | Status: DISCONTINUED | OUTPATIENT
Start: 2019-03-09 | End: 2019-03-09

## 2019-03-09 RX ORDER — CLOPIDOGREL BISULFATE 75 MG/1
1 TABLET, FILM COATED ORAL
Qty: 30 | Refills: 0
Start: 2019-03-09 | End: 2019-04-07

## 2019-03-09 RX ORDER — DILTIAZEM HCL 120 MG
1 CAPSULE, EXT RELEASE 24 HR ORAL
Qty: 30 | Refills: 0
Start: 2019-03-09 | End: 2019-04-07

## 2019-03-09 RX ORDER — FUROSEMIDE 40 MG
1 TABLET ORAL
Qty: 0 | Refills: 0 | COMMUNITY

## 2019-03-09 RX ADMIN — PANTOPRAZOLE SODIUM 40 MILLIGRAM(S): 20 TABLET, DELAYED RELEASE ORAL at 06:01

## 2019-03-09 RX ADMIN — Medication 100 MILLIGRAM(S): at 05:58

## 2019-03-09 RX ADMIN — CLOPIDOGREL BISULFATE 75 MILLIGRAM(S): 75 TABLET, FILM COATED ORAL at 11:37

## 2019-03-09 RX ADMIN — Medication 81 MILLIGRAM(S): at 11:37

## 2019-03-09 RX ADMIN — Medication 100 MILLIGRAM(S): at 10:58

## 2019-03-09 RX ADMIN — Medication 100 MILLIGRAM(S): at 01:54

## 2019-03-09 RX ADMIN — Medication 40 MILLIGRAM(S): at 05:59

## 2019-03-09 RX ADMIN — SPIRONOLACTONE 25 MILLIGRAM(S): 25 TABLET, FILM COATED ORAL at 05:59

## 2019-03-09 NOTE — PROGRESS NOTE ADULT - PROBLEM SELECTOR PLAN 3
as above  restarted on asa and plavix   eliquis to restart on sunday   pending betablocker possible restart as outpatient

## 2019-03-09 NOTE — DISCHARGE NOTE ADULT - CARE PROVIDER_API CALL
Tee Hernández)  Surgery; Thoracic and Cardiac Surgery  [address] [place] [address]  Phone: [phone]  Fax: [phone]  Follow Up Time:     Ravi Valiente)  Cardiovascular Disease; Interventional Cardiology  [address] [place] [address]  Phone: [phone]  Fax: [phone]  Follow Up Time:

## 2019-03-09 NOTE — DISCHARGE NOTE ADULT - HOSPITAL COURSE
83 F with known AS and PmHx of afib incomplete RBB, HLD, HTN, moderate MR, CAD s/p recent PCI to RCA on 1/19 presented through [facility] for elective TAVR via right groin     came out extubated and hemodynamically stable, sheaths d/c without incident   restarted on appropriate medications as per surgeon    d/w team: patient to go home today 83 F with known AS and PmHx of afib incomplete RBB, HLD, HTN, moderate MR, CAD s/p recent PCI to RCA on 1/19 presented through [facility] for elective TAVR via right groin     came out extubated and hemodynamically stable, sheaths d/c without incident   restarted on appropriate medications as per surgeon    d/w team: patient to go home today     Discharge planning greater than thirty minutes. Provider educated patient on current condition, co morbidities, medications, side effects, importance of diet and exercise  Patient and family educated on importance of decompensation in condition.  Follow up appointment education also provided.

## 2019-03-09 NOTE — PROGRESS NOTE ADULT - ASSESSMENT
83 F with known AS and PmHx of afib incomplete RBB, HLD, HTN, moderate MR, CAD s/p recent PCI to RCA on 1/19 presented through [facility] for elective TAVR via right groin     currently pending OB--> chair   came out extubated, hemodynamically stable, sheaths d/c currently on most home medications   will discuss cardizem and toporol   johnson remains in place for comfort overnight pending d.c in am   deline in AM     possible d.c in am

## 2019-03-09 NOTE — PHYSICAL THERAPY INITIAL EVALUATION ADULT - ADDITIONAL COMMENTS
Pt reports living alone in a private house. 2 steps to enter with rail. Normally sleeps in second floor bedroom with full flight and left ascending rail. Is able to stay on main floor. Unclear if pt's son will be staying with her.  It is recommended that pt have assist at home PRN and pt stay on main floor upon discharge from [facility].

## 2019-03-09 NOTE — DISCHARGE NOTE ADULT - SECONDARY DIAGNOSIS.
Coronary artery disease involving native coronary artery of native heart without angina pectoris Essential hypertension Chronic diastolic heart failure Hyperlipidemia, unspecified hyperlipidemia type

## 2019-03-09 NOTE — PROGRESS NOTE ADULT - PROBLEM SELECTOR PLAN 2
as above  lasix and aldactone to restart   pending betablocker currently normotensive to discuss with team

## 2019-03-09 NOTE — PROGRESS NOTE ADULT - SUBJECTIVE AND OBJECTIVE BOX
Formerly [facility], [facility]                                   [address] [place] [address]                                                      PHONE: [phone]                                                         FAX: [phone]  [email or web address]  ---------------------------------------------------------------------------------------------------------------------------------    Overnight events/patient complaints:  s/p TAVR     PAST MEDICAL & SURGICAL HISTORY:  Atrial fibrillation  CAD (coronary artery disease)  Kyphoscoliosis  Aortic stenosis  SCC (squamous cell carcinoma)  Varicose vein of leg  Mitral valve disease  Hypertension  History of PTCA: one stent  After-cataract of both eyes  H/O colonoscopy  H/O varicose vein stripping      No Known Allergies    MEDICATIONS  (STANDING):  aspirin enteric coated 81 milliGRAM(s) Oral daily  cefuroxime  IVPB 1500 milliGRAM(s) IV Intermittent every 8 hours  clopidogrel Tablet 75 milliGRAM(s) Oral daily  diltiazem    milliGRAM(s) Oral daily  docusate sodium 100 milliGRAM(s) Oral three times a day  furosemide    Tablet 40 milliGRAM(s) Oral daily  pantoprazole    Tablet 40 milliGRAM(s) Oral before breakfast  sodium chloride 0.9%. 1000 milliLiter(s) (10 mL/Hr) IV Continuous <Continuous>  spironolactone 25 milliGRAM(s) Oral daily    MEDICATIONS  (PRN):      Vital Signs Last 24 Hrs  T(C): 36.6 (09 Mar 2019 07:30), Max: 36.6 (08 Mar 2019 18:37)  T(F): 97.9 (09 Mar 2019 07:30), Max: 97.9 (09 Mar 2019 07:30)  HR: 95 (09 Mar 2019 06:00) (77 - 108)  BP: --  BP(mean): --  RR: 28 (09 Mar 2019 06:00) (12 - 34)  SpO2: 95% (09 Mar 2019 06:00) (91% - 100%)  ICU Vital Signs Last 24 Hrs  SHERMAN HIGGINBOTHAMONEY  I&O's Detail    08 Mar 2019 07:01  -  09 Mar 2019 07:00  --------------------------------------------------------  IN:    Oral Fluid: 340 mL    Solution: 50 mL    Solution: 100 mL  Total IN: 490 mL    OUT:    Indwelling Catheter - Urethral: 1425 mL  Total OUT: 1425 mL    Total NET: -935 mL        I&O's Summary    08 Mar 2019 07:01  -  09 Mar 2019 07:00  --------------------------------------------------------  IN: 490 mL / OUT: 1425 mL / NET: -935 mL      Drug Dosing Weight  SHERMAN BLACKWELL      PHYSICAL EXAM:  General: Appears well developed, well nourished alert and cooperative.  HEENT: Head; normocephalic, atraumatic.  Eyes: Pupils reactive, cornea wnl.  Neck: Supple, no nodes adenopathy, no NVD or carotid bruit or thyromegaly.  CARDIOVASCULAR: Normal S1 and S2, No murmur, rub, gallop or lift.   LUNGS: No rales, rhonchi or wheeze. Normal breath sounds bilaterally.  ABDOMEN: Soft, nontender without mass or organomegaly. bowel sounds normoactive.  EXTREMITIES: No clubbing, cyanosis or edema. Distal pulses wnl.   SKIN: warm and dry with normal turgor.  NEURO: Alert/oriented x 3/normal motor exam. No pathologic reflexes.    PSYCH: normal affect.        LABS:                        12.0   8.2   )-----------( 222      ( 09 Mar 2019 02:29 )             36.4     03-09    134<L>  |  100  |  29.0<H>  ----------------------------<  173<H>  4.8   |  25.0  |  0.82    Ca    8.7      09 Mar 2019 02:29  Mg     2.4     03-09    TPro  6.5<L>  /  Alb  3.5  /  TBili  0.6  /  DBili  x   /  AST  31  /  ALT  28  /  AlkPhos  94  03-08    SHERMAN BLACKWELL  CARDIAC MARKERS ( 09 Mar 2019 02:29 )  x     / 0.08 ng/mL / 118 U/L / x     / x      CARDIAC MARKERS ( 08 Mar 2019 13:07 )  x     / 0.08 ng/mL / 135 U/L / x     / x          PT/INR - ( 08 Mar 2019 13:07 )   PT: 16.7 sec;   INR: 1.44 ratio         PTT - ( 08 Mar 2019 13:07 )  PTT:27.3 sec  Urinalysis Basic - ( 07 Mar 2019 15:23 )    Color: Yellow / Appearance: Clear / S.015 / pH: x  Gluc: x / Ketone: Negative  / Bili: Negative / Urobili: Negative mg/dL   Blood: x / Protein: Negative mg/dL / Nitrite: Negative   Leuk Esterase: Negative / RBC: x / WBC x   Sq Epi: x / Non Sq Epi: x / Bacteria: x        RADIOLOGY & ADDITIONAL STUDIES:       ASSESSMENT AND PLAN:  In summary, SHERMAN BLACKWELL is an 83y Female with past medical history significant for AS.  Pt is s/p TAVR 23 mm ladonna valve  (3/9/2019)    -BB  held and CCB cut in half as per CTS protocol   -continue remainder of cardiac meds/dosages  -stable for d/c home today      Thank you for allowing [facility] to participate in the care of this patient.  Please feel free to call with any questions or concerns.

## 2019-03-09 NOTE — DISCHARGE NOTE ADULT - MEDICATION SUMMARY - MEDICATIONS TO STOP TAKING
I will STOP taking the medications listed below when I get home from the hospital:    Metoprolol Succinate ER 50 mg oral tablet, extended release  -- 1 tab(s) by mouth once a day    Bactroban 2% nasal ointment  -- 1 application intranasally 2 times a day   -- For the nose.

## 2019-03-09 NOTE — DISCHARGE NOTE ADULT - NS AS ACTIVITY OBS
Wash incisions with soap and water in shower daily, allow the water to wash over incision, do not scrub or pick at scabs, Baby Soap or unscented soaps are a nice mild soap to consider.  Do not use any ointment or lotions on your insicions.  Please call the office at [phone] if you have any drainage or concern about your wounds.  Please go to the ED for fevers, chills./Stairs allowed/No Heavy lifting/straining/Do not make important decisions/Do not drive or operate machinery/Walking-Outdoors allowed/Showering allowed/Walking-Indoors allowed

## 2019-03-09 NOTE — DISCHARGE NOTE ADULT - MEDICATION SUMMARY - MEDICATIONS TO TAKE
I will START or STAY ON the medications listed below when I get home from the hospital:    spironolactone 25 mg oral tablet  -- 1 tab(s) by mouth once a day  -- Indication: For diuretic / heart failure    aspirin 81 mg oral delayed release tablet  -- 1 tab(s) by mouth once a day  -- Indication: For Coronary artery disease / blood thinner    dilTIAZem 120 mg/24 hours oral capsule, extended release  -- 1 cap(s) by mouth once a day  -- Indication: For atrial fibrillation     apixaban 2.5 mg oral tablet  -- 1 tab(s) by mouth every 12 hours    START ON 3/11   *** MONDAY   -- Indication: For blood thinner/ atrial fib    clopidogrel 75 mg oral tablet  -- 1 tab(s) by mouth once a day  -- Indication: For blood thinner/ coronary artery disease    furosemide 40 mg oral tablet  -- 1 tab(s) by mouth once a day  -- Indication: For diuretic / heart failure    docusate sodium 100 mg oral capsule  -- 1 cap(s) by mouth 3 times a day, As Needed -for constipation   -- Indication: For laxative    pantoprazole 40 mg oral delayed release tablet  -- 1 tab(s) by mouth once a day (before a meal)  -- Indication: For gerd/ reflux

## 2019-03-09 NOTE — DISCHARGE NOTE ADULT - PATIENT PORTAL LINK FT
You can access the [facility] Patient Portal, offered by [facility], by registering with the following website: [email or web address]

## 2019-03-09 NOTE — DISCHARGE NOTE ADULT - PLAN OF CARE
recover from procedure Please follow up with Dr coulter in the office in four to five weeks, if you do not receive a call from the office on monday please call [phone] to make an appointment.      - Keep the groin site clean and dry.  You may shower and pat the site dry.  - Watch the site for signs of redness or drainage, these should be reported to your doctor immediately.  - You will receive a wallet card about your new valve in the mail.  Please carry it with you to present to anyone who may ask if you have any medical implants.  - Be sure to inform your doctors including your dentist about your valve since you will need to take antibiotics to reduce the risk of infection before certain medical and dental procedures.  - You will be given an appointment to follow up with your doctor in approximately 4 weeks.  It is important to keep this appointment so that your new valve can be assessed.  - You may resume all you normal activities.     Please take all of your medications as prescribed.  If you have any questions about your medications please feel to call our office at [phone].     Please come to ED or Call Cardio thoracic office at [phone] if Chest pain, Shortness of Breath, persistent Nausea & vomiting, oozing from wounds, 2 lb increase in weight in 24 hours. Please continue to take your aspirin, plavix and eliquis.  Please also continue to follow up with your cardiologist in a month.    Please take all of your medications as prescribed.  If you have any questions about your medications please feel to call our office at [phone]. Please continue a low sodium diet as previously prescribed.    Please take all of your medications as prescribed.  If you have any questions about your medications please feel to call our office at [phone]. You are being discharged with a diagnosis of heart failure. To prevent exacerbation of congestive heart failure (CHF) it is important to follow a heart-healthy, LOW SODIUM diet. You should read all food labels and keep your sodium intake less than 1500 mg per day. Examples of high sodium foods include fast food or processed food (ie frozen TV dinners), chinese food, soup and regular cold cuts. You should also restrict your fluid intake to less than 1-1.5 liters per day. Please weigh yourself every day at the same time in the morning and document your weight in a weight log. Please call your doctor right away if you gain over 2-3 pounds in one day, or you notice an increase in leg swelling, abdominal swelling or shortness of breath. Making sure you take all of your medications as prescribed and maintaining a normal blood pressure are also important for preventing a CHF exacerbation. Please continue a diet low in saturated fat as previously prescribed.   Please take all of your medications as prescribed.  If you have any questions about your medications please feel to call our office at [phone]. Please follow up with Dr coulter in the office in four to five weeks, if you do not receive a call from the office on monday please call [phone] to make an appointment.      - Keep the groin site clean and dry.  You may shower MONDAY and pat the site dry.  - Watch the site for signs of redness or drainage, these should be reported to your doctor immediately.  - You will receive a wallet card about your new valve in the mail.  Please carry it with you to present to anyone who may ask if you have any medical implants.  - Be sure to inform your doctors including your dentist about your valve since you will need to take antibiotics to reduce the risk of infection before certain medical and dental procedures.  - You will be given an appointment to follow up with your doctor in approximately 4 weeks.  It is important to keep this appointment so that your new valve can be assessed.  - You may resume all you normal activities.     Please take all of your medications as prescribed.  If you have any questions about your medications please feel to call our office at [phone].     Please come to ED or Call Cardio thoracic office at [phone] if Chest pain, Shortness of Breath, persistent Nausea & vomiting, oozing from wounds, 2 lb increase in weight in 24 hours. Please follow up with Dr flannery in the office in four to five weeks, if you do not receive a call from the office on monday please call [phone] to make an appointment.      DO NOT CHANGE ANY MEDICATIONS WITHOUT SEEING DR FLANNERY FIRST     - Keep the groin site clean and dry.  You may shower MONDAY and pat the site dry.  - Watch the site for signs of redness or drainage, these should be reported to your doctor immediately.  - You will receive a wallet card about your new valve in the mail.  Please carry it with you to present to anyone who may ask if you have any medical implants.  - Be sure to inform your doctors including your dentist about your valve since you will need to take antibiotics to reduce the risk of infection before certain medical and dental procedures.  - You will be given an appointment to follow up with your doctor in approximately 4 weeks.  It is important to keep this appointment so that your new valve can be assessed.  - You may resume all you normal activities.     Please take all of your medications as prescribed.  If you have any questions about your medications please feel to call our office at [phone].     Please come to ED or Call Cardio thoracic office at [phone] if Chest pain, Shortness of Breath, persistent Nausea & vomiting, oozing from wounds, 2 lb increase in weight in 24 hours. Please continue to take your aspirin, plavix and eliquis (WHICH WILL RESTART 3/11).  Please also continue to follow up with your cardiologist in a month.    Please take all of your medications as prescribed.  If you have any questions about your medications please feel to call our office at [phone]. Please continue a low sodium diet as previously prescribed.    Please take all of your medications as prescribed.  If you have any questions about your medications please feel to call our office at [phone].  WE ARE STOPPING YOUR TOPROL AND WILL DECREASE YOUR CARDIZEM DOSE   DO NOT CHANGE YOUR MEDICATIONS UNTIL SEEN BY DR FLANNERY IN THE OFFICE

## 2019-03-09 NOTE — DISCHARGE NOTE ADULT - ADDITIONAL INSTRUCTIONS
Please follow up with Dr coulter in four to five week.  IF you do not hear from our office on Monday please call [phone] to follow up.    Please see you Cardiologist and your primary care provider in the next month.  Please bring your red folder with you to these appointments.

## 2019-03-09 NOTE — DISCHARGE NOTE ADULT - CARE PLAN
Principal Discharge DX:	Nonrheumatic aortic valve stenosis  Goal:	recover from procedure  Assessment and plan of treatment:	Please follow up with Dr coulter in the office in four to five weeks, if you do not receive a call from the office on monday please call [phone] to make an appointment.      - Keep the groin site clean and dry.  You may shower and pat the site dry.  - Watch the site for signs of redness or drainage, these should be reported to your doctor immediately.  - You will receive a wallet card about your new valve in the mail.  Please carry it with you to present to anyone who may ask if you have any medical implants.  - Be sure to inform your doctors including your dentist about your valve since you will need to take antibiotics to reduce the risk of infection before certain medical and dental procedures.  - You will be given an appointment to follow up with your doctor in approximately 4 weeks.  It is important to keep this appointment so that your new valve can be assessed.  - You may resume all you normal activities.     Please take all of your medications as prescribed.  If you have any questions about your medications please feel to call our office at [phone].     Please come to ED or Call Cardio thoracic office at [phone] if Chest pain, Shortness of Breath, persistent Nausea & vomiting, oozing from wounds, 2 lb increase in weight in 24 hours.  Secondary Diagnosis:	Coronary artery disease involving native coronary artery of native heart without angina pectoris  Assessment and plan of treatment:	Please continue to take your aspirin, plavix and eliquis.  Please also continue to follow up with your cardiologist in a month.    Please take all of your medications as prescribed.  If you have any questions about your medications please feel to call our office at [phone].  Secondary Diagnosis:	Essential hypertension  Assessment and plan of treatment:	Please continue a low sodium diet as previously prescribed.    Please take all of your medications as prescribed.  If you have any questions about your medications please feel to call our office at [phone].  Secondary Diagnosis:	Chronic diastolic heart failure  Assessment and plan of treatment:	You are being discharged with a diagnosis of heart failure. To prevent exacerbation of congestive heart failure (CHF) it is important to follow a heart-healthy, LOW SODIUM diet. You should read all food labels and keep your sodium intake less than 1500 mg per day. Examples of high sodium foods include fast food or processed food (ie frozen TV dinners), chinese food, soup and regular cold cuts. You should also restrict your fluid intake to less than 1-1.5 liters per day. Please weigh yourself every day at the same time in the morning and document your weight in a weight log. Please call your doctor right away if you gain over 2-3 pounds in one day, or you notice an increase in leg swelling, abdominal swelling or shortness of breath. Making sure you take all of your medications as prescribed and maintaining a normal blood pressure are also important for preventing a CHF exacerbation.  Secondary Diagnosis:	Hyperlipidemia, unspecified hyperlipidemia type  Assessment and plan of treatment:	Please continue a diet low in saturated fat as previously prescribed.   Please take all of your medications as prescribed.  If you have any questions about your medications please feel to call our office at [phone]. Principal Discharge DX:	Nonrheumatic aortic valve stenosis  Goal:	recover from procedure  Assessment and plan of treatment:	Please follow up with Dr coulter in the office in four to five weeks, if you do not receive a call from the office on monday please call [phone] to make an appointment.      - Keep the groin site clean and dry.  You may shower MONDAY and pat the site dry.  - Watch the site for signs of redness or drainage, these should be reported to your doctor immediately.  - You will receive a wallet card about your new valve in the mail.  Please carry it with you to present to anyone who may ask if you have any medical implants.  - Be sure to inform your doctors including your dentist about your valve since you will need to take antibiotics to reduce the risk of infection before certain medical and dental procedures.  - You will be given an appointment to follow up with your doctor in approximately 4 weeks.  It is important to keep this appointment so that your new valve can be assessed.  - You may resume all you normal activities.     Please take all of your medications as prescribed.  If you have any questions about your medications please feel to call our office at [phone].     Please come to ED or Call Cardio thoracic office at [phone] if Chest pain, Shortness of Breath, persistent Nausea & vomiting, oozing from wounds, 2 lb increase in weight in 24 hours.  Secondary Diagnosis:	Coronary artery disease involving native coronary artery of native heart without angina pectoris  Assessment and plan of treatment:	Please continue to take your aspirin, plavix and eliquis.  Please also continue to follow up with your cardiologist in a month.    Please take all of your medications as prescribed.  If you have any questions about your medications please feel to call our office at [phone].  Secondary Diagnosis:	Essential hypertension  Assessment and plan of treatment:	Please continue a low sodium diet as previously prescribed.    Please take all of your medications as prescribed.  If you have any questions about your medications please feel to call our office at [phone].  Secondary Diagnosis:	Chronic diastolic heart failure  Assessment and plan of treatment:	You are being discharged with a diagnosis of heart failure. To prevent exacerbation of congestive heart failure (CHF) it is important to follow a heart-healthy, LOW SODIUM diet. You should read all food labels and keep your sodium intake less than 1500 mg per day. Examples of high sodium foods include fast food or processed food (ie frozen TV dinners), chinese food, soup and regular cold cuts. You should also restrict your fluid intake to less than 1-1.5 liters per day. Please weigh yourself every day at the same time in the morning and document your weight in a weight log. Please call your doctor right away if you gain over 2-3 pounds in one day, or you notice an increase in leg swelling, abdominal swelling or shortness of breath. Making sure you take all of your medications as prescribed and maintaining a normal blood pressure are also important for preventing a CHF exacerbation.  Secondary Diagnosis:	Hyperlipidemia, unspecified hyperlipidemia type  Assessment and plan of treatment:	Please continue a diet low in saturated fat as previously prescribed.   Please take all of your medications as prescribed.  If you have any questions about your medications please feel to call our office at [phone]. Principal Discharge DX:	Nonrheumatic aortic valve stenosis  Goal:	recover from procedure  Assessment and plan of treatment:	Please follow up with Dr flannery in the office in four to five weeks, if you do not receive a call from the office on monday please call [phone] to make an appointment.      DO NOT CHANGE ANY MEDICATIONS WITHOUT SEEING DR FLANNERY FIRST     - Keep the groin site clean and dry.  You may shower MONDAY and pat the site dry.  - Watch the site for signs of redness or drainage, these should be reported to your doctor immediately.  - You will receive a wallet card about your new valve in the mail.  Please carry it with you to present to anyone who may ask if you have any medical implants.  - Be sure to inform your doctors including your dentist about your valve since you will need to take antibiotics to reduce the risk of infection before certain medical and dental procedures.  - You will be given an appointment to follow up with your doctor in approximately 4 weeks.  It is important to keep this appointment so that your new valve can be assessed.  - You may resume all you normal activities.     Please take all of your medications as prescribed.  If you have any questions about your medications please feel to call our office at [phone].     Please come to ED or Call Cardio thoracic office at [phone] if Chest pain, Shortness of Breath, persistent Nausea & vomiting, oozing from wounds, 2 lb increase in weight in 24 hours.  Secondary Diagnosis:	Coronary artery disease involving native coronary artery of native heart without angina pectoris  Assessment and plan of treatment:	Please continue to take your aspirin, plavix and eliquis (WHICH WILL RESTART 3/11).  Please also continue to follow up with your cardiologist in a month.    Please take all of your medications as prescribed.  If you have any questions about your medications please feel to call our office at [phone].  Secondary Diagnosis:	Essential hypertension  Assessment and plan of treatment:	Please continue a low sodium diet as previously prescribed.    Please take all of your medications as prescribed.  If you have any questions about your medications please feel to call our office at [phone].  WE ARE STOPPING YOUR TOPROL AND WILL DECREASE YOUR CARDIZEM DOSE   DO NOT CHANGE YOUR MEDICATIONS UNTIL SEEN BY DR FLANNERY IN THE OFFICE  Secondary Diagnosis:	Chronic diastolic heart failure  Assessment and plan of treatment:	You are being discharged with a diagnosis of heart failure. To prevent exacerbation of congestive heart failure (CHF) it is important to follow a heart-healthy, LOW SODIUM diet. You should read all food labels and keep your sodium intake less than 1500 mg per day. Examples of high sodium foods include fast food or processed food (ie frozen TV dinners), chinese food, soup and regular cold cuts. You should also restrict your fluid intake to less than 1-1.5 liters per day. Please weigh yourself every day at the same time in the morning and document your weight in a weight log. Please call your doctor right away if you gain over 2-3 pounds in one day, or you notice an increase in leg swelling, abdominal swelling or shortness of breath. Making sure you take all of your medications as prescribed and maintaining a normal blood pressure are also important for preventing a CHF exacerbation.  Secondary Diagnosis:	Hyperlipidemia, unspecified hyperlipidemia type  Assessment and plan of treatment:	Please continue a diet low in saturated fat as previously prescribed.   Please take all of your medications as prescribed.  If you have any questions about your medications please feel to call our office at [phone].

## 2019-03-09 NOTE — PROGRESS NOTE ADULT - PROBLEM SELECTOR PLAN 1
s/p TAVR   EKG at baseline - afib with rbb pending toporol, cardizem   started on asa and plavix   pending out of bed to chair   currenlty stable on room air   tolerating diet   pending d.c alex, lasix restarted   deline in am   possible d.c home   pending official echo read

## 2019-03-09 NOTE — PROGRESS NOTE ADULT - SUBJECTIVE AND OBJECTIVE BOX
SUBJECTIVE   "I think I am doing okay, i hope the valve will outlive me"   without acute complaints, groins nontender without chest pain   admits appetite, and flatus without bowel movement   pending out of bed to chair     INTERIM HISTORY SIGNIFICANT FOR   s/p TAVR 23 mm ladonna valve came out extubated   without change in ECG post valve   sheaths d.c via left groin   groin benign     restarted on asa and plavix, eliquis pending   pending cardizem/ metoporol     Patient is a 83y old  Female who presents with a chief complaint of [place]readmit for TAVR (07 Mar 2019 13:10)    HPI:  This is an 82 yo female with Afib newly diagnosed presented to [facility] for TAVR in near future. PMHx includes HLD, calcific aortic stenosis  and scoliosis.  Patient had recent DANIA/CV, was unsuccessful and remains in Afib with RVR. Denies chest pain, sob, palps.    Echocardiogram 2018:  LV size and function normal EF 65%, moderate mitral calcification with mild to moderate MR, severe calcific aortic stenosis, peak gradient  35 and mean gradient of 8with estimated valve area 0.9.    Cardiac Catheterization 12/6/2018:  Low dose diuretic with elevated filling pressures, AF with rapid VR, low gradient, severe RCA and moderate LAD stenosis, preserved EF. (26 Feb 2019 14:23)    OBJECTIVE  PAST MEDICAL & SURGICAL HISTORY:  Atrial fibrillation  CAD (coronary artery disease)  Kyphoscoliosis  Aortic stenosis  SCC (squamous cell carcinoma)  Varicose vein of leg  Mitral valve disease  Hypertension  History of PTCA: one stent  After-cataract of both eyes  H/O colonoscopy  H/O varicose vein stripping    No Known Allergies    Home Medications:  Cardizem  mg/24 hours oral capsule, extended release: 1 cap(s) orally once a day (26 Feb 2019 14:43)  Eliquis 2.5 mg oral tablet: 1 tab(s) orally 2 times a day (26 Feb 2019 14:43)  furosemide 40 mg oral tablet: 1 tab(s) orally once a day (26 Feb 2019 14:43)  Metoprolol Succinate ER 50 mg oral tablet, extended release: 1 tab(s) orally once a day (26 Feb 2019 14:43)  spironolactone 25 mg oral tablet: 1 tab(s) orally once a day (26 Feb 2019 14:43)    VITALS  Currently in sinus rhythm with vitals as below  ICU Vital Signs Last 24 Hrs  T(C): 36.6 (08 Mar 2019 18:37), Max: 36.9 (08 Mar 2019 05:50)  T(F): 97.8 (08 Mar 2019 18:37), Max: 98.4 (08 Mar 2019 05:50)  HR: 87 (09 Mar 2019 00:00) (77 - 108)  BP: 120/90 (08 Mar 2019 09:02) (112/- - 120/90)  BP(mean): --  ABP: 103/54 (09 Mar 2019 00:00) (93/51 - 127/64)  ABP(mean): 72 (09 Mar 2019 00:00) (64 - 90)  RR: 21 (09 Mar 2019 00:00) (12 - 34)  SpO2: 94% (09 Mar 2019 00:00) (92% - 100%)        LABS                        12.5   8.0   )-----------( 187      ( 08 Mar 2019 13:07 )             37.8   PT/INR - ( 08 Mar 2019 13:07 )   PT: 16.7 sec;   INR: 1.44 ratio         PTT - ( 08 Mar 2019 13:07 )  PTT:27.3 [id number]    133<L>  |  98  |  21.0<H>  ----------------------------<  130<H>  4.2   |  23.0  |  0.55    Ca    8.6      08 Mar 2019 13:07  Mg     1.9     03-08    TPro  6.5<L>  /  Alb  3.5  /  TBili  0.6  /  DBili  x   /  AST  31  /  ALT  28  /  AlkPhos  94  03-08  CAPILLARY BLOOD GLUCOSE      CARDIAC MARKERS ( 08 Mar 2019 13:07 )  x     / 0.08 ng/mL / 135 U/L / x     / x          ABG - ( 08 Mar 2019 12:59 )  pH, Arterial: 7.41  pH, Blood: x     /  pCO2: 44    /  pO2: 148   / HCO3: 26    / Base Excess: 2.3   /  SaO2: 100                 IN/OUT    03-07-19 @ 07:01  -  03-08-19 @ 07:00  --------------------------------------------------------  IN: 128 mL / OUT: 650 mL / NET: -522 mL    03-08-19 @ 07:01  -  03-09-19 @ 01:21  --------------------------------------------------------  IN: 320 mL / OUT: 1205 mL / NET: -885 mL      IMAGING  personally reviewed imaging   Xray Chest 1 View- PORTABLE-Urgent:    EXAM:  XR CHEST PORTABLE URGENT 1V                          PROCEDURE DATE:  03/08/2019          INTERPRETATION:  AP chest on March 8, 2019 at 7:36 AM. Patient is preop.   Patient has aortic valve disease.    Heart is enlarged.    There is mild bandlike retrocardiac atelectasis.    Chest is similar to earlier study of March 8.    IMPRESSION: Heart enlargement and mild bandlike retrocardiac atelectasis   again noted.                CORAZON KLINE M.D., ATTENDING RADIOLOGIST  This document has beenelectronically signed. Mar  8 2019  9:40AM               (03-08-19 @ 07:40)    CURRENT MEDICATIONS  MEDICATIONS  (STANDING):  aspirin enteric coated 81 milliGRAM(s) Oral daily  cefuroxime  IVPB 1500 milliGRAM(s) IV Intermittent every 8 hours  clopidogrel Tablet 75 milliGRAM(s) Oral daily  docusate sodium 100 milliGRAM(s) Oral three times a day  furosemide    Tablet 40 milliGRAM(s) Oral daily  pantoprazole    Tablet 40 milliGRAM(s) Oral before breakfast  sodium chloride 0.9%. 1000 milliLiter(s) (10 mL/Hr) IV Continuous <Continuous>  spironolactone 25 milliGRAM(s) Oral daily    MEDICATIONS  (PRN):

## 2019-03-12 ENCOUNTER — APPOINTMENT (OUTPATIENT)
Dept: CARDIOTHORACIC SURGERY | Facility: CLINIC | Age: 84
End: 2019-03-12
Payer: MEDICARE

## 2019-03-12 ENCOUNTER — NON-APPOINTMENT (OUTPATIENT)
Age: 84
End: 2019-03-12

## 2019-03-12 PROCEDURE — 99213 OFFICE O/P EST LOW 20 MIN: CPT

## 2019-03-18 ENCOUNTER — APPOINTMENT (OUTPATIENT)
Age: 84
End: 2019-03-18

## 2019-03-18 VITALS
OXYGEN SATURATION: 97 % | RESPIRATION RATE: 16 BRPM | DIASTOLIC BLOOD PRESSURE: 74 MMHG | HEART RATE: 148 BPM | SYSTOLIC BLOOD PRESSURE: 110 MMHG

## 2019-03-18 RX ORDER — METOPROLOL SUCCINATE 50 MG/1
50 TABLET, EXTENDED RELEASE ORAL DAILY
Qty: 90 | Refills: 1 | Status: DISCONTINUED | COMMUNITY
Start: 2018-12-15 | End: 2019-03-18

## 2019-03-18 NOTE — HISTORY OF PRESENT ILLNESS
[FreeTextEntry1] : This is an 84 yo female s/p TAVR with Dr. Hernández and doing well.  She reports that she has been feeling fine and offers no complaints and reports that she has seen Dr. Hernández a few days ago.  Denies dizziness, SOB, palpitatins, weakness.  She states that her son Kei is involved in her care, but he can not be reached since he is working as a [place] .  Refused to provide contact information.  She reported that [facility] in place.

## 2019-03-18 NOTE — PHYSICAL EXAM
[Respiration, Rhythm And Depth] : normal respiratory rhythm and effort [Auscultation Breath Sounds / Voice Sounds] : lungs were clear to auscultation bilaterally [Murmurs] : no murmurs [Tachycardic ___] : the heart rate was tachycardic at [unfilled] bpm [Heart Sounds Pericardial Friction Rub] : no pericardial rub [Regularly Irregular] : the rhythm was regularly irregular [Examination Of The Chest] : the chest was normal in appearance [Chest Visual Inspection Thoracic Asymmetry] : no chest asymmetry [Diminished Respiratory Excursion] : normal chest expansion [Bowel Sounds] : normal bowel sounds [Abdomen Soft] : soft [Abdomen Tenderness] : non-tender [Abdomen Mass (___ Cm)] : no abdominal mass palpated [Abnormal Walk] : normal gait [Nail Clubbing] : no clubbing  or cyanosis of the fingernails [Motor Tone] : muscle strength and tone were normal [Musculoskeletal - Swelling] : no joint swelling seen [Skin Color & Pigmentation] : normal skin color and pigmentation [Skin Turgor] : normal skin turgor [] : no rash [Deep Tendon Reflexes (DTR)] : deep tendon reflexes were 2+ and symmetric [Sensation] : the sensory exam was normal to light touch and pinprick [Motor Exam] : the motor exam was normal [Oriented To Time, Place, And Person] : oriented to person, place, and time [Impaired Insight] : insight and judgment were intact [FreeTextEntry1] : Provided accurate teach back method with change in medications.

## 2019-03-26 ENCOUNTER — APPOINTMENT (OUTPATIENT)
Dept: INTERNAL MEDICINE | Facility: CLINIC | Age: 84
End: 2019-03-26
Payer: MEDICARE

## 2019-03-26 VITALS
HEART RATE: 110 BPM | WEIGHT: 115 LBS | HEIGHT: 63 IN | SYSTOLIC BLOOD PRESSURE: 115 MMHG | OXYGEN SATURATION: 97 % | RESPIRATION RATE: 16 BRPM | BODY MASS INDEX: 20.38 KG/M2 | DIASTOLIC BLOOD PRESSURE: 70 MMHG

## 2019-03-26 DIAGNOSIS — Z79.01 LONG TERM (CURRENT) USE OF ANTICOAGULANTS: ICD-10-CM

## 2019-03-26 PROCEDURE — 99214 OFFICE O/P EST MOD 30 MIN: CPT | Mod: 25

## 2019-03-26 PROCEDURE — 36415 COLL VENOUS BLD VENIPUNCTURE: CPT

## 2019-03-26 RX ORDER — SPIRONOLACTONE 25 MG/1
25 TABLET ORAL DAILY
Refills: 1 | Status: ACTIVE | COMMUNITY
Start: 2019-01-28

## 2019-03-26 RX ORDER — HYDROCORTISONE 25 MG/G
2.5 OINTMENT TOPICAL
Qty: 454 | Refills: 0 | Status: DISCONTINUED | COMMUNITY
Start: 2018-10-22

## 2019-03-26 RX ORDER — METOPROLOL SUCCINATE 25 MG/1
25 TABLET, EXTENDED RELEASE ORAL
Qty: 90 | Refills: 0 | Status: DISCONTINUED | COMMUNITY
Start: 2018-11-28

## 2019-03-26 RX ORDER — APIXABAN 2.5 MG/1
2.5 TABLET, FILM COATED ORAL
Qty: 180 | Refills: 0 | Status: ACTIVE | COMMUNITY
Start: 2018-12-15

## 2019-03-26 RX ORDER — KETOCONAZOLE 20 MG/G
2 CREAM TOPICAL
Qty: 60 | Refills: 0 | Status: DISCONTINUED | COMMUNITY
Start: 2019-02-12

## 2019-03-26 RX ORDER — DILTIAZEM HYDROCHLORIDE 120 MG/1
120 CAPSULE, EXTENDED RELEASE ORAL
Qty: 30 | Refills: 0 | Status: DISCONTINUED | COMMUNITY
Start: 2019-03-09

## 2019-03-26 RX ORDER — NITROFURANTOIN (MONOHYDRATE/MACROCRYSTALS) 25; 75 MG/1; MG/1
100 CAPSULE ORAL
Qty: 10 | Refills: 0 | Status: DISCONTINUED | COMMUNITY
Start: 2019-02-28

## 2019-03-26 NOTE — PHYSICAL EXAM
[No Acute Distress] : no acute distress [No Respiratory Distress] : no respiratory distress  [Clear to Auscultation] : lungs were clear to auscultation bilaterally [Normal Rate] : normal rate  [No Edema] : there was no peripheral edema [No Focal Deficits] : no focal deficits [Normal Affect] : the affect was normal [Well-Appearing] : well-appearing [de-identified] : irregularly irregular

## 2019-03-26 NOTE — ASSESSMENT
[FreeTextEntry1] : Patient with chronic nature for relation still with increased heart rate with recent increase in Cardizem.\par Patient will call to monitor on and will followup with cardiology.\par \par Continue present medications\par \par Blood work done to assess metabolic status\par \par Followup as scheduled in May

## 2019-03-26 NOTE — HISTORY OF PRESENT ILLNESS
[Family Member] : family member [FreeTextEntry1] : Followup multiple recent cardiac issues [de-identified] : 83-year-old female who was seen for her routine yearly physical November 2018 when she was found to be in rapid atrial fibrillation.\par The patient followed up with cardiology afterwards and was treated with medication.\par An attempt at electrical cardioversion was unsuccessful. Medications will continue.\par \par Patient also had underlying severe aortic stenosis.\par \par She had a cardiac catheterization January 10, 2019 with severe right coronary artery stenosis with stenting and moderate LAD disease and mild left main and circumflex disease.\par She tolerated this well.\par \par Afterward the patient was evaluated for a TAVR which was accomplished on March 8, 2019 without any complications.\par \par The patient was evaluated in cardiothoracic on March 18 and was found to have a rapid atrial fibrillation and Cardizem CD was increased.\par She hasn't seen cardiology and she has a Holter monitor on.\par \par Overall she is feeling fairly well without any chest pain, palpitations, shortness of breath or edema.\par She has significant edema during these episodes and now is much better.\par \par Medications were reviewed with the patient and her son and she is taking everything as she should.

## 2019-03-27 ENCOUNTER — RESULT REVIEW (OUTPATIENT)
Age: 84
End: 2019-03-27

## 2019-03-27 LAB
ALBUMIN SERPL ELPH-MCNC: 4.1 G/DL
ALP BLD-CCNC: 94 U/L
ALT SERPL-CCNC: 24 U/L
ANION GAP SERPL CALC-SCNC: 10 MMOL/L
AST SERPL-CCNC: 35 U/L
BASOPHILS # BLD AUTO: 0.05 K/UL
BASOPHILS NFR BLD AUTO: 0.9 %
BILIRUB SERPL-MCNC: 0.4 MG/DL
BUN SERPL-MCNC: 31 MG/DL
CALCIUM SERPL-MCNC: 10.1 MG/DL
CHLORIDE SERPL-SCNC: 102 MMOL/L
CO2 SERPL-SCNC: 26 MMOL/L
CREAT SERPL-MCNC: 0.84 MG/DL
EOSINOPHIL # BLD AUTO: 0 K/UL
EOSINOPHIL NFR BLD AUTO: 0 %
GLUCOSE SERPL-MCNC: 96 MG/DL
HCT VFR BLD CALC: 42.1 %
HGB BLD-MCNC: 13.1 G/DL
IMM GRANULOCYTES NFR BLD AUTO: 0.2 %
LYMPHOCYTES # BLD AUTO: 1.85 K/UL
LYMPHOCYTES NFR BLD AUTO: 32.4 %
MAGNESIUM SERPL-MCNC: 2.3 MG/DL
MAN DIFF?: NORMAL
MCHC RBC-ENTMCNC: 28.3 PG
MCHC RBC-ENTMCNC: 31.1 GM/DL
MCV RBC AUTO: 90.9 FL
MONOCYTES # BLD AUTO: 0.61 K/UL
MONOCYTES NFR BLD AUTO: 10.7 %
NEUTROPHILS # BLD AUTO: 3.19 K/UL
NEUTROPHILS NFR BLD AUTO: 55.8 %
PLATELET # BLD AUTO: 212 K/UL
POTASSIUM SERPL-SCNC: 4.8 MMOL/L
PROT SERPL-MCNC: 7 G/DL
RBC # BLD: 4.63 M/UL
RBC # FLD: 17 %
SODIUM SERPL-SCNC: 138 MMOL/L
WBC # FLD AUTO: 5.71 K/UL

## 2019-04-04 PROBLEM — I87.2 SAPHENOFEMORAL VENOUS REFLUX: Status: ACTIVE | Noted: 2019-04-04

## 2019-04-04 PROBLEM — R60.0 BILATERAL LEG EDEMA: Status: ACTIVE | Noted: 2019-01-28

## 2019-04-04 NOTE — HISTORY OF PRESENT ILLNESS
[FreeTextEntry1] : 82 yo F with history of HTN, aortic stenosis s/p TAVR, tricuspid regurgitation, CHF, Raynaud's, anemia, CAD, a. fib on Eliquis, DDD, presenting for evaluation of bilateral LE edema and enlarged varicose veins. Pt's edema present for years, and has been getting worse. She experiences dyspnea on exertion. Denies chest pain, SOB. No history of DVT, SVT. She takes a daily diuretic without much improvement.

## 2019-04-04 NOTE — REVIEW OF SYSTEMS
[Leg Claudication] : no intermittent leg claudication [Lower Ext Edema] : lower extremity edema [SOB on Exertion] : shortness of breath during exertion [Limb Swelling] : limb swelling [Negative] : Heme/Lymph

## 2019-04-04 NOTE — ASSESSMENT
[FreeTextEntry1] : 82 yo F with history of HTN, aortic stenosis s/p TAVR, tricuspid regurgitation, CHF, Raynaud's, anemia, CAD, a. fib on Eliquis, DDD, presenting for evaluation of bilateral LE edema and enlarged varicose veins. \par \par Venous duplex +deep vein insufficiency bilaterally; +SSV reflux bilaterally; multiple tributaries bilaterally, pulsatile venous flow bilaterally at all levels [Arterial/Venous Disease] : arterial/venous disease

## 2019-04-23 ENCOUNTER — APPOINTMENT (OUTPATIENT)
Dept: INTERNAL MEDICINE | Facility: CLINIC | Age: 84
End: 2019-04-23
Payer: MEDICARE

## 2019-04-23 VITALS
BODY MASS INDEX: 20.02 KG/M2 | SYSTOLIC BLOOD PRESSURE: 115 MMHG | DIASTOLIC BLOOD PRESSURE: 70 MMHG | WEIGHT: 113 LBS | RESPIRATION RATE: 16 BRPM | HEIGHT: 63 IN | HEART RATE: 88 BPM | OXYGEN SATURATION: 95 %

## 2019-04-23 DIAGNOSIS — R05 COUGH: ICD-10-CM

## 2019-04-23 DIAGNOSIS — I48.2 CHRONIC ATRIAL FIBRILLATION: ICD-10-CM

## 2019-04-23 DIAGNOSIS — R09.82 POSTNASAL DRIP: ICD-10-CM

## 2019-04-23 PROCEDURE — 99213 OFFICE O/P EST LOW 20 MIN: CPT

## 2019-04-23 RX ORDER — DILTIAZEM HYDROCHLORIDE 240 MG/1
240 CAPSULE, EXTENDED RELEASE ORAL
Refills: 1 | Status: DISCONTINUED | COMMUNITY
End: 2019-04-23

## 2019-04-23 NOTE — ASSESSMENT
[FreeTextEntry1] : Reassured patient that symptoms likely unrelated to her heart and will likely postnasal drip possibly from allergens.\par Therefore recommend Flonase nasal spray 2 sprays bilaterally daily.\par Patient told to call me if symptoms worsen or change.

## 2019-04-23 NOTE — HISTORY OF PRESENT ILLNESS
[Family Member] : family member [FreeTextEntry8] : 83-year-old female who is status post TAVR March 2019 and LAD stent January 2019 and with chronic atrial for relation presents with her daughter secondary to a few days of needing to cough with mucus and questionable postnasal drip.\par The patient denies any sinus pain, sore throat, earaches, fever or chills.\par She is concerned it may be related to her heart.\par She has not tried any over-the-counter remedies.\par \par The patient's A. fib continues uncontrolled and she saw cardiology today with change from diltiazem to a higher dose metoprolol

## 2019-04-23 NOTE — PHYSICAL EXAM
[No Acute Distress] : no acute distress [Well-Appearing] : well-appearing [No Respiratory Distress] : no respiratory distress  [Clear to Auscultation] : lungs were clear to auscultation bilaterally [No Focal Deficits] : no focal deficits [Normal Affect] : the affect was normal [Normal Outer Ear/Nose] : the outer ears and nose were normal in appearance [Normal Sclera/Conjunctiva] : normal sclera/conjunctiva [Normal TMs] : both tympanic membranes were normal [No Edema] : there was no peripheral edema [de-identified] : Tachycardia, irregularly irregular [de-identified] : Slight postnasal drip

## 2019-04-24 ENCOUNTER — APPOINTMENT (OUTPATIENT)
Dept: DERMATOLOGY | Facility: CLINIC | Age: 84
End: 2019-04-24
Payer: MEDICARE

## 2019-04-24 ENCOUNTER — RESULT REVIEW (OUTPATIENT)
Age: 84
End: 2019-04-24

## 2019-04-24 PROCEDURE — 99213 OFFICE O/P EST LOW 20 MIN: CPT | Mod: 25

## 2019-04-24 PROCEDURE — 17261 DSTRJ MAL LES T/A/L .6-1.0CM: CPT

## 2019-04-26 RX ORDER — FUROSEMIDE 40 MG/1
40 TABLET ORAL
Qty: 180 | Refills: 1 | Status: ACTIVE | COMMUNITY
Start: 2019-01-28

## 2019-04-26 RX ORDER — ASPIRIN ENTERIC COATED TABLETS 81 MG 81 MG/1
81 TABLET, DELAYED RELEASE ORAL
Refills: 0 | Status: DISCONTINUED | COMMUNITY
Start: 2019-02-22 | End: 2019-04-26

## 2019-04-26 RX ORDER — METOPROLOL TARTRATE 100 MG/1
100 TABLET, FILM COATED ORAL
Refills: 1 | Status: DISCONTINUED | COMMUNITY
Start: 2019-04-23 | End: 2019-04-26

## 2019-05-01 ENCOUNTER — APPOINTMENT (OUTPATIENT)
Dept: DERMATOLOGY | Facility: CLINIC | Age: 84
End: 2019-05-01
Payer: MEDICARE

## 2019-05-01 ENCOUNTER — APPOINTMENT (OUTPATIENT)
Dept: INTERNAL MEDICINE | Facility: CLINIC | Age: 84
End: 2019-05-01
Payer: MEDICARE

## 2019-05-01 VITALS
WEIGHT: 116 LBS | BODY MASS INDEX: 20.55 KG/M2 | OXYGEN SATURATION: 95 % | DIASTOLIC BLOOD PRESSURE: 70 MMHG | HEART RATE: 88 BPM | HEIGHT: 63 IN | SYSTOLIC BLOOD PRESSURE: 110 MMHG

## 2019-05-01 DIAGNOSIS — E04.1 NONTOXIC SINGLE THYROID NODULE: ICD-10-CM

## 2019-05-01 DIAGNOSIS — L98.9 DISORDER OF THE SKIN AND SUBCUTANEOUS TISSUE, UNSPECIFIED: ICD-10-CM

## 2019-05-01 PROCEDURE — 99214 OFFICE O/P EST MOD 30 MIN: CPT | Mod: 25

## 2019-05-01 PROCEDURE — 99214 OFFICE O/P EST MOD 30 MIN: CPT | Mod: 24

## 2019-05-01 PROCEDURE — 36415 COLL VENOUS BLD VENIPUNCTURE: CPT

## 2019-05-01 RX ORDER — PANTOPRAZOLE 40 MG/1
40 TABLET, DELAYED RELEASE ORAL
Qty: 14 | Refills: 0 | Status: DISCONTINUED | COMMUNITY
Start: 2019-03-09 | End: 2019-05-01

## 2019-05-01 RX ORDER — METOPROLOL SUCCINATE 100 MG/1
100 TABLET, EXTENDED RELEASE ORAL
Refills: 0 | Status: ACTIVE | COMMUNITY
Start: 2019-04-26

## 2019-05-02 ENCOUNTER — RESULT REVIEW (OUTPATIENT)
Age: 84
End: 2019-05-02

## 2019-05-02 LAB
ALBUMIN SERPL ELPH-MCNC: 4 G/DL
ALP BLD-CCNC: 90 U/L
ALT SERPL-CCNC: 39 U/L
ANION GAP SERPL CALC-SCNC: 13 MMOL/L
AST SERPL-CCNC: 44 U/L
BASOPHILS # BLD AUTO: 0.06 K/UL
BASOPHILS NFR BLD AUTO: 1 %
BILIRUB SERPL-MCNC: 0.4 MG/DL
BUN SERPL-MCNC: 42 MG/DL
CALCIUM SERPL-MCNC: 9.6 MG/DL
CHLORIDE SERPL-SCNC: 101 MMOL/L
CHOLEST SERPL-MCNC: 163 MG/DL
CHOLEST/HDLC SERPL: 3.5 RATIO
CO2 SERPL-SCNC: 28 MMOL/L
CREAT SERPL-MCNC: 0.82 MG/DL
EOSINOPHIL # BLD AUTO: 0.01 K/UL
EOSINOPHIL NFR BLD AUTO: 0.2 %
GLUCOSE SERPL-MCNC: 86 MG/DL
HCT VFR BLD CALC: 41.8 %
HDLC SERPL-MCNC: 47 MG/DL
HGB BLD-MCNC: 12.4 G/DL
IMM GRANULOCYTES NFR BLD AUTO: 0.2 %
LDLC SERPL CALC-MCNC: 95 MG/DL
LYMPHOCYTES # BLD AUTO: 1.67 K/UL
LYMPHOCYTES NFR BLD AUTO: 27.5 %
MAGNESIUM SERPL-MCNC: 2.3 MG/DL
MAN DIFF?: NORMAL
MCHC RBC-ENTMCNC: 27.7 PG
MCHC RBC-ENTMCNC: 29.7 GM/DL
MCV RBC AUTO: 93.5 FL
MONOCYTES # BLD AUTO: 0.77 K/UL
MONOCYTES NFR BLD AUTO: 12.7 %
NEUTROPHILS # BLD AUTO: 3.55 K/UL
NEUTROPHILS NFR BLD AUTO: 58.4 %
PLATELET # BLD AUTO: 204 K/UL
POTASSIUM SERPL-SCNC: 4.3 MMOL/L
PROT SERPL-MCNC: 6.4 G/DL
RBC # BLD: 4.47 M/UL
RBC # FLD: 18.2 %
SODIUM SERPL-SCNC: 142 MMOL/L
TRIGL SERPL-MCNC: 107 MG/DL
TSH SERPL-ACNC: 3.59 UIU/ML
WBC # FLD AUTO: 6.07 K/UL

## 2019-05-02 NOTE — PHYSICAL EXAM
[] : no respiratory distress [General Appearance - In No Acute Distress] : in no acute distress [Respiration, Rhythm And Depth] : normal respiratory rhythm and effort [Auscultation Breath Sounds / Voice Sounds] : lungs were clear to auscultation bilaterally [Affect] : the affect was normal [Mood] : the mood was normal [de-identified] : +Quarter-sized biopsy site noted right lateral lower leg with surrounding erythema and no warmth. Slightly lower from that 2 irregular skin lesions that are tender [FreeTextEntry1] : +murmur, +irreg with CVR

## 2019-05-02 NOTE — ASSESSMENT
[FreeTextEntry1] : Labs will be sent out and  further recommendations will be made based on lab results.Dermatology to see patient today. Patient advised to continue present medications with diet/exercise and specialist followup.RTO in 3-4months \par \par Dr. Patterson was present in office building while I examined pt\par \par \par Shingrix vaccine d/w pt\par Last bone density was January of 2015-"to sched follow up soon"-rx given\par Last colonoscopy was 09, no further colonoscopies needed\par Declines gynecology exam\par  specialists  include\par 1.  endocrinology q. year-Dr. Song-NO LONGER GOES\par 2. dermatology-Dr. Cope\par 3.podiatry-\par 4. ophthalmology.-Dr. Naranjo\par 5.cardiology-Dr. Alexis\par 6.plastic surgeon-Dr. Borjas-NO NEED FOR FOLLOW UP\par 7. Surgical oncologist-Dr. Lucia-NO need for follow up\par 8. Hand specialist-Dr. Karolina colon\par 9.ortho-Dr. De Leon\par 10.Vascular for edema-Dr. Chavis\par 11.Cardiothoracic-Dr. Jaimes\par thyroid sonogram-"to do follow up soon"-Rx given\par mammogram was 12/15-"to do follow up soon"-Rx given\par echo 3/2019\par Declines rheumatology evaluation to investigate + LAVINIA\par Declines HIV screening/has been tested for hepatitis C in the past

## 2019-05-16 ENCOUNTER — APPOINTMENT (OUTPATIENT)
Dept: DERMATOLOGY | Facility: CLINIC | Age: 84
End: 2019-05-16
Payer: MEDICARE

## 2019-05-16 ENCOUNTER — MESSAGE (OUTPATIENT)
Age: 84
End: 2019-05-16

## 2019-05-16 DIAGNOSIS — C44.722 SQUAMOUS CELL CARCINOMA OF SKIN OF RIGHT LOWER LIMB, INCLUDING HIP: ICD-10-CM

## 2019-05-16 PROCEDURE — 17315 MOHS SURG ADDL BLOCK: CPT

## 2019-05-16 PROCEDURE — ZZZZZ: CPT

## 2019-05-16 PROCEDURE — 17311 MOHS 1 STAGE H/N/HF/G: CPT

## 2019-05-17 ENCOUNTER — APPOINTMENT (OUTPATIENT)
Dept: DERMATOLOGY | Facility: CLINIC | Age: 84
End: 2019-05-17
Payer: MEDICARE

## 2019-05-17 PROCEDURE — 99212 OFFICE O/P EST SF 10 MIN: CPT

## 2019-05-20 ENCOUNTER — APPOINTMENT (OUTPATIENT)
Dept: DERMATOLOGY | Facility: CLINIC | Age: 84
End: 2019-05-20
Payer: MEDICARE

## 2019-05-20 ENCOUNTER — APPOINTMENT (OUTPATIENT)
Dept: DERMATOLOGY | Facility: CLINIC | Age: 84
End: 2019-05-20

## 2019-05-20 PROCEDURE — 99212 OFFICE O/P EST SF 10 MIN: CPT | Mod: 24

## 2019-05-22 ENCOUNTER — APPOINTMENT (OUTPATIENT)
Dept: DERMATOLOGY | Facility: CLINIC | Age: 84
End: 2019-05-22
Payer: MEDICARE

## 2019-05-22 PROCEDURE — ZZZZZ: CPT

## 2019-05-23 ENCOUNTER — APPOINTMENT (OUTPATIENT)
Dept: DERMATOLOGY | Facility: CLINIC | Age: 84
End: 2019-05-23
Payer: MEDICARE

## 2019-05-23 PROCEDURE — 99213 OFFICE O/P EST LOW 20 MIN: CPT

## 2019-05-28 ENCOUNTER — APPOINTMENT (OUTPATIENT)
Dept: DERMATOLOGY | Facility: CLINIC | Age: 84
End: 2019-05-28
Payer: MEDICARE

## 2019-05-28 PROCEDURE — 99212 OFFICE O/P EST SF 10 MIN: CPT | Mod: 25

## 2019-05-30 ENCOUNTER — APPOINTMENT (OUTPATIENT)
Dept: DERMATOLOGY | Facility: CLINIC | Age: 84
End: 2019-05-30
Payer: MEDICARE

## 2019-05-30 PROCEDURE — 99212 OFFICE O/P EST SF 10 MIN: CPT | Mod: 24

## 2019-06-03 ENCOUNTER — APPOINTMENT (OUTPATIENT)
Dept: DERMATOLOGY | Facility: CLINIC | Age: 84
End: 2019-06-03
Payer: MEDICARE

## 2019-06-03 PROCEDURE — 99212 OFFICE O/P EST SF 10 MIN: CPT

## 2019-06-06 ENCOUNTER — APPOINTMENT (OUTPATIENT)
Dept: DERMATOLOGY | Facility: CLINIC | Age: 84
End: 2019-06-06
Payer: MEDICARE

## 2019-06-06 PROCEDURE — 99213 OFFICE O/P EST LOW 20 MIN: CPT

## 2019-06-27 ENCOUNTER — APPOINTMENT (OUTPATIENT)
Dept: DERMATOLOGY | Facility: CLINIC | Age: 84
End: 2019-06-27
Payer: MEDICARE

## 2019-06-27 PROCEDURE — 99212 OFFICE O/P EST SF 10 MIN: CPT

## 2019-07-18 ENCOUNTER — APPOINTMENT (OUTPATIENT)
Dept: DERMATOLOGY | Facility: CLINIC | Age: 84
End: 2019-07-18

## 2019-07-31 ENCOUNTER — EMERGENCY (EMERGENCY)
Facility: HOSPITAL | Age: 84
LOS: 1 days | Discharge: HOME | End: 2019-07-31
Attending: EMERGENCY MEDICINE
Payer: MEDICARE

## 2019-07-31 VITALS
WEIGHT: 164.91 LBS | DIASTOLIC BLOOD PRESSURE: 73 MMHG | TEMPERATURE: 98 F | OXYGEN SATURATION: 97 % | HEIGHT: 67 IN | RESPIRATION RATE: 20 BRPM | SYSTOLIC BLOOD PRESSURE: 128 MMHG | HEART RATE: 63 BPM

## 2019-07-31 DIAGNOSIS — H26.40 UNSPECIFIED SECONDARY CATARACT: Chronic | ICD-10-CM

## 2019-07-31 DIAGNOSIS — Z98.890 OTHER SPECIFIED POSTPROCEDURAL STATES: Chronic | ICD-10-CM

## 2019-07-31 DIAGNOSIS — Z98.61 CORONARY ANGIOPLASTY STATUS: Chronic | ICD-10-CM

## 2019-07-31 PROCEDURE — 99282 EMERGENCY DEPT VISIT SF MDM: CPT

## 2019-07-31 NOTE — ED ADULT TRIAGE NOTE - CHIEF COMPLAINT QUOTE
isaura mitul  Reviewed ct scan, she will schedule w/ gen surg to remove Patient brought in by ambulance A/Ox3, reports she has a wound on right arm, on Eliquis, bleeding controlled.

## 2019-07-31 NOTE — ED PROVIDER NOTE - ATTENDING CONTRIBUTION TO CARE
I performed the initial face to face bedside interview with this patient regarding history of present illness, review of symptoms and past medical, social and family history.  I completed an independent physical examination.  I was the initial provider who evaluated this patient.  The history, review of symptoms and examination was documented by the scribe in my presence and I attest to the accuracy of the documentation.  I have signed out the follow up of any pending tests (i.e. labs, radiological studies) to the ACP.  I have discussed the patient’s plan of care and disposition with the ACP.

## 2019-07-31 NOTE — ED PROVIDER NOTE - PHYSICAL EXAMINATION
General:     NAD, well-nourished, well-appearing  Ext:   R FOREARM:  pinpoint area of bleeding @ mid dorsal forearm. no warmth or tenderness. from.   Neuro: grossly intact

## 2019-07-31 NOTE — ED PROVIDER NOTE - NS ED ROS FT
Constitutional: (-) fever  Cardiovascular: (-) syncope  Integumentary: +skin tear  Neurological: (-) altered mental status

## 2019-07-31 NOTE — ED PROVIDER NOTE - OBJECTIVE STATEMENT
84yoF; with pmh signif for Afib (on Eliquis), CAD, AS; now p/w right forearm skin tear with bleeding.  [facility].  denies numbness/tingling. denies focal weakness. 84yoF; with pmh signif for Afib (on Eliquis), CAD, AS; now p/w right forearm skin tear with bleeding.  sent in by senior center for evaluation.  denies numbness/tingling. denies focal weakness. .

## 2019-08-08 ENCOUNTER — APPOINTMENT (OUTPATIENT)
Dept: DERMATOLOGY | Facility: CLINIC | Age: 84
End: 2019-08-08

## 2019-08-15 ENCOUNTER — APPOINTMENT (OUTPATIENT)
Dept: DERMATOLOGY | Facility: CLINIC | Age: 84
End: 2019-08-15
Payer: MEDICARE

## 2019-08-15 ENCOUNTER — RESULT REVIEW (OUTPATIENT)
Age: 84
End: 2019-08-15

## 2019-08-15 PROCEDURE — 11102 TANGNTL BX SKIN SINGLE LES: CPT

## 2019-08-15 PROCEDURE — 99213 OFFICE O/P EST LOW 20 MIN: CPT | Mod: 25

## 2019-08-23 NOTE — DISCHARGE NOTE ADULT - REASON FOR ADMISSION

## 2019-08-29 ENCOUNTER — APPOINTMENT (OUTPATIENT)
Dept: DERMATOLOGY | Facility: CLINIC | Age: 84
End: 2019-08-29
Payer: MEDICARE

## 2019-08-29 DIAGNOSIS — C44.729 SQUAMOUS CELL CARCINOMA OF SKIN OF LEFT LOWER LIMB, INCLUDING HIP: ICD-10-CM

## 2019-08-29 DIAGNOSIS — L57.0 ACTINIC KERATOSIS: ICD-10-CM

## 2019-08-29 PROCEDURE — 17000 DESTRUCT PREMALG LESION: CPT

## 2019-08-29 PROCEDURE — 17003 DESTRUCT PREMALG LES 2-14: CPT

## 2019-09-03 ENCOUNTER — APPOINTMENT (OUTPATIENT)
Dept: DERMATOLOGY | Facility: CLINIC | Age: 84
End: 2019-09-03

## 2019-10-11 DIAGNOSIS — Z29.8 ENCOUNTER FOR OTHER SPECIFIED PROPHYLACTIC MEASURES: ICD-10-CM

## 2019-10-15 PROBLEM — Z29.8 NEED FOR SBE (SUBACUTE BACTERIAL ENDOCARDITIS) PROPHYLAXIS: Status: ACTIVE | Noted: 2019-10-15

## 2019-10-15 RX ORDER — CLOPIDOGREL BISULFATE 75 MG/1
75 TABLET, FILM COATED ORAL
Refills: 1 | Status: DISCONTINUED | COMMUNITY
Start: 2019-02-22 | End: 2019-10-15

## 2019-12-11 ENCOUNTER — RESULT REVIEW (OUTPATIENT)
Age: 84
End: 2019-12-11

## 2019-12-12 ENCOUNTER — APPOINTMENT (OUTPATIENT)
Dept: DERMATOLOGY | Facility: CLINIC | Age: 84
End: 2019-12-12
Payer: MEDICARE

## 2019-12-12 PROCEDURE — 11102 TANGNTL BX SKIN SINGLE LES: CPT

## 2019-12-12 PROCEDURE — 99213 OFFICE O/P EST LOW 20 MIN: CPT | Mod: 25

## 2019-12-19 ENCOUNTER — APPOINTMENT (OUTPATIENT)
Dept: INTERNAL MEDICINE | Facility: CLINIC | Age: 84
End: 2019-12-19
Payer: MEDICARE

## 2019-12-19 ENCOUNTER — APPOINTMENT (OUTPATIENT)
Dept: DERMATOLOGY | Facility: CLINIC | Age: 84
End: 2019-12-19

## 2019-12-19 VITALS
HEIGHT: 63 IN | HEART RATE: 82 BPM | BODY MASS INDEX: 22.5 KG/M2 | RESPIRATION RATE: 14 BRPM | DIASTOLIC BLOOD PRESSURE: 70 MMHG | SYSTOLIC BLOOD PRESSURE: 115 MMHG | WEIGHT: 127 LBS

## 2019-12-19 DIAGNOSIS — D64.9 ANEMIA, UNSPECIFIED: ICD-10-CM

## 2019-12-19 DIAGNOSIS — I48.0 PAROXYSMAL ATRIAL FIBRILLATION: ICD-10-CM

## 2019-12-19 DIAGNOSIS — I25.10 ATHEROSCLEROTIC HEART DISEASE OF NATIVE CORONARY ARTERY W/OUT ANGINA PECTORIS: ICD-10-CM

## 2019-12-19 DIAGNOSIS — E78.5 HYPERLIPIDEMIA, UNSPECIFIED: ICD-10-CM

## 2019-12-19 DIAGNOSIS — I50.22 CHRONIC SYSTOLIC (CONGESTIVE) HEART FAILURE: ICD-10-CM

## 2019-12-19 DIAGNOSIS — Z23 ENCOUNTER FOR IMMUNIZATION: ICD-10-CM

## 2019-12-19 DIAGNOSIS — Z79.899 OTHER LONG TERM (CURRENT) DRUG THERAPY: ICD-10-CM

## 2019-12-19 DIAGNOSIS — Z91.81 HISTORY OF FALLING: ICD-10-CM

## 2019-12-19 DIAGNOSIS — I87.2 VENOUS INSUFFICIENCY (CHRONIC) (PERIPHERAL): ICD-10-CM

## 2019-12-19 DIAGNOSIS — R29.3 ABNORMAL POSTURE: ICD-10-CM

## 2019-12-19 DIAGNOSIS — Z95.2 PRESENCE OF PROSTHETIC HEART VALVE: ICD-10-CM

## 2019-12-19 DIAGNOSIS — M81.0 AGE-RELATED OSTEOPOROSIS W/OUT CURRENT PATHOLOGICAL FRACTURE: ICD-10-CM

## 2019-12-19 DIAGNOSIS — I10 ESSENTIAL (PRIMARY) HYPERTENSION: ICD-10-CM

## 2019-12-19 DIAGNOSIS — Z98.61 ATHEROSCLEROTIC HEART DISEASE OF NATIVE CORONARY ARTERY W/OUT ANGINA PECTORIS: ICD-10-CM

## 2019-12-19 PROCEDURE — 36415 COLL VENOUS BLD VENIPUNCTURE: CPT

## 2019-12-19 PROCEDURE — G0442 ANNUAL ALCOHOL SCREEN 15 MIN: CPT | Mod: 59

## 2019-12-19 PROCEDURE — 90662 IIV NO PRSV INCREASED AG IM: CPT

## 2019-12-19 PROCEDURE — G0439: CPT

## 2019-12-19 PROCEDURE — G0008: CPT

## 2019-12-19 PROCEDURE — G0444 DEPRESSION SCREEN ANNUAL: CPT | Mod: 59

## 2019-12-19 RX ORDER — CEPHALEXIN 500 MG/1
500 TABLET ORAL
Qty: 14 | Refills: 0 | Status: DISCONTINUED | COMMUNITY
Start: 2019-05-16 | End: 2019-12-19

## 2019-12-19 RX ORDER — ASPIRIN 81 MG/1
81 TABLET ORAL
Refills: 0 | Status: DISCONTINUED | COMMUNITY
Start: 2019-10-15 | End: 2019-12-19

## 2019-12-19 RX ORDER — FLUTICASONE PROPIONATE 50 UG/1
50 SPRAY, METERED NASAL DAILY
Qty: 1 | Refills: 3 | Status: DISCONTINUED | COMMUNITY
Start: 2019-04-23 | End: 2019-12-19

## 2019-12-19 NOTE — HEALTH RISK ASSESSMENT
[No falls in past year] : Patient reported no falls in the past year [0] : 1) Little interest or pleasure doing things: Not at all (0) [Patient reported colonoscopy was normal] : Patient reported colonoscopy was normal [None] : None [Retired] : retired [Alone] : lives alone [High School] : high school [] :  [# Of Children ___] : has [unfilled] children [Feels Safe at Home] : Feels safe at home [Fully functional (bathing, dressing, toileting, transferring, walking, feeding)] : Fully functional (bathing, dressing, toileting, transferring, walking, feeding) [Fully functional (using the telephone, shopping, preparing meals, housekeeping, doing laundry, using] : Fully functional and needs no help or supervision to perform IADLs (using the telephone, shopping, preparing meals, housekeeping, doing laundry, using transportation, managing medications and managing finances) [Carbon Monoxide Detector] : carbon monoxide detector [Smoke Detector] : smoke detector [Seat Belt] :  uses seat belt [Sunscreen] : uses sunscreen [Discussed at today's visit] : Advance Directives Discussed at today's visit [Very Good] : ~his/her~  mood as very good [Yes] : Yes [2 - 4 times a month (2 pts)] : 2-4 times a month (2 points) [1 or 2 (0 pts)] : 1 or 2 (0 points) [Never (0 pts)] : Never (0 points) [No] : In the past 12 months have you used drugs other than those required for medical reasons? No [Reviewed no changes] : Reviewed no changes [Designated Healthcare Proxy] : Designated healthcare proxy [Name: ___] : Health Care Proxy's Name: [unfilled]  [] : No [Audit-CScore] : 2 [[id number]] : 0 [Change in mental status noted] : No change in mental status noted [Reports changes in hearing] : Reports no changes in hearing [Sexually Active] : not sexually active [Reports changes in vision] : Reports no changes in vision [Reports changes in dental health] : Reports no changes in dental health [ColonoscopyDate] : 10/09 [AdvancecareDate] : 12/19

## 2019-12-19 NOTE — ASSESSMENT
[FreeTextEntry1] : 84-year-old female with significant history of this past year with severe aortic stenosis and status post TAVR march 2019 and RCA stent January 2019.\par \par Atrial fibrillation on Eliquis\par \par Patient with decreasing memory therefore we'll do a metabolic evaluation and CAT scan of the brain. Likely neurology evaluation\par \par Patient is status post fall with pelvic fracture 2017 with no further falls\par \par Skin cancers for which she has followed up with dermatology.\par \par Mammography December 2015. Declines follow up\par Colonoscopy 2009\par Bone density January 2015. Referral given\par \par Influenza vaccine given left deltoid\par All of the vaccines up-to-date\par \par Followup in 3to4 months.

## 2019-12-19 NOTE — HISTORY OF PRESENT ILLNESS
[FreeTextEntry1] : 84-year-old female with history of moderate aortic stenosis, hypertension, hyperlipidemia presents for her yearly physical.\par \par the patient's aortic stenosis progressed and was severe and she had a TAVR March 2019.\par \par prior to this she had severe CAD and had an RCA stent January 2019\par \par Also Afib therefore on Eliquis\par \par She has recovered from these well without chest pain or shortness of breath.No CHF.\par \par She continues with recurrent skin cancers and follows with dermatology regularly.\par \par The patient fell about 2 year ago off a stool with trauma to her buttock and sustaining a left pelvic fracture which is nondisplaced.She has recovered from this well without any further falls.\par \par Otherwise no chest pain, palpitations, shortness of breath or edema.\par \par the patient's daughter most concerned about patient's decreasing memory. She continues to live alone but has help in the morning and the family a sister.They're doing everything to try to keep patient home safely.\par She needs more help.\par The patient no longer drives.

## 2019-12-19 NOTE — PHYSICAL EXAM
[General Appearance - Alert] : alert [General Appearance - In No Acute Distress] : in no acute distress [Sclera] : the sclera and conjunctiva were normal [PERRL With Normal Accommodation] : pupils were equal in size, round, and reactive to light [Extraocular Movements] : extraocular movements were intact [Outer Ear] : the ears and nose were normal in appearance [Neck Appearance] : the appearance of the neck was normal [Oropharynx] : the oropharynx was normal [Neck Cervical Mass (___cm)] : no neck mass was observed [Jugular Venous Distention Increased] : there was no jugular-venous distention [Thyroid Nodule] : there were no palpable thyroid nodules [Thyroid Diffuse Enlargement] : the thyroid was not enlarged [Auscultation Breath Sounds / Voice Sounds] : lungs were clear to auscultation bilaterally [Heart Sounds Gallop] : no gallops [Heart Rate And Rhythm] : heart rate was normal and rhythm regular [Heart Sounds] : normal S1 and S2 [Systolic grade ___/6] : A grade [unfilled]/6 systolic murmur was heard. [Heart Sounds Pericardial Friction Rub] : no pericardial rub [Full Pulse] : the pedal pulses are present [Arterial Pulses Femoral] : femoral pulses were normal without bruits [Abdominal Aorta] : the abdominal aorta was normal [Breast Palpation Mass] : no palpable masses [Edema] : there was no peripheral edema [Breast Appearance] : normal in appearance [Breast Abnormal Lactation (Galactorrhea)] : no nipple discharge [Bowel Sounds] : normal bowel sounds [Abdomen Soft] : soft [Abdomen Tenderness] : non-tender [Cervical Lymph Nodes Enlarged Posterior Bilaterally] : posterior cervical [Abdomen Mass (___ Cm)] : no abdominal mass palpated [Cervical Lymph Nodes Enlarged Anterior Bilaterally] : anterior cervical [Inguinal Lymph Nodes Enlarged Bilaterally] : inguinal [Axillary Lymph Nodes Enlarged Bilaterally] : axillary [Femoral Lymph Nodes Enlarged Bilaterally] : femoral [Supraclavicular Lymph Nodes Enlarged Bilaterally] : supraclavicular [No Spinal Tenderness] : no spinal tenderness [Abnormal Walk] : normal gait [Nail Clubbing] : no clubbing  or cyanosis of the fingernails [Motor Tone] : muscle strength and tone were normal [Musculoskeletal - Swelling] : no joint swelling seen [Skin Turgor] : normal skin turgor [Skin Color & Pigmentation] : normal skin color and pigmentation [] : no rash [No Focal Deficits] : no focal deficits [Impaired Insight] : insight and judgment were intact [Oriented To Time, Place, And Person] : oriented to person, place, and time [Affect] : the affect was normal [FreeTextEntry1] : Positive carotid bruit versus radiation of murmur,Large varicose veins especially on left

## 2019-12-19 NOTE — COUNSELING
[Healthy eating counseling provided] : healthy eating [None] : None [Good understanding] : Patient has a good understanding of lifestyle changes and the steps needed to achieve self management goals [Walking] : Walking [de-identified] : Avoid falls

## 2019-12-20 ENCOUNTER — RESULT REVIEW (OUTPATIENT)
Age: 84
End: 2019-12-20

## 2019-12-20 DIAGNOSIS — E86.0 DEHYDRATION: ICD-10-CM

## 2019-12-20 LAB
25(OH)D3 SERPL-MCNC: 30.7 NG/ML
ALBUMIN SERPL ELPH-MCNC: 4.1 G/DL
ALP BLD-CCNC: 110 U/L
ALT SERPL-CCNC: 29 U/L
ANION GAP SERPL CALC-SCNC: 15 MMOL/L
APPEARANCE: CLEAR
AST SERPL-CCNC: 32 U/L
BACTERIA: ABNORMAL
BASOPHILS # BLD AUTO: 0.08 K/UL
BASOPHILS NFR BLD AUTO: 1.2 %
BILIRUB SERPL-MCNC: 0.4 MG/DL
BILIRUBIN URINE: NEGATIVE
BLOOD URINE: NEGATIVE
BUN SERPL-MCNC: 45 MG/DL
CALCIUM SERPL-MCNC: 9.5 MG/DL
CHLORIDE SERPL-SCNC: 100 MMOL/L
CHOLEST SERPL-MCNC: 169 MG/DL
CHOLEST/HDLC SERPL: 3.5 RATIO
CO2 SERPL-SCNC: 28 MMOL/L
COLOR: YELLOW
CREAT SERPL-MCNC: 1.3 MG/DL
EOSINOPHIL # BLD AUTO: 0.11 K/UL
EOSINOPHIL NFR BLD AUTO: 1.6 %
ESTIMATED AVERAGE GLUCOSE: 123 MG/DL
GLUCOSE QUALITATIVE U: NEGATIVE
GLUCOSE SERPL-MCNC: 101 MG/DL
HBA1C MFR BLD HPLC: 5.9 %
HCT VFR BLD CALC: 43.3 %
HDLC SERPL-MCNC: 49 MG/DL
HGB BLD-MCNC: 13.6 G/DL
HYALINE CASTS: 0 /LPF
IMM GRANULOCYTES NFR BLD AUTO: 0.1 %
KETONES URINE: NEGATIVE
LDLC SERPL CALC-MCNC: 91 MG/DL
LEUKOCYTE ESTERASE URINE: NEGATIVE
LYMPHOCYTES # BLD AUTO: 2.12 K/UL
LYMPHOCYTES NFR BLD AUTO: 30.7 %
MAGNESIUM SERPL-MCNC: 2.6 MG/DL
MAN DIFF?: NORMAL
MCHC RBC-ENTMCNC: 30.8 PG
MCHC RBC-ENTMCNC: 31.4 GM/DL
MCV RBC AUTO: 98 FL
MICROSCOPIC-UA: NORMAL
MONOCYTES # BLD AUTO: 0.9 K/UL
MONOCYTES NFR BLD AUTO: 13 %
NEUTROPHILS # BLD AUTO: 3.69 K/UL
NEUTROPHILS NFR BLD AUTO: 53.4 %
NITRITE URINE: POSITIVE
PH URINE: 6
PLATELET # BLD AUTO: 194 K/UL
POTASSIUM SERPL-SCNC: 4.6 MMOL/L
PROT SERPL-MCNC: 6.4 G/DL
PROTEIN URINE: NEGATIVE
RBC # BLD: 4.42 M/UL
RBC # FLD: 14.9 %
RED BLOOD CELLS URINE: 1 /HPF
SODIUM SERPL-SCNC: 143 MMOL/L
SPECIFIC GRAVITY URINE: 1.02
SQUAMOUS EPITHELIAL CELLS: 2 /HPF
T PALLIDUM AB SER QL IA: NEGATIVE
T4 FREE SERPL-MCNC: 1.1 NG/DL
TRIGL SERPL-MCNC: 143 MG/DL
TSH SERPL-ACNC: 2.41 UIU/ML
UROBILINOGEN URINE: NORMAL
VIT B12 SERPL-MCNC: 799 PG/ML
WBC # FLD AUTO: 6.91 K/UL
WHITE BLOOD CELLS URINE: 15 /HPF

## 2020-01-03 ENCOUNTER — APPOINTMENT (OUTPATIENT)
Dept: INTERNAL MEDICINE | Facility: CLINIC | Age: 85
End: 2020-01-03
Payer: MEDICARE

## 2020-01-03 VITALS
HEART RATE: 88 BPM | TEMPERATURE: 98.4 F | DIASTOLIC BLOOD PRESSURE: 70 MMHG | HEIGHT: 63 IN | BODY MASS INDEX: 21.97 KG/M2 | OXYGEN SATURATION: 96 % | WEIGHT: 124 LBS | SYSTOLIC BLOOD PRESSURE: 120 MMHG

## 2020-01-03 DIAGNOSIS — Z87.09 PERSONAL HISTORY OF OTHER DISEASES OF THE RESPIRATORY SYSTEM: ICD-10-CM

## 2020-01-03 PROCEDURE — 99213 OFFICE O/P EST LOW 20 MIN: CPT

## 2020-01-20 NOTE — PHYSICAL EXAM
[No Acute Distress] : no acute distress [Normal Outer Ear/Nose] : the outer ears and nose were normal in appearance [Normal Oropharynx] : the oropharynx was normal [Normal TMs] : both tympanic membranes were normal [Normal Nasal Mucosa] : the nasal mucosa was normal [No Lymphadenopathy] : no lymphadenopathy [No Respiratory Distress] : no respiratory distress  [Normal Affect] : the affect was normal [Normal Mood] : the mood was normal [de-identified] : +scattered wheezing, deep cough noted, mild rhonchi with coughing only [de-identified] : stable edema LE [de-identified] : +murmur

## 2020-01-20 NOTE — HISTORY OF PRESENT ILLNESS
[Formal Caregiver] : formal caregiver [FreeTextEntry8] : Patient presents complaining of cough for one week, patient describes the cough as productive with lots of sputum. Aid reports episodic wheezing. Patient denies dyspnea/shortness of breath/fever, patient has chronic CHF but edema has been stable and she is currently on spironolactone and furosemide (UTD with cardio). Patient has not tried any OTC medication, no sick contacts or travel. Patient feels worse.

## 2020-01-20 NOTE — ADDENDUM
[FreeTextEntry1] : pt called on 1/16 stating she is feeling better\par \par Patient called on 1/13 continuing to cough, chest x-ray ordered\par \par pt called on 1/20/20-better so did not do cxr

## 2020-01-20 NOTE — ASSESSMENT
[FreeTextEntry1] : Patient prescribed  Vantin 200 mg one b.i.d. #14, Mucinex advised. Told patient to report any dyspnea/shortness of breath/worsened edema or fever, BNP not checked as patient has some component of chronic CHF. We'll pursue chest x-ray if needed .Patient advised to rest/increase fluids and use supportive therapy. Patient will call if symptoms persist or worsen and return to the office as scheduled for regular followup.\par \par \par Dr. Patterson was present in office building while I examined patient\par

## 2020-03-05 ENCOUNTER — APPOINTMENT (OUTPATIENT)
Dept: DERMATOLOGY | Facility: CLINIC | Age: 85
End: 2020-03-05
Payer: MEDICARE

## 2020-03-05 PROCEDURE — 17312 MOHS ADDL STAGE: CPT

## 2020-03-05 PROCEDURE — 15260 FTH/GFT FR N/E/E/L 20 SQCM/<: CPT

## 2020-03-05 PROCEDURE — 17311 MOHS 1 STAGE H/N/HF/G: CPT

## 2020-03-12 ENCOUNTER — APPOINTMENT (OUTPATIENT)
Dept: DERMATOLOGY | Facility: CLINIC | Age: 85
End: 2020-03-12
Payer: MEDICARE

## 2020-03-12 DIAGNOSIS — C44.91 BASAL CELL CARCINOMA OF SKIN, UNSPECIFIED: ICD-10-CM

## 2020-03-12 PROCEDURE — 99024 POSTOP FOLLOW-UP VISIT: CPT

## 2020-03-16 ENCOUNTER — APPOINTMENT (OUTPATIENT)
Dept: INTERNAL MEDICINE | Facility: CLINIC | Age: 85
End: 2020-03-16

## 2020-04-13 ENCOUNTER — APPOINTMENT (OUTPATIENT)
Dept: DERMATOLOGY | Facility: CLINIC | Age: 85
End: 2020-04-13

## 2020-05-14 ENCOUNTER — APPOINTMENT (OUTPATIENT)
Dept: DERMATOLOGY | Facility: CLINIC | Age: 85
End: 2020-05-14

## 2020-05-26 ENCOUNTER — EMERGENCY (EMERGENCY)
Facility: HOSPITAL | Age: 85
LOS: 1 days | Discharge: HOME | End: 2020-05-26
Attending: EMERGENCY MEDICINE
Payer: MEDICARE

## 2020-05-26 VITALS
HEIGHT: 65 IN | SYSTOLIC BLOOD PRESSURE: 118 MMHG | WEIGHT: 119.93 LBS | OXYGEN SATURATION: 94 % | DIASTOLIC BLOOD PRESSURE: 78 MMHG | HEART RATE: 73 BPM | TEMPERATURE: 98 F | RESPIRATION RATE: 18 BRPM

## 2020-05-26 DIAGNOSIS — Z98.61 CORONARY ANGIOPLASTY STATUS: Chronic | ICD-10-CM

## 2020-05-26 DIAGNOSIS — Z98.890 OTHER SPECIFIED POSTPROCEDURAL STATES: Chronic | ICD-10-CM

## 2020-05-26 DIAGNOSIS — H26.40 UNSPECIFIED SECONDARY CATARACT: Chronic | ICD-10-CM

## 2020-05-26 PROCEDURE — 99282 EMERGENCY DEPT VISIT SF MDM: CPT

## 2020-05-26 NOTE — ED ADULT TRIAGE NOTE - CHIEF COMPLAINT QUOTE
Pt fell today about 1730 while walking, no LOC- A+Ox4 at this time, denies any pain, did not hit head, pt fell to side as per son and "banged her arm." Pt with hematoma noted to R forearm. MD Lugo called to [facility] for evaluation as pt on "Eliquis." Trauma not indicated at this time as per MD.

## 2020-05-26 NOTE — ED STATDOCS - OBJECTIVE STATEMENT
86y/o F with PMHx of Aortic stenosis, Atrial fibrillation, CAD, Hypertension, Kyphoscoliosis, Mitral valve disease, SCC presents to the ED s/p mechanical slip and fall which occurred PTA presents with hematoma to RUE. Pt is currently on Eliquis. No dizziness. CP or palpitations prior to fall. No head trauma or LOC.

## 2020-05-26 NOTE — ED STATDOCS - NS_ ATTENDINGSCRIBEDETAILS _ED_A_ED_FT
I, Bon Lugo, performed the initial face to face bedside interview with this patient regarding history of present illness, review of symptoms and relevant past medical, social and family history.  I completed an independent physical examination.  I was the initial provider who evaluated this patient. I have signed out the follow up of any pending tests (i.e. labs, radiological studies) to the ACP.  I have communicated the patient’s plan of care and disposition with the ACP.  The history, relevant review of systems, past medical and surgical history, medical decision making, and physical examination was documented by the scribe in my presence and I attest to the accuracy of the documentation.

## 2020-06-17 ENCOUNTER — APPOINTMENT (OUTPATIENT)
Dept: INTERNAL MEDICINE | Facility: CLINIC | Age: 85
End: 2020-06-17
Payer: MEDICARE

## 2020-06-17 VITALS
HEIGHT: 63 IN | WEIGHT: 124 LBS | HEART RATE: 60 BPM | RESPIRATION RATE: 16 BRPM | DIASTOLIC BLOOD PRESSURE: 70 MMHG | SYSTOLIC BLOOD PRESSURE: 110 MMHG | BODY MASS INDEX: 21.97 KG/M2

## 2020-06-17 DIAGNOSIS — T14.8XXA OTHER INJURY OF UNSPECIFIED BODY REGION, INITIAL ENCOUNTER: ICD-10-CM

## 2020-06-17 PROCEDURE — 99213 OFFICE O/P EST LOW 20 MIN: CPT

## 2020-06-17 RX ORDER — CEFPODOXIME PROXETIL 200 MG/1
200 TABLET, FILM COATED ORAL
Qty: 14 | Refills: 0 | Status: DISCONTINUED | COMMUNITY
Start: 2020-01-03 | End: 2020-06-17

## 2020-06-17 NOTE — ASSESSMENT
[FreeTextEntry1] : Healing wound left posterior calf with mild cellulitis.\par Plan is to keep wound dry and intact\par Start Keflex 500 mg t.i.d. for one week along with probiotic b.i.d. for 10 days.\par daughter to observe for any negative changes which were discussed.\par \par Also given referral for neurology and CAT scan of the brain secondary to decreasing memory.

## 2020-06-17 NOTE — PHYSICAL EXAM
[No Acute Distress] : no acute distress [No Respiratory Distress] : no respiratory distress  [No Accessory Muscle Use] : no accessory muscle use [Clear to Auscultation] : lungs were clear to auscultation bilaterally [Normal Rate] : normal rate  [Regular Rhythm] : with a regular rhythm [Normal Gait] : normal gait [Normal Mood] : the mood was normal [de-identified] : Left calf with scabbed 1 x 0.5 cm wound posterior calf with mild edema, erythema and warmth.\par Mildly tender around wound without any flocculence or discharge.

## 2020-06-17 NOTE — HISTORY OF PRESENT ILLNESS
[FreeTextEntry8] : The patient presents with her daughter Thania secondary to swelling of her left lower leg and a scabbed lesion on her left calf initially noticed by family about 2 days ago.\par The patient does not recall any incident or injury to cause the wound.\par No drainage from the wound but the area is tender with some redness and swelling.\par No fever or chills.\par \par Also concern for worsening of memory when patient was given a referral for a CAT scan of the brain December 2019 which was not done. [Family Member] : family member

## 2020-06-26 ENCOUNTER — APPOINTMENT (OUTPATIENT)
Dept: INTERNAL MEDICINE | Facility: CLINIC | Age: 85
End: 2020-06-26
Payer: MEDICARE

## 2020-06-26 VITALS
RESPIRATION RATE: 16 BRPM | WEIGHT: 126 LBS | TEMPERATURE: 97.9 F | BODY MASS INDEX: 22.32 KG/M2 | HEART RATE: 67 BPM | DIASTOLIC BLOOD PRESSURE: 70 MMHG | SYSTOLIC BLOOD PRESSURE: 110 MMHG | HEIGHT: 63 IN

## 2020-06-26 DIAGNOSIS — L03.116 CELLULITIS OF LEFT LOWER LIMB: ICD-10-CM

## 2020-06-26 DIAGNOSIS — S81.811A LACERATION W/OUT FOREIGN BODY, RIGHT LOWER LEG, INITIAL ENCOUNTER: ICD-10-CM

## 2020-06-26 PROCEDURE — 99213 OFFICE O/P EST LOW 20 MIN: CPT

## 2020-06-26 RX ORDER — DOXYCYCLINE 100 MG/1
100 CAPSULE ORAL TWICE DAILY
Qty: 14 | Refills: 0 | Status: ACTIVE | COMMUNITY
Start: 2020-06-26 | End: 1900-01-01

## 2020-06-26 RX ORDER — CEPHALEXIN 500 MG/1
500 CAPSULE ORAL 3 TIMES DAILY
Qty: 21 | Refills: 0 | Status: DISCONTINUED | COMMUNITY
Start: 2020-06-17 | End: 2020-06-26

## 2020-06-26 NOTE — ASSESSMENT
[FreeTextEntry1] : cellulitis left lower extremity with associated lateral calf wound with wound healing well we'll continue cellulitic changes despite one week of Keflex.\par Therefore will retreat with doxycycline 100 mg twice a day for one week.\par Continue probiotic.\par \par Right mid tibial skin tear which is clean and dry without sign of infection.\par Recommended continue to keep clean and dry with dry sterile dressing and open to air when able.

## 2020-06-26 NOTE — PHYSICAL EXAM
[No Acute Distress] : no acute distress [No Respiratory Distress] : no respiratory distress  [No Accessory Muscle Use] : no accessory muscle use [Normal Rate] : normal rate  [Clear to Auscultation] : lungs were clear to auscultation bilaterally [Regular Rhythm] : with a regular rhythm [de-identified] : frail [de-identified] : Left lower extremity tibial area with erythema and mild swelling with mild warmth similar to one week ago with improved cellulitic changes around lateral calf wound. The wound is scabbed without flocculence or discharge.\par \par New mid tibial area right leg with a skin tear with no surrounding erythema or signs of infection.

## 2020-06-26 NOTE — HISTORY OF PRESENT ILLNESS
[FreeTextEntry8] : The patient presents for followup with left leg wound and cellulitis.\par The patient was seen on June 17 with cellulitis of the left lower extremity and a wound on the left lateral calf. The patient was treated with Keflex which was completed yesterday but concerned with continued redness.\par Also complained of a new wound on her right tibial area with patient denying trauma but she's not sure and does have underlying dementia. No fever, chills or ill feeling.\par  [Family Member] : family member

## 2020-06-30 ENCOUNTER — APPOINTMENT (OUTPATIENT)
Dept: CT IMAGING | Facility: CLINIC | Age: 85
End: 2020-06-30
Payer: MEDICARE

## 2020-06-30 ENCOUNTER — RESULT REVIEW (OUTPATIENT)
Age: 85
End: 2020-06-30

## 2020-06-30 ENCOUNTER — OUTPATIENT (OUTPATIENT)
Dept: OUTPATIENT SERVICES | Facility: HOSPITAL | Age: 85
LOS: 1 days | End: 2020-06-30
Payer: MEDICARE

## 2020-06-30 DIAGNOSIS — Z98.61 CORONARY ANGIOPLASTY STATUS: Chronic | ICD-10-CM

## 2020-06-30 DIAGNOSIS — Z98.890 OTHER SPECIFIED POSTPROCEDURAL STATES: Chronic | ICD-10-CM

## 2020-06-30 DIAGNOSIS — R41.3 OTHER AMNESIA: ICD-10-CM

## 2020-06-30 DIAGNOSIS — H26.40 UNSPECIFIED SECONDARY CATARACT: Chronic | ICD-10-CM

## 2020-06-30 PROCEDURE — 70450 CT HEAD/BRAIN W/O DYE: CPT

## 2020-06-30 PROCEDURE — 70450 CT HEAD/BRAIN W/O DYE: CPT | Mod: 26

## 2020-07-20 ENCOUNTER — APPOINTMENT (OUTPATIENT)
Dept: NEUROLOGY | Facility: CLINIC | Age: 85
End: 2020-07-20
Payer: MEDICARE

## 2020-07-20 VITALS — HEIGHT: 63 IN | TEMPERATURE: 96.4 F | WEIGHT: 126 LBS | BODY MASS INDEX: 22.32 KG/M2

## 2020-07-20 DIAGNOSIS — R41.3 OTHER AMNESIA: ICD-10-CM

## 2020-07-20 DIAGNOSIS — F03.90 UNSPECIFIED DEMENTIA W/OUT BEHAVIORAL DISTURBANCE: ICD-10-CM

## 2020-07-20 PROCEDURE — 99204 OFFICE O/P NEW MOD 45 MIN: CPT

## 2020-07-20 RX ORDER — DONEPEZIL HYDROCHLORIDE 5 MG/1
5 TABLET ORAL DAILY
Qty: 30 | Refills: 2 | Status: ACTIVE | COMMUNITY
Start: 2020-07-20 | End: 1900-01-01

## 2020-07-20 RX ORDER — PANTOPRAZOLE SODIUM 40 MG/1
40 GRANULE, DELAYED RELEASE ORAL
Refills: 0 | Status: ACTIVE | COMMUNITY

## 2020-07-20 RX ORDER — CLOPIDOGREL 75 MG/1
75 TABLET, FILM COATED ORAL
Refills: 0 | Status: ACTIVE | COMMUNITY

## 2020-07-21 ENCOUNTER — EMERGENCY (EMERGENCY)
Facility: HOSPITAL | Age: 85
LOS: 1 days | Discharge: HOME | End: 2020-07-21
Attending: EMERGENCY MEDICINE
Payer: MEDICARE

## 2020-07-21 VITALS
OXYGEN SATURATION: 95 % | DIASTOLIC BLOOD PRESSURE: 84 MMHG | RESPIRATION RATE: 18 BRPM | SYSTOLIC BLOOD PRESSURE: 125 MMHG | TEMPERATURE: 98 F | HEART RATE: 112 BPM | WEIGHT: 121.03 LBS

## 2020-07-21 DIAGNOSIS — H26.40 UNSPECIFIED SECONDARY CATARACT: Chronic | ICD-10-CM

## 2020-07-21 DIAGNOSIS — Z98.890 OTHER SPECIFIED POSTPROCEDURAL STATES: Chronic | ICD-10-CM

## 2020-07-21 DIAGNOSIS — Z98.61 CORONARY ANGIOPLASTY STATUS: Chronic | ICD-10-CM

## 2020-07-21 LAB
ALBUMIN SERPL ELPH-MCNC: 4.1 G/DL — SIGNIFICANT CHANGE UP (ref 3.3–5.2)
ALP SERPL-CCNC: 108 U/L — SIGNIFICANT CHANGE UP (ref 40–120)
ALT FLD-CCNC: 18 U/L — SIGNIFICANT CHANGE UP
ANION GAP SERPL CALC-SCNC: 15 MMOL/L — SIGNIFICANT CHANGE UP (ref 5–17)
APTT BLD: 29 SEC — SIGNIFICANT CHANGE UP (ref 27.5–35.5)
AST SERPL-CCNC: 33 U/L — HIGH
BASOPHILS # BLD AUTO: 0.09 K/UL — SIGNIFICANT CHANGE UP (ref 0–0.2)
BASOPHILS NFR BLD AUTO: 1.1 % — SIGNIFICANT CHANGE UP (ref 0–2)
BILIRUB SERPL-MCNC: 0.6 MG/DL — SIGNIFICANT CHANGE UP (ref 0.4–2)
BUN SERPL-MCNC: 41 MG/DL — HIGH (ref 8–20)
CALCIUM SERPL-MCNC: 9.9 MG/DL — SIGNIFICANT CHANGE UP (ref 8.6–10.2)
CHLORIDE SERPL-SCNC: 96 MMOL/L — LOW (ref 98–107)
CK SERPL-CCNC: 149 U/L — SIGNIFICANT CHANGE UP (ref 25–170)
CO2 SERPL-SCNC: 25 MMOL/L — SIGNIFICANT CHANGE UP (ref 22–29)
CREAT SERPL-MCNC: 1 MG/DL — SIGNIFICANT CHANGE UP (ref 0.5–1.3)
EOSINOPHIL # BLD AUTO: 0.08 K/UL — SIGNIFICANT CHANGE UP (ref 0–0.5)
EOSINOPHIL NFR BLD AUTO: 1 % — SIGNIFICANT CHANGE UP (ref 0–6)
GLUCOSE SERPL-MCNC: 98 MG/DL — SIGNIFICANT CHANGE UP (ref 70–99)
HCT VFR BLD CALC: 46 % — HIGH (ref 34.5–45)
HGB BLD-MCNC: 14.9 G/DL — SIGNIFICANT CHANGE UP (ref 11.5–15.5)
IMM GRANULOCYTES NFR BLD AUTO: 0.4 % — SIGNIFICANT CHANGE UP (ref 0–1.5)
INR BLD: 1.5 RATIO — HIGH (ref 0.88–1.16)
LYMPHOCYTES # BLD AUTO: 1.55 K/UL — SIGNIFICANT CHANGE UP (ref 1–3.3)
LYMPHOCYTES # BLD AUTO: 18.7 % — SIGNIFICANT CHANGE UP (ref 13–44)
MCHC RBC-ENTMCNC: 30.2 PG — SIGNIFICANT CHANGE UP (ref 27–34)
MCHC RBC-ENTMCNC: 32.4 GM/DL — SIGNIFICANT CHANGE UP (ref 32–36)
MCV RBC AUTO: 93.3 FL — SIGNIFICANT CHANGE UP (ref 80–100)
MONOCYTES # BLD AUTO: 0.96 K/UL — HIGH (ref 0–0.9)
MONOCYTES NFR BLD AUTO: 11.6 % — SIGNIFICANT CHANGE UP (ref 2–14)
NEUTROPHILS # BLD AUTO: 5.56 K/UL — SIGNIFICANT CHANGE UP (ref 1.8–7.4)
NEUTROPHILS NFR BLD AUTO: 67.2 % — SIGNIFICANT CHANGE UP (ref 43–77)
PLATELET # BLD AUTO: 226 K/UL — SIGNIFICANT CHANGE UP (ref 150–400)
POTASSIUM SERPL-MCNC: 4.7 MMOL/L — SIGNIFICANT CHANGE UP (ref 3.5–5.3)
POTASSIUM SERPL-SCNC: 4.7 MMOL/L — SIGNIFICANT CHANGE UP (ref 3.5–5.3)
PROT SERPL-MCNC: 7.3 G/DL — SIGNIFICANT CHANGE UP (ref 6.6–8.7)
PROTHROM AB SERPL-ACNC: 17.1 SEC — HIGH (ref 10.6–13.6)
RBC # BLD: 4.93 M/UL — SIGNIFICANT CHANGE UP (ref 3.8–5.2)
RBC # FLD: 14.7 % — HIGH (ref 10.3–14.5)
SODIUM SERPL-SCNC: 136 MMOL/L — SIGNIFICANT CHANGE UP (ref 135–145)
WBC # BLD: 8.27 K/UL — SIGNIFICANT CHANGE UP (ref 3.8–10.5)
WBC # FLD AUTO: 8.27 K/UL — SIGNIFICANT CHANGE UP (ref 3.8–10.5)

## 2020-07-21 PROCEDURE — 72125 CT NECK SPINE W/O DYE: CPT | Mod: 26

## 2020-07-21 PROCEDURE — 99284 EMERGENCY DEPT VISIT MOD MDM: CPT

## 2020-07-21 PROCEDURE — 93010 ELECTROCARDIOGRAM REPORT: CPT

## 2020-07-21 PROCEDURE — 70450 CT HEAD/BRAIN W/O DYE: CPT | Mod: 26

## 2020-07-21 NOTE — ED ADULT NURSE NOTE - CHIEF COMPLAINT QUOTE
c/o dizziness today as per family, pt has had multiple falls this week, fell today in driveway taking out the garbage "around dusk time" pt was found by neighbor around 9pm, on elqiuis pt a&ox2 confused to date. MD Eden called for evaluation at 2230. multiple bandages on b/l arms new skin tears on left arm bleeding controlled.  priority head ct called

## 2020-07-21 NOTE — ED ADULT NURSE NOTE - OBJECTIVE STATEMENT
PT presents to ED s/p mechanical fall at home on eliquis. PT states she was walking up her steep driveway and lost her footing.  PT states she does not remember everything that happened.  Priority CT called from [facility].  PT brought back to critical.  Multiple skin tears to B/l upper extremities.  Bruising noted to middle back.  Dressings removed wounds cleansed with irrigation and cleaned.  PT denies pain at this time. Pending lab results and CT results. CTM

## 2020-07-21 NOTE — ED PROVIDER NOTE - CHPI ED SYMPTOMS NEG
no fever/no loss of consciousness/no dizziness, no chest pain, no ha, no abdominal pain, no diarrhea, no urinary f/u/d, no rash/no vomiting

## 2020-07-21 NOTE — ED PROVIDER NOTE - PATIENT PORTAL LINK FT
You can access the FollowMyHealth Patient Portal offered by [facility] by registering at the following website: [email or web address]. By joining [employer]’s FollowMyHealth portal, you will also be able to view your health information using other applications (apps) compatible with our system.

## 2020-07-21 NOTE — ED PROVIDER NOTE - EYES NEGATIVE STATEMENT, MLM
family member family member no discharge, no irritation, no pain, no redness, and no visual changes.

## 2020-07-21 NOTE — ED PROVIDER NOTE - CLINICAL SUMMARY MEDICAL DECISION MAKING FREE TEXT BOX
spoke with son who is outside multiple times they are adamant and willing to take home she passsed her ambulation trila she is to f/u with dr betancourt and pcp return to ed for intractable HA, persistent vomiting, or new onset motor/sensory deficits

## 2020-07-21 NOTE — ED ADULT NURSE NOTE - NSIMPLEMENTINTERV_GEN_ALL_ED
Implemented All Fall with Harm Risk Interventions:  [place] to call system. Call bell, personal items and telephone within reach. Instruct patient to call for assistance. Room bathroom lighting operational. Non-slip footwear when patient is off stretcher. Physically safe environment: no spills, clutter or unnecessary equipment. Stretcher in lowest position, wheels locked, appropriate side rails in place. Provide visual cue, wrist band, yellow gown, etc. Monitor gait and stability. Monitor for mental status changes and reorient to person, place, and time. Review medications for side effects contributing to fall risk. Reinforce activity limits and safety measures with patient and family. Provide visual clues: red socks.

## 2020-07-21 NOTE — ED PROVIDER NOTE - OBJECTIVE STATEMENT
84 y/o F pt with significant PMHx of A-fib, CAD, HTN, SCC and Mitral Valve Disease presents to the ED c/o generalized body pain s/p a mechanical fall. Pt was brought in by her family who states for the past week the pt has been having an increased number of falls. Today while walking down her driveway she tripped over herself and fell. She was unable to get herself up so she called out for help and was heard by a neighbor who came and helped her up and called her family. Denies LOC. Currently on Eliquis. Pt resides alone, and ambulates with a cane or walker. Denies any symptoms of dizziness or chest pain prior to the fall. Currently denies fever. denies HA. no chest pain or sob. no abd pain. no n/v/d. no urinary f/u/d. no motor or sensory deficits. denies illicit drug use. no recent travel. no rash. no other acute issues symptoms or concerns   No further complaints at this time.

## 2020-07-21 NOTE — ED ADULT TRIAGE NOTE - CHIEF COMPLAINT QUOTE
c/o dizziness today as per family, pt has had multiple falls this week, fell today in driveway taking out the garbage "around dusk time" pt was found by neighbor around 9pm, on elqiuis pt a&ox2 confused to date. MD Eden called for evaluation at 2230. multiple bandages on b/l arms new skin tears on left arm bleeding controlled. c/o dizziness today as per family, pt has had multiple falls this week, fell today in driveway taking out the garbage "around dusk time" pt was found by neighbor around 9pm, on elqiuis pt a&ox2 confused to date. MD Eden called for evaluation at 2230. multiple bandages on b/l arms new skin tears on left arm bleeding controlled.  priority head ct called

## 2020-07-22 VITALS
DIASTOLIC BLOOD PRESSURE: 79 MMHG | RESPIRATION RATE: 20 BRPM | HEART RATE: 83 BPM | OXYGEN SATURATION: 94 % | SYSTOLIC BLOOD PRESSURE: 130 MMHG | TEMPERATURE: 97 F

## 2020-07-22 PROCEDURE — 93005 ELECTROCARDIOGRAM TRACING: CPT

## 2020-07-22 PROCEDURE — 73080 X-RAY EXAM OF ELBOW: CPT | Mod: 26,LT

## 2020-07-22 PROCEDURE — 74177 CT ABD & PELVIS W/CONTRAST: CPT

## 2020-07-22 PROCEDURE — 70450 CT HEAD/BRAIN W/O DYE: CPT

## 2020-07-22 PROCEDURE — 85730 THROMBOPLASTIN TIME PARTIAL: CPT

## 2020-07-22 PROCEDURE — 99284 EMERGENCY DEPT VISIT MOD MDM: CPT

## 2020-07-22 PROCEDURE — 73080 X-RAY EXAM OF ELBOW: CPT

## 2020-07-22 PROCEDURE — 74177 CT ABD & PELVIS W/CONTRAST: CPT | Mod: 26

## 2020-07-22 PROCEDURE — 85610 PROTHROMBIN TIME: CPT

## 2020-07-22 PROCEDURE — 85027 COMPLETE CBC AUTOMATED: CPT

## 2020-07-22 PROCEDURE — 72125 CT NECK SPINE W/O DYE: CPT

## 2020-07-22 PROCEDURE — 80053 COMPREHEN METABOLIC PANEL: CPT

## 2020-07-22 PROCEDURE — 36415 COLL VENOUS BLD VENIPUNCTURE: CPT

## 2020-07-22 PROCEDURE — 82550 ASSAY OF CK (CPK): CPT

## 2020-07-22 PROCEDURE — 71260 CT THORAX DX C+: CPT | Mod: 26

## 2020-07-22 PROCEDURE — 71260 CT THORAX DX C+: CPT

## 2020-07-22 NOTE — ED ADULT NURSE REASSESSMENT NOTE - NS ED NURSE REASSESS COMMENT FT1
Patient in no apparent distress. Resting comfortably in stretcher. Denies any pain or discomfort at this time. Remains on cardiac monitor with continuous pulse ox. See flowsheet as per vital signs. Vital signs stable. Respirations even, spontaneous, and unlabored. Awaiting CT results. Plan of care explained. Verbalized understanding. Call bell within reach.

## 2020-07-31 ENCOUNTER — INPATIENT (INPATIENT)
Facility: HOSPITAL | Age: 85
LOS: 5 days | Discharge: HOME | DRG: 309 | End: 2020-08-06
Attending: HOSPITALIST | Admitting: INTERNAL MEDICINE
Payer: MEDICARE

## 2020-07-31 VITALS
HEART RATE: 58 BPM | OXYGEN SATURATION: 98 % | RESPIRATION RATE: 20 BRPM | DIASTOLIC BLOOD PRESSURE: 66 MMHG | TEMPERATURE: 98 F | SYSTOLIC BLOOD PRESSURE: 123 MMHG | HEIGHT: 65 IN | WEIGHT: 164.91 LBS

## 2020-07-31 DIAGNOSIS — Z98.890 OTHER SPECIFIED POSTPROCEDURAL STATES: Chronic | ICD-10-CM

## 2020-07-31 DIAGNOSIS — I48.91 UNSPECIFIED ATRIAL FIBRILLATION: ICD-10-CM

## 2020-07-31 DIAGNOSIS — H26.40 UNSPECIFIED SECONDARY CATARACT: Chronic | ICD-10-CM

## 2020-07-31 DIAGNOSIS — Z98.61 CORONARY ANGIOPLASTY STATUS: Chronic | ICD-10-CM

## 2020-07-31 LAB
ALBUMIN SERPL ELPH-MCNC: 3.7 G/DL — SIGNIFICANT CHANGE UP (ref 3.3–5.2)
ALP SERPL-CCNC: 124 U/L — HIGH (ref 40–120)
ALT FLD-CCNC: 14 U/L — SIGNIFICANT CHANGE UP
ANION GAP SERPL CALC-SCNC: 11 MMOL/L — SIGNIFICANT CHANGE UP (ref 5–17)
AST SERPL-CCNC: 22 U/L — SIGNIFICANT CHANGE UP
BASOPHILS # BLD AUTO: 0.05 K/UL — SIGNIFICANT CHANGE UP (ref 0–0.2)
BASOPHILS NFR BLD AUTO: 0.4 % — SIGNIFICANT CHANGE UP (ref 0–2)
BILIRUB SERPL-MCNC: 1 MG/DL — SIGNIFICANT CHANGE UP (ref 0.4–2)
BUN SERPL-MCNC: 20 MG/DL — SIGNIFICANT CHANGE UP (ref 8–20)
CALCIUM SERPL-MCNC: 9.9 MG/DL — SIGNIFICANT CHANGE UP (ref 8.6–10.2)
CHLORIDE SERPL-SCNC: 97 MMOL/L — LOW (ref 98–107)
CO2 SERPL-SCNC: 24 MMOL/L — SIGNIFICANT CHANGE UP (ref 22–29)
CREAT SERPL-MCNC: 0.66 MG/DL — SIGNIFICANT CHANGE UP (ref 0.5–1.3)
EOSINOPHIL # BLD AUTO: 0.01 K/UL — SIGNIFICANT CHANGE UP (ref 0–0.5)
EOSINOPHIL NFR BLD AUTO: 0.1 % — SIGNIFICANT CHANGE UP (ref 0–6)
GLUCOSE SERPL-MCNC: 112 MG/DL — HIGH (ref 70–99)
HCT VFR BLD CALC: 48 % — HIGH (ref 34.5–45)
HGB BLD-MCNC: 15.9 G/DL — HIGH (ref 11.5–15.5)
IMM GRANULOCYTES NFR BLD AUTO: 0.3 % — SIGNIFICANT CHANGE UP (ref 0–1.5)
LYMPHOCYTES # BLD AUTO: 1.1 K/UL — SIGNIFICANT CHANGE UP (ref 1–3.3)
LYMPHOCYTES # BLD AUTO: 9.4 % — LOW (ref 13–44)
MAGNESIUM SERPL-MCNC: 2.3 MG/DL — SIGNIFICANT CHANGE UP (ref 1.8–2.6)
MCHC RBC-ENTMCNC: 29.8 PG — SIGNIFICANT CHANGE UP (ref 27–34)
MCHC RBC-ENTMCNC: 33.1 GM/DL — SIGNIFICANT CHANGE UP (ref 32–36)
MCV RBC AUTO: 89.9 FL — SIGNIFICANT CHANGE UP (ref 80–100)
MONOCYTES # BLD AUTO: 1.27 K/UL — HIGH (ref 0–0.9)
MONOCYTES NFR BLD AUTO: 10.8 % — SIGNIFICANT CHANGE UP (ref 2–14)
NEUTROPHILS # BLD AUTO: 9.25 K/UL — HIGH (ref 1.8–7.4)
NEUTROPHILS NFR BLD AUTO: 79 % — HIGH (ref 43–77)
NT-PROBNP SERPL-SCNC: 2166 PG/ML — HIGH (ref 0–300)
PLATELET # BLD AUTO: 268 K/UL — SIGNIFICANT CHANGE UP (ref 150–400)
POTASSIUM SERPL-MCNC: 4.6 MMOL/L — SIGNIFICANT CHANGE UP (ref 3.5–5.3)
POTASSIUM SERPL-SCNC: 4.6 MMOL/L — SIGNIFICANT CHANGE UP (ref 3.5–5.3)
PROT SERPL-MCNC: 7.2 G/DL — SIGNIFICANT CHANGE UP (ref 6.6–8.7)
RBC # BLD: 5.34 M/UL — HIGH (ref 3.8–5.2)
RBC # FLD: 14.3 % — SIGNIFICANT CHANGE UP (ref 10.3–14.5)
SODIUM SERPL-SCNC: 132 MMOL/L — LOW (ref 135–145)
TROPONIN T SERPL-MCNC: 0.04 NG/ML — SIGNIFICANT CHANGE UP (ref 0–0.06)
WBC # BLD: 11.72 K/UL — HIGH (ref 3.8–10.5)
WBC # FLD AUTO: 11.72 K/UL — HIGH (ref 3.8–10.5)

## 2020-07-31 PROCEDURE — 73060 X-RAY EXAM OF HUMERUS: CPT | Mod: 26,LT

## 2020-07-31 PROCEDURE — 73090 X-RAY EXAM OF FOREARM: CPT | Mod: 26,LT

## 2020-07-31 PROCEDURE — 99291 CRITICAL CARE FIRST HOUR: CPT | Mod: GC

## 2020-07-31 PROCEDURE — 99223 1ST HOSP IP/OBS HIGH 75: CPT

## 2020-07-31 PROCEDURE — 72125 CT NECK SPINE W/O DYE: CPT | Mod: 26

## 2020-07-31 PROCEDURE — 73070 X-RAY EXAM OF ELBOW: CPT | Mod: 26,LT

## 2020-07-31 PROCEDURE — 70450 CT HEAD/BRAIN W/O DYE: CPT | Mod: 26

## 2020-07-31 PROCEDURE — 71045 X-RAY EXAM CHEST 1 VIEW: CPT | Mod: 26

## 2020-07-31 PROCEDURE — 73110 X-RAY EXAM OF WRIST: CPT | Mod: 26,LT

## 2020-07-31 PROCEDURE — 93010 ELECTROCARDIOGRAM REPORT: CPT

## 2020-07-31 PROCEDURE — 99497 ADVNCD CARE PLAN 30 MIN: CPT

## 2020-07-31 RX ORDER — DONEPEZIL HYDROCHLORIDE 10 MG/1
1 TABLET, FILM COATED ORAL
Qty: 0 | Refills: 0 | DISCHARGE

## 2020-07-31 RX ORDER — ACETAMINOPHEN 500 MG
650 TABLET ORAL EVERY 6 HOURS
Refills: 0 | Status: DISCONTINUED | OUTPATIENT
Start: 2020-07-31 | End: 2020-08-06

## 2020-07-31 RX ORDER — METOPROLOL TARTRATE 50 MG
50 TABLET ORAL ONCE
Refills: 0 | Status: COMPLETED | OUTPATIENT
Start: 2020-07-31 | End: 2020-07-31

## 2020-07-31 RX ORDER — FUROSEMIDE 40 MG
40 TABLET ORAL
Refills: 0 | Status: DISCONTINUED | OUTPATIENT
Start: 2020-07-31 | End: 2020-08-03

## 2020-07-31 RX ORDER — METOPROLOL TARTRATE 50 MG
2.5 TABLET ORAL ONCE
Refills: 0 | Status: COMPLETED | OUTPATIENT
Start: 2020-07-31 | End: 2020-07-31

## 2020-07-31 RX ORDER — METOPROLOL TARTRATE 50 MG
1 TABLET ORAL
Qty: 0 | Refills: 0 | DISCHARGE

## 2020-07-31 RX ORDER — SPIRONOLACTONE 25 MG/1
25 TABLET, FILM COATED ORAL DAILY
Refills: 0 | Status: DISCONTINUED | OUTPATIENT
Start: 2020-07-31 | End: 2020-08-04

## 2020-07-31 RX ORDER — METOPROLOL TARTRATE 50 MG
100 TABLET ORAL
Refills: 0 | Status: DISCONTINUED | OUTPATIENT
Start: 2020-07-31 | End: 2020-08-04

## 2020-07-31 RX ORDER — APIXABAN 2.5 MG/1
1 TABLET, FILM COATED ORAL
Qty: 0 | Refills: 0 | DISCHARGE

## 2020-07-31 RX ADMIN — Medication 650 MILLIGRAM(S): at 14:35

## 2020-07-31 RX ADMIN — Medication 2.5 MILLIGRAM(S): at 13:46

## 2020-07-31 RX ADMIN — Medication 50 MILLIGRAM(S): at 14:00

## 2020-07-31 RX ADMIN — Medication 40 MILLIGRAM(S): at 18:13

## 2020-07-31 NOTE — H&P ADULT - HISTORY OF PRESENT ILLNESS
84 y/o female pt with significant PMHx of A-fib, CAD, HTN, SCC and Mitral Valve Disease.    Daughter notes cognigitive decline. She has been living with daughter for the past week. and in the last week has noticed that she has had functional decline and requiring more assistance.    10 days prior she had a fall. She has been getting wound care at the house for her arm. She had another fall since she left and hurt her arm again including wrist. Patient complains of left elbow pain. Daughter notes sleeping during the day and at times up at night. She was on donepezil for a day and that was stopped due bad reaction (rigidity and hallucinations).    It was a scheduled visit with Dr. Alexis.    [place] [facility].    as per daughter patient is DNR/DNI, would not want CPR in the event that her heart were to stop and she were to pass away from natural death 84 y/o female pt with significant PMHx of [facility] (no longer considered a candidate for eliquis due to falls), CAD, HTN, SCC and Mitral Valve Disease who presents from cardiologists office after a scheduled routine visit in [facility] with RVR. Patient denies symptoms or change in breathing/chest pain. Patient received lopressor 2.5mg IV push in the ED and HR improved. She was here recently 10 days ago and evaluated for a fall at that time. She was sent home with her family after no fracture identified. Patient until recently has been living on her own. She now lives with her daughter who notes a steady cognitive decline over the last week including functional decline in terms of needing more assistance to get up and move around. Since her last ED visit 10 days ago she had another fall and hurt her arm again including wrist. Patient during exam complains of left elbow pain. Daughter notes sleeping during the day and at times up at night. She was on donepezil for a day and that was stopped due bad reaction (rigidity and hallucinations).

## 2020-07-31 NOTE — ED PROVIDER NOTE - PSH
After-cataract of both eyes    H/O colonoscopy    H/O varicose vein stripping    History of PTCA  one stent

## 2020-07-31 NOTE — ED PROVIDER NOTE - ATTENDING CONTRIBUTION TO CARE
The patient seen and examined    Rapid atrial fibrillation    I, Vishnu Quintanilla, performed the initial face to face bedside interview with this patient regarding history of present illness, review of symptoms and relevant past medical, social and family history.  I completed an independent physical examination.  I was the initial provider who evaluated this patient. I have signed out the follow up of any pending tests (i.e. labs, radiological studies) to the resident.  I have communicated the patient’s plan of care and disposition with the resident.

## 2020-07-31 NOTE — H&P ADULT - NSHPPHYSICALEXAM_GEN_ALL_CORE
PHYSICAL EXAM:    General: Well developed; well nourished; in no acute distress  Eyes: PERRLA, EOMI; conjunctiva and sclera clear  Head: Normocephalic; atraumatic  ENMT: No nasal discharge; airway clear  Neck: Supple; non tender; no masses  Respiratory: No wheezes, rales or rhonchi  Cardiovascular: Regular rate and rhythm. S1 and S2 Normal; No murmurs, gallops or rubs  Gastrointestinal: Soft non-tender non-distended; Normal bowel sounds  Genitourinary: No costovertebral angle tenderness  Extremities: Normal range of motion, No clubbing, cyanosis or edema  Vascular: Peripheral pulses palpable 2+ bilaterally  Neurological: Alert and oriented x4  Skin: Warm and dry. No acute rash  Lymph Nodes: No acute cervical adenopathy  Musculoskeletal: Normal gait, tone, without deformities  Psychiatric: Cooperative and appropriate PHYSICAL EXAM:  General: Well developed; in no acute distress  Eyes: PERRLA, EOMI; conjunctiva and sclera clear  Head: Normocephalic; atraumatic  ENMT: No nasal discharge; airway clear  Neck: Supple; non tender; no masses  Respiratory: No wheezes, rales or rhonchi  Cardiovascular: irregular rate and rhythm. S1 and S2 Normal; No murmurs, gallops or rubs  Gastrointestinal: Soft non-tender non-distended; Normal bowel sounds  Genitourinary: No costovertebral angle tenderness  Extremities: Normal range of motion, No clubbing, cyanosis or edema  Vascular: Peripheral pulses palpable 2+ bilaterally  Neurological: Alert and oriented to self but not place, situation, year  Skin: Warm and dry. No acute rash  Psychiatric: Cooperative and appropriate

## 2020-07-31 NOTE — ED ADULT TRIAGE NOTE - CHIEF COMPLAINT QUOTE
Patient presents to ER for medical evaluation, patient C/O palpitation, patient was at Dr office and found to be in A fib, patient is awake, alert and oriented X 3, resp even/unlabored.

## 2020-07-31 NOTE — ED ADULT NURSE NOTE - OBJECTIVE STATEMENT
pt A&OX2- disoriented to date,  pt sent from [facility] Cardio for rapid afib and SOB pt was given 2.5mg Lopressor IV @1230pm at the cardio office, pt resp even and unlabored no distress noted, pt denies fever/chills, sob, chest pain, abd pain n/v/d ... pt fell 7/21 and has skin tears to bilat arms and Lac to left elbow

## 2020-07-31 NOTE — ED ADULT TRIAGE NOTE - PAIN: PRESENCE, MLM
TRANSITIONAL CARE MANAGEMENT - HOSPITAL DISCHARGE FOLLOW-UP    Contacted Mr. Beard regarding follow-up for left sided numbness of unknown etiology,  after hospital discharge. He was discharged from the [facility] on 8/3/17 (admitted 8/2/17). Review of the After Visit Summary from the recent hospitalization indicates that the patient needs to follow up with his PCP, Dr. Jaylen Garcia .    He feels that he is doing very well at home.   His diet concern is none. Overall, the patient is eating well.   Ambulation: stayed the same  Fever: is not present  Pain: none  Activities of Daily Living (global): Self-care   Patient states that he does have sufficient family support. He feels that he is able to ask for assistance when needed.     Additional patient/family concerns: None .    Discharge medications were verified with the patient. He is fully compliant with the medication regimen prescribed at the time of discharge. He will discuss his medications at the TCM appointment, as he was advised to make some dose adjustments but declined, saying to hospitalist that he would wait and get advice from  before changing any medication doses.    Upon discharge, the patient was to receive no additional services.     Patient has an appointment on Tuesday, 8/8/17 at 1:45 pm with Jaylen Garcia MD. Mr. Beard was reminded about the importance of keeping this appointment.      denies pain/discomfort

## 2020-07-31 NOTE — ED PROVIDER NOTE - PHYSICAL EXAMINATION
Const: Awake, alert and oriented x3. In No Acute Distress. Elderly female  HEENT: NC/AT, PERRLA, EOMI, clear nares, Moist mucous membranes, normal oropharynx. No erythema, lesions, secretions.   Neck: Soft and supple. Full ROM without pain.  Cardiovascular: Tachycardia, +S1/S2. No murmurs. Peripheral pulses 2+ and symmetric x4. No LE edema.  Respiratory: Speaking in full sentences. No evidence of respiratory distress. CTAB. No wheezes, crackles, rales or rhonchi.  Abd: Flat Abdomen, Normal bowel sounds in all 4 quadrants, Soft, non-distended. non-tender. No guarding or rebound. Negative Lucio, McBurney, Rovsing.   Back: Spine midline, non-tender. No muscular spasms. No CVA Tenderness.  Skin: No rashes, discolorations, lesions, ulcers noted.   Lymph: No cervical, axillary or inguinal lymphadenopathy noted.  Neuro: Awake, alert & oriented x 3, CN II-XII grossly intact, Strength 5/5 in all extremities, Sensation preserved in all extremities. DTR +2 bilaterally. Const: Awake, alert and oriented x3. In No Acute Distress. Elderly female  HEENT: NC/AT, PERRLA, EOMI, clear nares, Moist mucous membranes, normal oropharynx. No erythema, lesions, secretions.   Neck: Soft and supple. Full ROM without pain.  Cardiovascular: Tachycardia, +S1/S2. No murmurs. Peripheral pulses 2+ and symmetric x4. No LE edema.  Respiratory: Speaking in full sentences. No evidence of respiratory distress. CTAB. No wheezes, crackles, rales or rhonchi.  Abd: Flat Abdomen, Normal bowel sounds in all 4 quadrants, Soft, non-distended. non-tender. No guarding or rebound. Negative Lucio, McBurney, Rovsing.   Back: Spine non-tender. No muscular spasms. No CVA Tenderness.  Skin: Noted abrasions in bilateral arms, 1 cm oval wound with blood clots, non suppurating. No rashes, discolorations, lesions, ulcers noted.   Neuro: Awake, alert & oriented x 3, CN II-XII grossly intact, grossly moving all extremities

## 2020-07-31 NOTE — H&P ADULT - NSICDXPASTSURGICALHX_GEN_ALL_CORE_FT
PAST SURGICAL HISTORY:  After-cataract of both eyes     H/O colonoscopy     H/O varicose vein stripping     History of PTCA one stent

## 2020-07-31 NOTE — ED PROVIDER NOTE - NS ED ROS FT
Const: Denies fever, chills, malaise, fatigue, night sweats  HEENT: Denies changes in vision, sore throat  Neck: Has mild left sided neck pain. Denies neck stiffness  Resp: Denies coughing, sneezing, SOB  Cardiovascular: Denies CP, palpitations, LE edema  GI: Denies nausea, vomiting, abdominal pain, diarrhea, constipation, blood in stool  : Denies urinary frequency/urgency/dysuria, hematuria  MSK: Denies back pain  Neuro: Denies HA, dizziness, numbness, focal weaknesses, LOC  Skin: Denies rashes

## 2020-07-31 NOTE — H&P ADULT - ASSESSMENT
84 y/o female pt with significant PMHx of A-fib (no longer considered a candidate for eliquis due to falls), CAD, HTN, SCC and Mitral Valve Disease who presents from cardiologists office after a scheduled routine visit in A-fib with RVR. also at home has had functional decline    #a-fib with RVR  - improved  - admit to tele  - continue metoprolol 100mg BID for now  - per cardiology increase the metoprolol dose for now - discussed with Dr. Alexis  - not on AC due to falls    #weakness  - PT to evaluate  - check UA  - check TSH and B12    #cognitive decline  - likely onset of dementia  - patient follows with Dr. Atkins  - prescribed donepezil but had a bad reaction - no longer taking    #CAD  - continue aspirin  - not on plavix per pharmacy    #HTN  - continue home spironolactone/lasix    #DVT ppx  - SCD for now    #code status  - discussed with daughter - she has DNR/DNI paperwork at home including HCP she thinks; in the event of sudden cardiac death patient would not want to be resuscitated and would prefer a natural death. ALISSON placed in chart 25 minutes spent in advanced care planning.

## 2020-07-31 NOTE — ED PROVIDER NOTE - OBJECTIVE STATEMENT
84 y/o F with PMHx of HTN, pAF, HLD, SCC, Mitral Valve disease, Dementia was sent from her cardiologist today due to afib with RVR. Given Metoprolol 2.5 mg IVP x1 with no response. Pt reports mild left sided neck pain only. Denies CP, SOB. Pt is pt is a poor historian. Daughter provided hx of pt having episodes of increased generalized weakness, confusion and multiple falls for 1 week. Pt was seen 1 week ago due to fall with bilateral arm scrapping and left elbow punture wound. Xrays then were negative for fractures.

## 2020-07-31 NOTE — H&P ADULT - NSICDXPASTMEDICALHX_GEN_ALL_CORE_FT
PAST MEDICAL HISTORY:  Aortic stenosis     Atrial fibrillation     CAD (coronary artery disease)     Hypertension     Kyphoscoliosis     Mitral valve disease     SCC (squamous cell carcinoma)     Varicose vein of leg

## 2020-07-31 NOTE — H&P ADULT - NSHPLABSRESULTS_GEN_ALL_CORE
15.9   11.72 )-----------( 268      ( 31 Jul 2020 14:05 )             48.0     07-31    132<L>  |  97<L>  |  20.0  ----------------------------<  112<H>  4.6   |  24.0  |  0.66    Ca    9.9      31 Jul 2020 14:05  Mg     2.3     07-31    TPro  7.2  /  Alb  3.7  /  TBili  1.0  /  DBili  x   /  AST  22  /  ALT  14  /  AlkPhos  124<H>  07-31

## 2020-07-31 NOTE — H&P ADULT - NSHPREVIEWOFSYSTEMS_GEN_ALL_CORE
REVIEW OF SYSTEMS  General: denies weakness, malaise  Skin/Breast: no new rash  Ophthalmologic: no change in vision  ENMT: no dysphagia, throat pain or change in hearing  Respiratory and Thorax: no difficulty breathing or chest pain  Cardiovascular: no palpitations or PND, orthopnea  Gastrointestinal: no abdominal pain, normal bowel movement, no dark stools  Genitourinary: no difficulty urinating, no burning urination  Musculoskeletal: no myalgia/arthrlagia  Neurological: no weakness, numbness, change in gait  Psychiatric: no depression, anxiety  Hematology/Lymphatics: denies easy bruising or bleeding  Endocrine: no polyuria, polydipsia REVIEW OF SYSTEMS  General: denies weakness, malaise  Skin/Breast: no new rash  Ophthalmologic: no change in vision  ENMT: no dysphagia, throat pain or change in hearing  Respiratory and Thorax: no difficulty breathing or chest pain  Cardiovascular: no palpitations or PND, orthopnea  Gastrointestinal: no abdominal pain, normal bowel movement, no dark stools  Genitourinary: no difficulty urinating, no burning urination  Musculoskeletal: +left elbow pain  Neurological: +weakness; denies numbness, change in gait  Psychiatric: no depression, anxiety  Hematology/Lymphatics: denies easy bruising or bleeding  Endocrine: no polyuria, polydipsia

## 2020-08-01 LAB
APPEARANCE UR: CLEAR — SIGNIFICANT CHANGE UP
BACTERIA # UR AUTO: ABNORMAL
BILIRUB UR-MCNC: NEGATIVE — SIGNIFICANT CHANGE UP
COLOR SPEC: YELLOW — SIGNIFICANT CHANGE UP
DIFF PNL FLD: ABNORMAL
EPI CELLS # UR: ABNORMAL
GLUCOSE UR QL: NEGATIVE — SIGNIFICANT CHANGE UP
KETONES UR-MCNC: NEGATIVE — SIGNIFICANT CHANGE UP
LEUKOCYTE ESTERASE UR-ACNC: ABNORMAL
NITRITE UR-MCNC: POSITIVE
PH UR: 6 — SIGNIFICANT CHANGE UP (ref 5–8)
PROT UR-MCNC: 30 MG/DL
RBC CASTS # UR COMP ASSIST: SIGNIFICANT CHANGE UP /HPF (ref 0–4)
SARS-COV-2 IGG SERPL QL IA: POSITIVE
SARS-COV-2 IGM SERPL IA-ACNC: 2.7 INDEX — HIGH
SARS-COV-2 RNA SPEC QL NAA+PROBE: SIGNIFICANT CHANGE UP
SP GR SPEC: 1.01 — SIGNIFICANT CHANGE UP (ref 1.01–1.02)
TSH SERPL-MCNC: 4.04 UIU/ML — SIGNIFICANT CHANGE UP (ref 0.27–4.2)
UROBILINOGEN FLD QL: 1
VIT B12 SERPL-MCNC: 698 PG/ML — SIGNIFICANT CHANGE UP (ref 232–1245)
WBC UR QL: >50

## 2020-08-01 PROCEDURE — 99233 SBSQ HOSP IP/OBS HIGH 50: CPT

## 2020-08-01 RX ORDER — METOPROLOL TARTRATE 50 MG
5 TABLET ORAL EVERY 6 HOURS
Refills: 0 | Status: DISCONTINUED | OUTPATIENT
Start: 2020-08-01 | End: 2020-08-01

## 2020-08-01 RX ORDER — METOPROLOL TARTRATE 50 MG
5 TABLET ORAL EVERY 6 HOURS
Refills: 0 | Status: DISCONTINUED | OUTPATIENT
Start: 2020-08-01 | End: 2020-08-06

## 2020-08-01 RX ORDER — QUETIAPINE FUMARATE 200 MG/1
12.5 TABLET, FILM COATED ORAL AT BEDTIME
Refills: 0 | Status: DISCONTINUED | OUTPATIENT
Start: 2020-08-01 | End: 2020-08-06

## 2020-08-01 RX ADMIN — Medication 100 MILLIGRAM(S): at 05:47

## 2020-08-01 RX ADMIN — Medication 100 MILLIGRAM(S): at 18:07

## 2020-08-01 RX ADMIN — SPIRONOLACTONE 25 MILLIGRAM(S): 25 TABLET, FILM COATED ORAL at 05:46

## 2020-08-01 RX ADMIN — QUETIAPINE FUMARATE 12.5 MILLIGRAM(S): 200 TABLET, FILM COATED ORAL at 20:23

## 2020-08-01 RX ADMIN — Medication 40 MILLIGRAM(S): at 18:07

## 2020-08-01 RX ADMIN — Medication 5 MILLIGRAM(S): at 22:49

## 2020-08-01 RX ADMIN — Medication 40 MILLIGRAM(S): at 05:47

## 2020-08-01 NOTE — PROGRESS NOTE ADULT - SUBJECTIVE AND OBJECTIVE BOX
[facility], [facility]                                   [address] [place] [address]                                                      PHONE: [phone]                                                         FAX: [phone]  [email or web address]  ---------------------------------------------------------------------------------------------------------------------------------    Overnight events/patient complaints:     Admitted from office with rapid AF hx of chronic AF S/P failed DCCV, CAD s/p PCI of RCA , S/P TAVR , RV failure her AF rate improved overnight   No CP less SOB overnight,     PAST MEDICAL & SURGICAL HISTORY:  Atrial fibrillation  CAD (coronary artery disease)  Kyphoscoliosis  Aortic stenosis  SCC (squamous cell carcinoma)  Varicose vein of leg  Mitral valve disease  Hypertension  History of PTCA: one stent  After-cataract of both eyes  H/O colonoscopy  H/O varicose vein stripping    No Known Allergies    MEDICATIONS  (STANDING):  furosemide    Tablet 40 milliGRAM(s) Oral two times a day  metoprolol tartrate 100 milliGRAM(s) Oral two times a day  spironolactone 25 milliGRAM(s) Oral daily    MEDICATIONS  (PRN):  acetaminophen   Tablet .. 650 milliGRAM(s) Oral every 6 hours PRN Mild Pain (1 - 3), Moderate Pain (4 - 6)      Vital Signs Last 24 Hrs  T(C): 36.4 (01 Aug 2020 10:55), Max: 36.9 (31 Jul 2020 18:39)  T(F): 97.6 (01 Aug 2020 10:55), Max: 98.4 (31 Jul 2020 18:39)  HR: 87 (01 Aug 2020 10:55) (75 - 104)  BP: 123/70 (01 Aug 2020 10:55) (101/68 - 130/94)  BP(mean): --  RR: 20 (01 Aug 2020 10:55) (19 - 20)  SpO2: 95% (01 Aug 2020 10:55) (91% - 97%)  ICU Vital Signs Last 24 Hrs  SHERMAN BLACKWELL  I&O's Detail    I&O's Summary    Drug Dosing Weight  SHERMAN BLACKWELL    REVIEW OF SYSTEMS:    Constitutional: No fever, weight loss or fatigue  Eyes: No eye pain, visual disturbances, or discharge  ENMT:  No difficulty hearing, tinnitus, vertigo; No sinus or throat pain  Neck: No pain or stiffness  Respiratory: No cough, wheezing, chills or hemoptysis  Cardiovascular: No chest pain, palpitations, shortness of breath, dizziness or leg swelling  Gastrointestinal: No abdominal or epigastric pain. No nausea, vomiting or hematemesis; No diarrhea or constipation. No melena or hematochezia.  Genitourinary: No dysuria, frequency, hematuria or incontinence  Rectal: No pain, hemorrhoids or incontinence  Neurological: No headaches, memory loss, loss of strength, numbness or tremors  Skin: No itching, burning, rashes or lesions   Lymph Nodes: No enlarged glands  Endocrine: No heat or cold intolerance; No hair loss  Musculoskeletal: No joint pain or swelling; No muscle, back or extremity pain  Psychiatric: No depression, anxiety, mood swings or difficulty sleeping  Heme/Lymph: No easy bruising or bleeding gums  Allergy and Immunologic: No hives or eczema    PHYSICAL EXAM:  General: Appears well developed, well nourished alert and cooperative.  HEENT: Head; normocephalic, atraumatic.  Eyes: Pupils reactive, cornea wnl.  Neck: Supple, no nodes adenopathy, no NVD or carotid bruit or thyromegaly.  CARDIOVASCULAR: Normal S1 and S2, No murmur, rub, gallop or lift.   LUNGS: No rales, rhonchi or wheeze. Normal breath sounds bilaterally.  ABDOMEN: Soft, nontender without mass or organomegaly. bowel sounds normoactive.  EXTREMITIES: No clubbing, cyanosis or edema. Distal pulses wnl.   SKIN: warm and dry with normal turgor.  NEURO: Alert/oriented x 3/normal motor exam. No pathologic reflexes.    PSYCH: normal affect.        LABS:                        15.9   11.72 )-----------( 268      ( 31 Jul 2020 14:05 )             48.0     07-31    132<L>  |  97<L>  |  20.0  ----------------------------<  112<H>  4.6   |  24.0  |  0.66    Ca    9.9      31 Jul 2020 14:05  Mg     2.3     07-31    TPro  7.2  /  Alb  3.7  /  TBili  1.0  /  DBili  x   /  AST  22  /  ALT  14  /  AlkPhos  124<H>  07-31    SHERMAN BLACKWELL  CARDIAC MARKERS ( 31 Jul 2020 14:05 )  x     / 0.04 ng/mL / x     / x     / x                  ECHO: < from: TTE Echo Complete w/Doppler (03.08.19 @ 20:16) >  Summary:   1. Technically good study. Atrial fibrillation during the study.   2. Normal global left ventricular systolic function.   3. Left ventricular ejection fraction, by visual estimation, is 55 to   60%.   4. Severely enlarged left atrium.   5. The mitral in-flow pattern reveals no discernable A-wave, therefore   no comment on diastolic function can be made.   6. There is no left ventricular hypertrophy.   7. Trivial pericardial effusion.   8. Thickening ofthe anterior and posterior mitral valve leaflets.   9. Mild mitral valve regurgitation.  10. Bioprosthesis in the aortic position. S/P TAVR, Ben valve. Normal   acceleration time, DVI= 0.43. trace anterior para-valvular leak.  11. Pulmonic valve regurgitation.  12. Severe tricuspid regurgitation.    < end of copied text >        CARDIAC CATHETERIZATION:    < from: Cardiac Cath Lab - Adult (03.08.19 @ 10:41) >  PROCEDURE:  --  Naoot-mcyy-vnuylyoaa angiography.  --  Aortography.  --  Intubation-Non-cath Physician.  --  Temporary Pacemaker.  --  Cardiac Fluoro.  --  Transcatheter Aortic Valve Replacement.  Local anesthetic given. Left femoral vein access. Right femoral artery  access. Left femoral artery access. Oudcf-qedb-rievcbnmz angiography. A  catheter was positioned. Aortography. A catheter was placed and contrast  was injected. Intubation-Non-cath Physician. Temporary Pacemaker. Cardiac  Fluoro. RADIATION EXPOSURE: 10.3 min. Transcatheter Aortic Valve  Replacement.  CONTRAST GIVEN: Visipaque 45 ml.  COMPLICATIONS: There were no complications.  SUMMARY:  Summary: Addendum 4/15/2019: Case reconfirmed to associate images to DMS  report  DIAGNOSTIC IMPRESSIONS: Transfemoral TAVR w/ Bustos 23mm valve .  Aortography.  Transvenous pacemaker.  DIAGNOSTIC RECOMMENDATIONS: Admit to ICU.  Followup at [facility] in 4 weeks.  INTERVENTIONAL IMPRESSIONS: Transfemoral TAVR w/ Bustos 23mm valve .  Aortography.  Transvenous pacemaker.  INTERVENTIONAL RECOMMENDATIONS: Admitto ICU.  Followup at [facility] in 4 weeks.  Prepared and signed by  Ravi Valiente MD  Signed 04/15/2019 13:10:24  HEMODYNAMIC TABLES  Pressures:  Baseline  Pressures:  - HR: 100  Pressures:  - Rhythm:  Pressures:  -- Aortic Pressure (S/D/M): 121/63/85  Outputs:  Baseline  Outputs:  -- CALCULATIONS[age]  Outputs:  -- CALCULATIONS: Sex: Female    < end of copied text >      ACTIVE PROBLEMS:  HEALTH ISSUES - PROBLEM Dx:

## 2020-08-01 NOTE — PROGRESS NOTE ADULT - ASSESSMENT
Admitted from office with rapid AF hx of chronic AF S/P failed DCCV, CAD s/p PCI of RCA , S/P TAVR , RV failure her AF rate improved overnight   No CP less SOB overnight,       Telemetry monitoring  continue rate control AC  TTE today   will fup with you closely      DAVID AMANDA FACC, REYNA

## 2020-08-01 NOTE — PATIENT PROFILE ADULT - CHRONIC PAIN BODY LOCATION
Thank you for choosing [facility] for your medical care. The primary doctor who is taking care of you at the time of your discharge is Arcadio Velazquez MD.     You were admitted to the hospital with Acute on chronic diastolic heart failure.     Please note your discharge instructions, including diet/activity restrictions, follow-up appointments, and medication changes.  If you have any questions about your medical issues, prescriptions, or any other questions, please feel free to contact the [facility] Medicine Dept at [phone] and we will help.    If you are previously with Home health, outpatient PT/OT or under a therapy program, you are cleared to return to those programs.    Please direct all long term medication refills and follow up to your primary care provider, Gentry Encinas MD. Thank you again for letting us take care of your health care needs.       neck

## 2020-08-01 NOTE — PROGRESS NOTE ADULT - ASSESSMENT
84 y/o female pt with significant PMHx of A-fib (no longer considered a candidate for eliquis due to falls), CAD, HTN, SCC and Mitral Valve Disease who presents from cardiologists office after a scheduled routine visit in A-fib with RVR. also at home has had functional decline    #a-fib with RVR  - improved  - admit to tele  - metoprolol  - cardio consult pending  - not on AC due to falls  - echo pending     #weakness  - PT consult pending  - check UA  - tsh and b12 wnl    #cognitive decline  - likely onset of dementia  - patient follows with Dr. Atkins  - prescribed donepezil but had a bad reaction - no longer taking    #CAD  - aspirin    #HTN- essential  - spironolactone, lasix  - monitor blood pressure    #DVT prophylaxis  - venodynes 84 y/o female pt with significant PMHx of A-fib (no longer considered a candidate for eliquis due to falls), CAD, HTN, SCC and Mitral Valve Disease who presents from cardiologists office after a scheduled routine visit in A-fib with RVR. also at home has had functional decline    #a-fib with RVR  - improved  - metoprolol  - cardio consult pending  - not on AC due to falls  - echo pending     #weakness  - PT consult pending  - check UA  - tsh and b12 wnl    #cognitive decline  - likely onset of dementia  - patient follows with Dr. Atkins  - prescribed donepezil but had a bad reaction - no longer taking    #CAD  - aspirin    #HTN- essential  - spironolactone, lasix  - monitor blood pressure    #DVT prophylaxis  - venodynes

## 2020-08-01 NOTE — PROGRESS NOTE ADULT - SUBJECTIVE AND OBJECTIVE BOX
Patient is a 85y old  Female who presents with a chief complaint of     Patient seen and examined at bedside.     ALLERGIES:  No Known Allergies    MEDICATIONS  (STANDING):  furosemide    Tablet 40 milliGRAM(s) Oral two times a day  metoprolol tartrate 100 milliGRAM(s) Oral two times a day  spironolactone 25 milliGRAM(s) Oral daily    MEDICATIONS  (PRN):  acetaminophen   Tablet .. 650 milliGRAM(s) Oral every 6 hours PRN Mild Pain (1 - 3), Moderate Pain (4 - 6)    Vital Signs Last 24 Hrs  T(F): 97.6 (01 Aug 2020 10:55), Max: 98.4 (31 Jul 2020 18:39)  HR: 87 (01 Aug 2020 10:55) (58 - 121)  BP: 123/70 (01 Aug 2020 10:55) (101/68 - 130/95)  RR: 20 (01 Aug 2020 10:55) (19 - 20)  SpO2: 95% (01 Aug 2020 10:55) (91% - 98%)  I&O's Summary    PHYSICAL EXAM:  General: NAD, Alert  ENT: MMM, no thrush  Neck: Supple, No JVD  Lungs: Clear to auscultation bilaterally, good air entry, non-labored breathing  Cardio: +s1/s2; +irregular  Abdomen: Soft, Nontender, Nondistended; Bowel sounds present  Extremities: No calf tenderness    LABS:                        15.9   11.72 )-----------( 268      ( 31 Jul 2020 14:05 )             48.0     07-31    132  |  97  |  20.0  ----------------------------<  112  4.6   |  24.0  |  0.66    Ca    9.9      31 Jul 2020 14:05  Mg     2.3     07-31    TPro  7.2  /  Alb  3.7  /  TBili  1.0  /  DBili  x   /  AST  22  /  ALT  14  /  AlkPhos  124  07-31    eGFR if : 94 mL/min/1.73M2 (07-31-20 @ 14:05)  eGFR if Non African American: 81 mL/min/1.73M2 (07-31-20 @ 14:05)    CARDIAC MARKERS ( 31 Jul 2020 14:05 )  x     / 0.04 ng/mL / x     / x     / x        TSH 4.04   TSH with FT4 reflex --  Total T3 --    RADIOLOGY & ADDITIONAL TESTS:  - no new tests    Care Discussed with Consultants/Other Providers:   Cardiology

## 2020-08-01 NOTE — CHART NOTE - NSCHARTNOTEFT_GEN_A_CORE
called and spoke to patient daughter Twyla [phone] hospital course to date reviewed. all questions answered

## 2020-08-02 LAB
ANION GAP SERPL CALC-SCNC: 14 MMOL/L — SIGNIFICANT CHANGE UP (ref 5–17)
BUN SERPL-MCNC: 22 MG/DL — HIGH (ref 8–20)
CALCIUM SERPL-MCNC: 9.6 MG/DL — SIGNIFICANT CHANGE UP (ref 8.6–10.2)
CHLORIDE SERPL-SCNC: 95 MMOL/L — LOW (ref 98–107)
CO2 SERPL-SCNC: 27 MMOL/L — SIGNIFICANT CHANGE UP (ref 22–29)
CREAT SERPL-MCNC: 0.72 MG/DL — SIGNIFICANT CHANGE UP (ref 0.5–1.3)
GLUCOSE SERPL-MCNC: 99 MG/DL — SIGNIFICANT CHANGE UP (ref 70–99)
HCT VFR BLD CALC: 47.7 % — HIGH (ref 34.5–45)
HGB BLD-MCNC: 15.7 G/DL — HIGH (ref 11.5–15.5)
MAGNESIUM SERPL-MCNC: 2.2 MG/DL — SIGNIFICANT CHANGE UP (ref 1.6–2.6)
MCHC RBC-ENTMCNC: 29.8 PG — SIGNIFICANT CHANGE UP (ref 27–34)
MCHC RBC-ENTMCNC: 32.9 GM/DL — SIGNIFICANT CHANGE UP (ref 32–36)
MCV RBC AUTO: 90.7 FL — SIGNIFICANT CHANGE UP (ref 80–100)
PHOSPHATE SERPL-MCNC: 2.8 MG/DL — SIGNIFICANT CHANGE UP (ref 2.4–4.7)
PLATELET # BLD AUTO: 250 K/UL — SIGNIFICANT CHANGE UP (ref 150–400)
POTASSIUM SERPL-MCNC: 4 MMOL/L — SIGNIFICANT CHANGE UP (ref 3.5–5.3)
POTASSIUM SERPL-SCNC: 4 MMOL/L — SIGNIFICANT CHANGE UP (ref 3.5–5.3)
RBC # BLD: 5.26 M/UL — HIGH (ref 3.8–5.2)
RBC # FLD: 14.2 % — SIGNIFICANT CHANGE UP (ref 10.3–14.5)
SODIUM SERPL-SCNC: 136 MMOL/L — SIGNIFICANT CHANGE UP (ref 135–145)
WBC # BLD: 9.16 K/UL — SIGNIFICANT CHANGE UP (ref 3.8–10.5)
WBC # FLD AUTO: 9.16 K/UL — SIGNIFICANT CHANGE UP (ref 3.8–10.5)

## 2020-08-02 PROCEDURE — 99233 SBSQ HOSP IP/OBS HIGH 50: CPT

## 2020-08-02 RX ADMIN — QUETIAPINE FUMARATE 12.5 MILLIGRAM(S): 200 TABLET, FILM COATED ORAL at 21:04

## 2020-08-02 RX ADMIN — Medication 5 MILLIGRAM(S): at 17:43

## 2020-08-02 RX ADMIN — Medication 40 MILLIGRAM(S): at 17:33

## 2020-08-02 RX ADMIN — SPIRONOLACTONE 25 MILLIGRAM(S): 25 TABLET, FILM COATED ORAL at 05:34

## 2020-08-02 RX ADMIN — Medication 650 MILLIGRAM(S): at 18:02

## 2020-08-02 RX ADMIN — Medication 40 MILLIGRAM(S): at 05:34

## 2020-08-02 RX ADMIN — Medication 650 MILLIGRAM(S): at 17:32

## 2020-08-02 RX ADMIN — Medication 100 MILLIGRAM(S): at 17:33

## 2020-08-02 RX ADMIN — Medication 100 MILLIGRAM(S): at 05:34

## 2020-08-02 NOTE — PROGRESS NOTE ADULT - ASSESSMENT
86 y/o female pt with significant PMHx of A-fib (no longer considered a candidate for eliquis due to falls), CAD, HTN, SCC and Mitral Valve Disease who presents from cardiologists office after a scheduled routine visit in A-fib with RVR. also at home has had functional decline    #a-fib with RVR  - improved  - metoprolol  - cardio consult appreciated   - not on AC due to falls  - echo as above     #weakness  - PT consult pending  - check UA  - tsh and b12 wnl    #cognitive decline  - likely onset of dementia  - patient follows with Dr. Atkins  - prescribed donepezil but had a bad reaction - no longer taking    #insomnia   - quetiapine    #CAD  - aspirin    #HTN- essential  - spironolactone, lasix  - monitor blood pressure    #DVT prophylaxis  - venodynes    attempted to call patient son Kei [phone] and daughter elizabeth [phone] no answer

## 2020-08-02 NOTE — PROGRESS NOTE ADULT - SUBJECTIVE AND OBJECTIVE BOX
Patient is a 85y old  Female who presents with a chief complaint of AF (02 Aug 2020 11:43)    Patient seen and examined at bedside.     ALLERGIES:  No Known Allergies    MEDICATIONS  (STANDING):  furosemide    Tablet 40 milliGRAM(s) Oral two times a day  metoprolol tartrate 100 milliGRAM(s) Oral two times a day  QUEtiapine 12.5 milliGRAM(s) Oral at bedtime  spironolactone 25 milliGRAM(s) Oral daily    MEDICATIONS  (PRN):  acetaminophen   Tablet .. 650 milliGRAM(s) Oral every 6 hours PRN Mild Pain (1 - 3), Moderate Pain (4 - 6)  metoprolol tartrate Injectable 5 milliGRAM(s) IV Push every 6 hours PRN hold if sbp <100 or hr <55    Vital Signs Last 24 Hrs  T(F): 97.5 (02 Aug 2020 08:33), Max: 98.9 (01 Aug 2020 20:06)  HR: 97 (02 Aug 2020 08:33) (73 - 141)  BP: 121/63 (02 Aug 2020 08:33) (121/63 - 139/90)  RR: 19 (02 Aug 2020 08:33) (17 - 21)  SpO2: 94% (02 Aug 2020 08:33) (94% - 99%)  I&O's Summary    01 Aug 2020 07:01  -  02 Aug 2020 07:00  --------------------------------------------------------  IN: 240 mL / OUT: 500 mL / NET: -260 mL    PHYSICAL EXAM:  General: NAD, Alert  ENT: MMM, no thrush  Neck: Supple, No JVD  Lungs: Clear to auscultation bilaterally, good air entry, non-labored breathing  Cardio: +s1/s2; +irregular  Abdomen: Soft, Nontender, Nondistended; Bowel sounds present  Extremities: No calf tenderness    LABS:                        15.7   9.16  )-----------( 250      ( 02 Aug 2020 06:18 )             47.7     08-02    136  |  95  |  22.0  ----------------------------<  99  4.0   |  27.0  |  0.72    Ca    9.6      02 Aug 2020 06:18  Phos  2.8     08-02  Mg     2.2     08-02    TPro  7.2  /  Alb  3.7  /  TBili  1.0  /  DBili  x   /  AST  22  /  ALT  14  /  AlkPhos  124  07-        eGFR if Non African American: 77 mL/min/1.73M2 (20 @ 06:18)  eGFR if : 89 mL/min/1.73M2 (20 @ 06:18)    CARDIAC MARKERS ( 2020 14:05 )  x     / 0.04 ng/mL / x     / x     / x        TSH 4.04   TSH with FT4 reflex --  Total T3 --    Urinalysis Basic - ( 01 Aug 2020 18:20 )  Color: Yellow / Appearance: Clear / S.015 / pH: x  Gluc: x / Ketone: Negative  / Bili: Negative / Urobili: 1   Blood: x / Protein: 30 mg/dL / Nitrite: Positive   Leuk Esterase: Moderate / RBC: 0-2 /HPF / WBC >50   Sq Epi: x / Non Sq Epi: Moderate / Bacteria: Moderate    RADIOLOGY & ADDITIONAL TESTS:  - no new tests    < from: TTE Echo Complete w/o Contrast w/ Doppler (20 @ 13:29) >  Summary:   1. Left ventricular ejection fraction, by visual estimation, is 60 to 65%.   2. The mitral in-flow pattern reveals no discernable A-wave, therefore no comment on diastolic function can be made.   3. Thickening and calcification of the anterior and posterior mitral valve leaflets.   4. Moderate tricuspid regurgitation.   5. TAVR Ben valve in aortic position.   6. Estimated pulmonary artery systolic pressure is 42.1 mmHg assuming a right atrial pressure of 8 mmHg, which is consistent with mild pulmonary hypertension.   7. The aortic valve mean gradient is 9.3 mmHg consistent with normally opening aortic valve.  < end of copied text >

## 2020-08-02 NOTE — PROGRESS NOTE ADULT - ASSESSMENT
Admitted from office with rapid AF hx of chronic AF S/P failed DCCV, CAD s/p PCI of RCA , S/P TAVR , RV failure her AF rate improved overnight     < from: TTE Echo Complete w/o Contrast w/ Doppler (08.01.20 @ 13:29) >  Summary:   1. Left ventricular ejection fraction, by visual estimation, is 60 to 65%.   2. The mitral in-flow pattern reveals no discernable A-wave, therefore no comment on diastolic function can be made.   3. Thickening and calcification of the anterior and posterior mitral valve leaflets.   4. Moderate tricuspid regurgitation.   5. TAVR Ben valve in aortic position.   6. Estimated pulmonary artery systolic pressure is 42.1 mmHg assuming a right atrial pressure of 8 mmHg, which is consistent with mild pulmonary hypertension.   7. The aortic valve mean gradient is 9.3 mmHg consistent with normally opening aortic valve.    < end of copied text >          Telemetry monitoring  continue rate control AC  EP PM evaluation SSS  will fup with you closely      DAVID AMANDA FACC, REYNA

## 2020-08-02 NOTE — PROGRESS NOTE ADULT - SUBJECTIVE AND OBJECTIVE BOX
[facility], THE [facility]                                   [address] [place] [address]                                                      PHONE: [phone]                                                         FAX: [phone]  [email or web address]  ---------------------------------------------------------------------------------------------------------------------------------    Overnight events/patient complaints:     Admitted from office with rapid AF hx of chronic AF S/P failed DCCV, CAD s/p PCI of RCA , S/P TAVR , RV failure   Evidence of sick sinus syndrome in telemetry with rapid A. fib rate as well as episodes of low ventricular response  More confused this morning and one-to-one observation      PAST MEDICAL & SURGICAL HISTORY:  Atrial fibrillation  CAD (coronary artery disease)  Kyphoscoliosis  Aortic stenosis  SCC (squamous cell carcinoma)  Varicose vein of leg  Mitral valve disease  Hypertension  History of PTCA: one stent  After-cataract of both eyes  H/O colonoscopy  H/O varicose vein stripping    No Known Allergies    MEDICATIONS  (STANDING):  furosemide    Tablet 40 milliGRAM(s) Oral two times a day  metoprolol tartrate 100 milliGRAM(s) Oral two times a day  spironolactone 25 milliGRAM(s) Oral daily    MEDICATIONS  (PRN):  acetaminophen   Tablet .. 650 milliGRAM(s) Oral every 6 hours PRN Mild Pain (1 - 3), Moderate Pain (4 - 6)      Vital Signs Last 24 Hrs  T(C): 36.4 (01 Aug 2020 10:55), Max: 36.9 (31 Jul 2020 18:39)  T(F): 97.6 (01 Aug 2020 10:55), Max: 98.4 (31 Jul 2020 18:39)  HR: 87 (01 Aug 2020 10:55) (75 - 104)  BP: 123/70 (01 Aug 2020 10:55) (101/68 - 130/94)  BP(mean): --  RR: 20 (01 Aug 2020 10:55) (19 - 20)  SpO2: 95% (01 Aug 2020 10:55) (91% - 97%)  ICU Vital Signs Last 24 Hrs  SHERMAN BLACKWELL  I&O's Detail    I&O's Summary    Drug Dosing Weight  SHERMAN BLACKWELL    REVIEW OF SYSTEMS:    Constitutional: No fever, weight loss or fatigue  Eyes: No eye pain, visual disturbances, or discharge  ENMT:  No difficulty hearing, tinnitus, vertigo; No sinus or throat pain  Neck: No pain or stiffness  Respiratory: No cough, wheezing, chills or hemoptysis  Cardiovascular: No chest pain, palpitations, shortness of breath, dizziness or leg swelling  Gastrointestinal: No abdominal or epigastric pain. No nausea, vomiting or hematemesis; No diarrhea or constipation. No melena or hematochezia.  Genitourinary: No dysuria, frequency, hematuria or incontinence  Rectal: No pain, hemorrhoids or incontinence  Neurological: No headaches, memory loss, loss of strength, numbness or tremors  Skin: No itching, burning, rashes or lesions   Lymph Nodes: No enlarged glands  Endocrine: No heat or cold intolerance; No hair loss  Musculoskeletal: No joint pain or swelling; No muscle, back or extremity pain  Psychiatric: No depression, anxiety, mood swings or difficulty sleeping  Heme/Lymph: No easy bruising or bleeding gums  Allergy and Immunologic: No hives or eczema    PHYSICAL EXAM:  General: Appears well developed, well nourished alert and cooperative.  HEENT: Head; normocephalic, atraumatic.  Eyes: Pupils reactive, cornea wnl.  Neck: Supple, no nodes adenopathy, no NVD or carotid bruit or thyromegaly.  CARDIOVASCULAR: Normal S1 and S2, No murmur, rub, gallop or lift.   LUNGS: No rales, rhonchi or wheeze. Normal breath sounds bilaterally.  ABDOMEN: Soft, nontender without mass or organomegaly. bowel sounds normoactive.  EXTREMITIES: No clubbing, cyanosis or edema. Distal pulses wnl.   SKIN: warm and dry with normal turgor.  NEURO: Alert/oriented x 3/normal motor exam. No pathologic reflexes.    PSYCH: normal affect.        LABS:                        15.9   11.72 )-----------( 268      ( 31 Jul 2020 14:05 )             48.0     07-31    132<L>  |  97<L>  |  20.0  ----------------------------<  112<H>  4.6   |  24.0  |  0.66    Ca    9.9      31 Jul 2020 14:05  Mg     2.3     07-31    TPro  7.2  /  Alb  3.7  /  TBili  1.0  /  DBili  x   /  AST  22  /  ALT  14  /  AlkPhos  124<H>  07-31    SHERMAN BLACKWELL  CARDIAC MARKERS ( 31 Jul 2020 14:05 )  x     / 0.04 ng/mL / x     / x     / x                  ECHO: < from: TTE Echo Complete w/Doppler (03.08.19 @ 20:16) >  Summary:   1. Technically good study. Atrial fibrillation during the study.   2. Normal global left ventricular systolic function.   3. Left ventricular ejection fraction, by visual estimation, is 55 to   60%.   4. Severely enlarged left atrium.   5. The mitral in-flow pattern reveals no discernable A-wave, therefore   no comment on diastolic function can be made.   6. There is no left ventricular hypertrophy.   7. Trivial pericardial effusion.   8. Thickening ofthe anterior and posterior mitral valve leaflets.   9. Mild mitral valve regurgitation.  10. Bioprosthesis in the aortic position. S/P TAVR, Ben valve. Normal   acceleration time, DVI= 0.43. trace anterior para-valvular leak.  11. Pulmonic valve regurgitation.  12. Severe tricuspid regurgitation.    < end of copied text >        CARDIAC CATHETERIZATION:    < from: Cardiac Cath Lab - Adult (03.08.19 @ 10:41) >  PROCEDURE:  --  [id number] angiography.  --  Aortography.  --  Intubation-Non-cath Physician.  --  Temporary Pacemaker.  --  Cardiac Fluoro.  --  Transcatheter Aortic Valve Replacement.  Local anesthetic given. Left femoral vein access. Right femoral artery  access. Left femoral artery access. [id number] angiography. A  catheter was positioned. Aortography. A catheter was placed and contrast  was injected. Intubation-Non-cath Physician. Temporary Pacemaker. Cardiac  Fluoro. RADIATION EXPOSURE: 10.3 min. Transcatheter Aortic Valve  Replacement.  CONTRAST GIVEN: Visipaque 45 ml.  COMPLICATIONS: There were no complications.  SUMMARY:  Summary: Addendum 4/15/2019: Case reconfirmed to associate images to DMS  report  DIAGNOSTIC IMPRESSIONS: Transfemoral TAVR w/ Bustos 23mm valve .  Aortography.  Transvenous pacemaker.  DIAGNOSTIC RECOMMENDATIONS: Admit to ICU.  Followup at [facility] in 4 weeks.  INTERVENTIONAL IMPRESSIONS: Transfemoral TAVR w/ Bustos 23mm valve .  Aortography.  Transvenous pacemaker.  INTERVENTIONAL RECOMMENDATIONS: Admitto ICU.  Followup at [facility] in 4 weeks.  Prepared and signed by  Ravi Valiente MD  Signed 04/15/2019 13:10:24  HEMODYNAMIC TABLES  Pressures:  Baseline  Pressures:  - HR: 100  Pressures:  - Rhythm:  Pressures:  -- Aortic Pressure (S/D/M): 121/63/85  Outputs:  Baseline  Outputs:  -- CALCULATIONS[age]  Outputs:  -- CALCULATIONS: Sex: Female    < end of copied text >      ACTIVE PROBLEMS:  HEALTH ISSUES - PROBLEM Dx:

## 2020-08-03 PROCEDURE — 72052 X-RAY EXAM NECK SPINE 6/>VWS: CPT | Mod: 26

## 2020-08-03 PROCEDURE — 99233 SBSQ HOSP IP/OBS HIGH 50: CPT

## 2020-08-03 RX ORDER — LIDOCAINE 4 G/100G
1 CREAM TOPICAL DAILY
Refills: 0 | Status: DISCONTINUED | OUTPATIENT
Start: 2020-08-03 | End: 2020-08-06

## 2020-08-03 RX ORDER — ENOXAPARIN SODIUM 100 MG/ML
40 INJECTION SUBCUTANEOUS DAILY
Refills: 0 | Status: DISCONTINUED | OUTPATIENT
Start: 2020-08-03 | End: 2020-08-06

## 2020-08-03 RX ORDER — ASPIRIN/CALCIUM CARB/MAGNESIUM 324 MG
81 TABLET ORAL DAILY
Refills: 0 | Status: DISCONTINUED | OUTPATIENT
Start: 2020-08-03 | End: 2020-08-06

## 2020-08-03 RX ORDER — FUROSEMIDE 40 MG
40 TABLET ORAL DAILY
Refills: 0 | Status: DISCONTINUED | OUTPATIENT
Start: 2020-08-03 | End: 2020-08-04

## 2020-08-03 RX ORDER — DILTIAZEM HCL 120 MG
60 CAPSULE, EXT RELEASE 24 HR ORAL EVERY 8 HOURS
Refills: 0 | Status: DISCONTINUED | OUTPATIENT
Start: 2020-08-03 | End: 2020-08-04

## 2020-08-03 RX ORDER — CEFTRIAXONE 500 MG/1
1000 INJECTION, POWDER, FOR SOLUTION INTRAMUSCULAR; INTRAVENOUS EVERY 24 HOURS
Refills: 0 | Status: DISCONTINUED | OUTPATIENT
Start: 2020-08-03 | End: 2020-08-06

## 2020-08-03 RX ADMIN — QUETIAPINE FUMARATE 12.5 MILLIGRAM(S): 200 TABLET, FILM COATED ORAL at 21:22

## 2020-08-03 RX ADMIN — Medication 650 MILLIGRAM(S): at 22:20

## 2020-08-03 RX ADMIN — Medication 650 MILLIGRAM(S): at 21:23

## 2020-08-03 RX ADMIN — Medication 100 MILLIGRAM(S): at 17:33

## 2020-08-03 RX ADMIN — Medication 40 MILLIGRAM(S): at 05:12

## 2020-08-03 RX ADMIN — Medication 40 MILLIGRAM(S): at 11:18

## 2020-08-03 RX ADMIN — Medication 100 MILLIGRAM(S): at 05:12

## 2020-08-03 RX ADMIN — Medication 60 MILLIGRAM(S): at 14:39

## 2020-08-03 RX ADMIN — CEFTRIAXONE 100 MILLIGRAM(S): 500 INJECTION, POWDER, FOR SOLUTION INTRAMUSCULAR; INTRAVENOUS at 14:38

## 2020-08-03 RX ADMIN — LIDOCAINE 1 PATCH: 4 CREAM TOPICAL at 21:22

## 2020-08-03 RX ADMIN — ENOXAPARIN SODIUM 40 MILLIGRAM(S): 100 INJECTION SUBCUTANEOUS at 11:19

## 2020-08-03 RX ADMIN — SPIRONOLACTONE 25 MILLIGRAM(S): 25 TABLET, FILM COATED ORAL at 05:12

## 2020-08-03 RX ADMIN — Medication 81 MILLIGRAM(S): at 11:19

## 2020-08-03 RX ADMIN — Medication 60 MILLIGRAM(S): at 21:22

## 2020-08-03 NOTE — PHYSICAL THERAPY INITIAL EVALUATION ADULT - ADDITIONAL COMMENTS
pt a poor historian: states she lives c her grandparents, owns a RW and ambulates independently. as per medical chart pt lives c daughter

## 2020-08-03 NOTE — PROGRESS NOTE ADULT - SUBJECTIVE AND OBJECTIVE BOX
[facility], [facility]                                   [address] [place] [address]                                                      PHONE: [phone]                                                         FAX: [phone]  [email or web address]  ---------------------------------------------------------------------------------------------------------------------------------    Overnight events/patient complaints: lying flat w/o complaints, AF on tele consistently tachy no sig pauses, echo post TAVR with excellent result      No Known Allergies    MEDICATIONS  (STANDING):  enoxaparin Injectable 40 milliGRAM(s) SubCutaneous daily  furosemide    Tablet 40 milliGRAM(s) Oral two times a day  metoprolol tartrate 100 milliGRAM(s) Oral two times a day  QUEtiapine 12.5 milliGRAM(s) Oral at bedtime  spironolactone 25 milliGRAM(s) Oral daily    MEDICATIONS  (PRN):  acetaminophen   Tablet .. 650 milliGRAM(s) Oral every 6 hours PRN Mild Pain (1 - 3), Moderate Pain (4 - 6)  metoprolol tartrate Injectable 5 milliGRAM(s) IV Push every 6 hours PRN hold if sbp <100 or hr <55      Vital Signs Last 24 Hrs  T(C): 36.9 (02 Aug 2020 21:00), Max: 37.1 (02 Aug 2020 15:03)  T(F): 98.4 (02 Aug 2020 21:00), Max: 98.7 (02 Aug 2020 15:03)  HR: 93 (03 Aug 2020 05:04) (93 - 150)  BP: 126/65 (03 Aug 2020 05:04) (118/77 - 150/90)  BP(mean): --  RR: 18 (02 Aug 2020 21:00) (18 - 19)  SpO2: 97% (02 Aug 2020 23:12) (97% - 98%)  Daily     Daily Weight in k.9 (03 Aug 2020 06:00)  ICU Vital Signs Last 24 Hrs  SHERMAN BLACKWELL  I&O's Detail    02 Aug 2020 07:01  -  03 Aug 2020 07:00  --------------------------------------------------------  IN:    Oral Fluid: 340 mL  Total IN: 340 mL    OUT:    Voided: 1100 mL  Total OUT: 1100 mL    Total NET: -760 mL        I&O's Summary    02 Aug 2020 07:01  -  03 Aug 2020 07:00  --------------------------------------------------------  IN: 340 mL / OUT: 1100 mL / NET: -760 mL      Drug Dosing Weight  SHERMAN BLACKWELL      PHYSICAL EXAM:  General: Appears well developed, well nourished alert and cooperative.  HEENT: Head; normocephalic, atraumatic.  Eyes: Pupils reactive, cornea wnl.  Neck: Supple, no nodes adenopathy, no NVD or carotid bruit or thyromegaly.  CARDIOVASCULAR: Normal S1 and S2, No murmur, rub, gallop or lift.   LUNGS: No rales, rhonchi or wheeze. Normal breath sounds bilaterally.  ABDOMEN: Soft, nontender without mass or organomegaly. bowel sounds normoactive.  EXTREMITIES: No clubbing, cyanosis or edema. Distal pulses wnl.   SKIN: warm and dry with normal turgor.  NEURO: Alert/oriented x 3/normal motor exam. No pathologic reflexes.    PSYCH: normal affect.        LABS:                        15.7   9.16  )-----------( 250      ( 02 Aug 2020 06:18 )             47.7     08-02    136  |  95<L>  |  22.0<H>  ----------------------------<  99  4.0   |  27.0  |  0.72    Ca    9.6      02 Aug 2020 06:18  Phos  2.8     08-02  Mg     2.2     08-02      SHERMAN ROSALVA        Urinalysis Basic - ( 01 Aug 2020 18:20 )    Color: Yellow / Appearance: Clear / S.015 / pH: x  Gluc: x / Ketone: Negative  / Bili: Negative / Urobili: 1   Blood: x / Protein: 30 mg/dL / Nitrite: Positive   Leuk Esterase: Moderate / RBC: 0-2 /HPF / WBC >50   Sq Epi: x / Non Sq Epi: Moderate / Bacteria: Moderate        RADIOLOGY & ADDITIONAL STUDIES:    INTERPRETATION OF TELEMETRY (personally reviewed):    ECG:< from: 12 Lead ECG (20 @ 23:32) >    Diagnosis Line *** Poor data quality, interpretation may be adversely affected  Atrial fibrillation  RSR' or QR pattern in V1 suggests right ventricular conduction delay  Nonspecific T wave abnormality  Abnormal ECG    Confirmed by Norman Houston ([id number]) on 2020 11:20:42 PM    < end of copied text >      ECHO:< from: TTE Echo Complete w/o Contrast w/ Doppler (20 @ 13:29) >    Summary:   1. Left ventricular ejection fraction, by visual estimation, is 60 to 65%.   2. The mitral in-flow pattern reveals no discernable A-wave, therefore no comment on diastolic function can be made.   3. Thickening and calcification of the anterior and posterior mitral valve leaflets.   4. Moderate tricuspid regurgitation.   5. TAVR Ben valve in aortic position.   6. Estimated pulmonary artery systolic pressure is 42.1 mmHg assuming a right atrial pressure of 8 mmHg, which is consistent with mild pulmonary hypertension.   7. The aortic valve mean gradient is 9.3 mmHg consistent with normally opening aortic valve.    MD Izzy Electronically signed on 2020 at 7:11:01 PM              < end of copied text >

## 2020-08-03 NOTE — PROGRESS NOTE ADULT - ASSESSMENT
86 y/o female pt with significant PMHx of A-fib (no longer considered a candidate for eliquis due to falls), CAD, HTN, SCC and Mitral Valve Disease who presents from cardiologists office after a scheduled routine visit in A-fib with RVR. also at home has had functional decline    #a-fib with RVR  - improved  - metoprolol diltiazem  - cardio consult appreciated   - not on AC due to falls  - echo as above     #weakness  - PT consult pending  - tsh and b12 wnl    #UTI  - ceftriaxone x 5 days on d/c can transition to ceftin    #cognitive decline  - likely onset of dementia  - patient follows with Dr. Atkins  - prescribed donepezil but had a bad reaction - no longer taking    #insomnia   - quetiapine    #CAD  - aspirin    #HTN- essential  - spironolactone, lasix, metoprolol and diltiazem  - monitor blood pressure    #DVT prophylaxis  [facility]    hospital course to date reviewed with daughter elizabeth [phone]-> concerned for neck pain-> advised patient ct c-spine no acute fracture; xray read pending from AM will start lidocaine patch advised also patient with uti will start antibiotics 86 y/o female pt with significant PMHx of A-fib (no longer considered a candidate for eliquis due to falls), CAD, HTN, SCC and Mitral Valve Disease who presents from cardiologists office after a scheduled routine visit in A-fib with RVR. also at home has had functional decline    #a-fib with RVR  - improved  - metoprolol diltiazem  - cardio consult appreciated   - not on AC due to falls  - echo as above     #neck pain   - lidocaine patch  - xray c-spine pending     #weakness  - PT consult pending  - tsh and b12 wnl    #UTI  - ceftriaxone x 5 days on d/c can transition to ceftin    #cognitive decline  - likely onset of dementia  - patient follows with Dr. Atkins  - prescribed donepezil but had a bad reaction - no longer taking    #insomnia   - quetiapine    #CAD  - aspirin    #HTN- essential  - spironolactone, lasix, metoprolol and diltiazem  - monitor blood pressure    #DVT prophylaxis  - venodynes    hospital course to date reviewed with daughter elizabeth [phone]-> concerned for neck pain-> advised patient ct c-spine no acute fracture; xray read pending from AM will start lidocaine patch advised also patient with uti will start antibiotics

## 2020-08-03 NOTE — PHYSICAL THERAPY INITIAL EVALUATION ADULT - CRITERIA FOR SKILLED THERAPEUTIC INTERVENTIONS
rehab potential/predicted duration of therapy intervention/impairments found/functional limitations in following categories/therapy frequency/anticipated discharge recommendation

## 2020-08-03 NOTE — PROGRESS NOTE ADULT - ASSESSMENT
Assessment  s/p falls w/o fractures  Chronic AF with poor rate control NOT candidate for AC at this time  H/O HFpEF presently volume depleted  s/p TAVR normal EF      Rec  decrease lasix 20 QD  add cardizem for rate control,  defer EP eval at this time no indication of sig tachybrady thus far  as in place of eliquis  ? short term rehab post DC Assessment  s/p falls w/o fractures  Chronic AF with poor rate control NOT candidate for AC at this time  H/O HFpEF presently volume depleted  s/p TAVR normal EF      Rec  decrease lasix 40 QD  add cardizem for rate control,  defer EP eval at this time no indication of sig tachybrady thus far  as in place of eliquis  ? short term rehab post DC

## 2020-08-03 NOTE — PROGRESS NOTE ADULT - SUBJECTIVE AND OBJECTIVE BOX
Patient is a 85y old  Female who presents with a chief complaint of AF (03 Aug 2020 09:44)    Patient seen and examined at bedside.     ALLERGIES:  No Known Allergies    MEDICATIONS  (STANDING):  aspirin enteric coated 81 milliGRAM(s) Oral daily  cefTRIAXone   IVPB 1000 milliGRAM(s) IV Intermittent every 24 hours  diltiazem    Tablet 60 milliGRAM(s) Oral every 8 hours  enoxaparin Injectable 40 milliGRAM(s) SubCutaneous daily  furosemide    Tablet 40 milliGRAM(s) Oral daily  lidocaine   Patch 1 Patch Transdermal daily  metoprolol tartrate 100 milliGRAM(s) Oral two times a day  QUEtiapine 12.5 milliGRAM(s) Oral at bedtime  spironolactone 25 milliGRAM(s) Oral daily    MEDICATIONS  (PRN):  acetaminophen   Tablet .. 650 milliGRAM(s) Oral every 6 hours PRN Mild Pain (1 - 3), Moderate Pain (4 - 6)  metoprolol tartrate Injectable 5 milliGRAM(s) IV Push every 6 hours PRN hold if sbp <100 or hr <55    Vital Signs Last 24 Hrs  T(F): 97.6 (03 Aug 2020 11:06), Max: 98.7 (02 Aug 2020 15:03)  HR: 120 (03 Aug 2020 11:06) (93 - 150)  BP: 117/89 (03 Aug 2020 11:06) (117/89 - 150/90)  RR: 18 (03 Aug 2020 11:06) (18 - 19)  SpO2: 97% (03 Aug 2020 11:06) (97% - 98%)  I&O's Summary    02 Aug 2020 07:  -  03 Aug 2020 07:00  --------------------------------------------------------  IN: 340 mL / OUT: 1100 mL / NET: -760 mL    03 Aug 2020 07:  -  03 Aug 2020 12:21  --------------------------------------------------------  IN: 0 mL / OUT: 175 mL / NET: -175 mL    General: NAD, Alert  ENT: MMM, no thrush; elderly  Neck: Supple, No JVD  Lungs: Clear to auscultation bilaterally, good air entry, non-labored breathing  Cardio: +s1/s2; +irregular  Abdomen: Soft, Nontender, Nondistended; Bowel sounds present  Extremities: No calf tenderness    LABS:                        15.7   9.16  )-----------( 250      ( 02 Aug 2020 06:18 )             47.7     08-    136  |  95  |  22.0  ----------------------------<  99  4.0   |  27.0  |  0.72    Ca    9.6      02 Aug 2020 06:18  Phos  2.8     08-  Mg     2.2     08-    TPro  7.2  /  Alb  3.7  /  TBili  1.0  /  DBili  x   /  AST  22  /  ALT  14  /  AlkPhos  124  07-31    eGFR if Non African American: 77 mL/min/1.73M2 (20 @ 06:18)  eGFR if : 89 mL/min/1.73M2 (20 @ 06:18)    CARDIAC MARKERS ( 2020 14:05 )  x     / 0.04 ng/mL / x     / x     / x        TSH 4.04   TSH with FT4 reflex --  Total T3 --    Urinalysis Basic - ( 01 Aug 2020 18:20 )  Color: Yellow / Appearance: Clear / S.015 / pH: x  Gluc: x / Ketone: Negative  / Bili: Negative / Urobili: 1   Blood: x / Protein: 30 mg/dL / Nitrite: Positive   Leuk Esterase: Moderate / RBC: 0-2 /HPF / WBC >50   Sq Epi: x / Non Sq Epi: Moderate / Bacteria: Moderate    RADIOLOGY & ADDITIONAL TESTS:  - no new tests    < from: TTE Echo Complete w/o Contrast w/ Doppler (20 @ 13:29) >  Summary:   1. Left ventricular ejection fraction, by visual estimation, is 60 to 65%.   2. The mitral in-flow pattern reveals no discernable A-wave, therefore no comment on diastolic function can be made.   3. Thickening and calcification of the anterior and posterior mitral valve leaflets.   4. Moderate tricuspid regurgitation.   5. TAVR Ben valve in aortic position.   6. Estimated pulmonary artery systolic pressure is 42.1 mmHg assuming a right atrial pressure of 8 mmHg, which is consistent with mild pulmonary hypertension.   7. The aortic valve mean gradient is 9.3 mmHg consistent with normally opening aortic valve.  < end of copied text >      Care Discussed with Consultants/Other Providers:   Cardiology Patient is a 85y old  Female who presents with a chief complaint of AF (03 Aug 2020 09:44)    Patient seen and examined at bedside.     ALLERGIES:  No Known Allergies    MEDICATIONS  (STANDING):  aspirin enteric coated 81 milliGRAM(s) Oral daily  cefTRIAXone   IVPB 1000 milliGRAM(s) IV Intermittent every 24 hours  diltiazem    Tablet 60 milliGRAM(s) Oral every 8 hours  enoxaparin Injectable 40 milliGRAM(s) SubCutaneous daily  furosemide    Tablet 40 milliGRAM(s) Oral daily  lidocaine   Patch 1 Patch Transdermal daily  metoprolol tartrate 100 milliGRAM(s) Oral two times a day  QUEtiapine 12.5 milliGRAM(s) Oral at bedtime  spironolactone 25 milliGRAM(s) Oral daily    MEDICATIONS  (PRN):  acetaminophen   Tablet .. 650 milliGRAM(s) Oral every 6 hours PRN Mild Pain (1 - 3), Moderate Pain (4 - 6)  metoprolol tartrate Injectable 5 milliGRAM(s) IV Push every 6 hours PRN hold if sbp <100 or hr <55    Vital Signs Last 24 Hrs  T(F): 97.6 (03 Aug 2020 11:06), Max: 98.7 (02 Aug 2020 15:03)  HR: 120 (03 Aug 2020 11:06) (93 - 150)  BP: 117/89 (03 Aug 2020 11:06) (117/89 - 150/90)  RR: 18 (03 Aug 2020 11:06) (18 - 19)  SpO2: 97% (03 Aug 2020 11:06) (97% - 98%)  I&O's Summary    02 Aug 2020 07:  -  03 Aug 2020 07:00  --------------------------------------------------------  IN: 340 mL / OUT: 1100 mL / NET: -760 mL    03 Aug 2020 07:  -  03 Aug 2020 12:21  --------------------------------------------------------  IN: 0 mL / OUT: 175 mL / NET: -175 mL    Physical Exam   General: NAD, Alert  ENT: MMM, no thrush; elderly  Neck: Supple, No JVD  Lungs: Clear to auscultation bilaterally, good air entry, non-labored breathing  Cardio: +s1/s2; +irregular  Abdomen: Soft, Nontender, Nondistended; Bowel sounds present  Extremities: No calf tenderness    LABS:                        15.7   9.16  )-----------( 250      ( 02 Aug 2020 06:18 )             47.7     08-    136  |  95  |  22.0  ----------------------------<  99  4.0   |  27.0  |  0.72    Ca    9.6      02 Aug 2020 06:18  Phos  2.8     08-  Mg     2.2     08-    TPro  7.2  /  Alb  3.7  /  TBili  1.0  /  DBili  x   /  AST  22  /  ALT  14  /  AlkPhos  124  07-31    eGFR if Non African American: 77 mL/min/1.73M2 (20 @ 06:18)  eGFR if : 89 mL/min/1.73M2 (20 @ 06:18)    CARDIAC MARKERS ( 2020 14:05 )  x     / 0.04 ng/mL / x     / x     / x        TSH 4.04   TSH with FT4 reflex --  Total T3 --    Urinalysis Basic - ( 01 Aug 2020 18:20 )  Color: Yellow / Appearance: Clear / S.015 / pH: x  Gluc: x / Ketone: Negative  / Bili: Negative / Urobili: 1   Blood: x / Protein: 30 mg/dL / Nitrite: Positive   Leuk Esterase: Moderate / RBC: 0-2 /HPF / WBC >50   Sq Epi: x / Non Sq Epi: Moderate / Bacteria: Moderate    RADIOLOGY & ADDITIONAL TESTS:  - no new tests    < from: TTE Echo Complete w/o Contrast w/ Doppler (20 @ 13:29) >  Summary:   1. Left ventricular ejection fraction, by visual estimation, is 60 to 65%.   2. The mitral in-flow pattern reveals no discernable A-wave, therefore no comment on diastolic function can be made.   3. Thickening and calcification of the anterior and posterior mitral valve leaflets.   4. Moderate tricuspid regurgitation.   5. TAVR Ben valve in aortic position.   6. Estimated pulmonary artery systolic pressure is 42.1 mmHg assuming a right atrial pressure of 8 mmHg, which is consistent with mild pulmonary hypertension.   7. The aortic valve mean gradient is 9.3 mmHg consistent with normally opening aortic valve.  < end of copied text >      Care Discussed with Consultants/Other Providers:   Cardiology

## 2020-08-04 LAB
ANION GAP SERPL CALC-SCNC: 13 MMOL/L — SIGNIFICANT CHANGE UP (ref 5–17)
BUN SERPL-MCNC: 30 MG/DL — HIGH (ref 8–20)
CALCIUM SERPL-MCNC: 9.1 MG/DL — SIGNIFICANT CHANGE UP (ref 8.6–10.2)
CHLORIDE SERPL-SCNC: 98 MMOL/L — SIGNIFICANT CHANGE UP (ref 98–107)
CO2 SERPL-SCNC: 26 MMOL/L — SIGNIFICANT CHANGE UP (ref 22–29)
CREAT SERPL-MCNC: 0.84 MG/DL — SIGNIFICANT CHANGE UP (ref 0.5–1.3)
GLUCOSE SERPL-MCNC: 90 MG/DL — SIGNIFICANT CHANGE UP (ref 70–99)
HCT VFR BLD CALC: 43.4 % — SIGNIFICANT CHANGE UP (ref 34.5–45)
HGB BLD-MCNC: 14.2 G/DL — SIGNIFICANT CHANGE UP (ref 11.5–15.5)
MCHC RBC-ENTMCNC: 29.6 PG — SIGNIFICANT CHANGE UP (ref 27–34)
MCHC RBC-ENTMCNC: 32.7 GM/DL — SIGNIFICANT CHANGE UP (ref 32–36)
MCV RBC AUTO: 90.4 FL — SIGNIFICANT CHANGE UP (ref 80–100)
PLATELET # BLD AUTO: 237 K/UL — SIGNIFICANT CHANGE UP (ref 150–400)
POTASSIUM SERPL-MCNC: 3.7 MMOL/L — SIGNIFICANT CHANGE UP (ref 3.5–5.3)
POTASSIUM SERPL-SCNC: 3.7 MMOL/L — SIGNIFICANT CHANGE UP (ref 3.5–5.3)
RBC # BLD: 4.8 M/UL — SIGNIFICANT CHANGE UP (ref 3.8–5.2)
RBC # FLD: 14.3 % — SIGNIFICANT CHANGE UP (ref 10.3–14.5)
SODIUM SERPL-SCNC: 137 MMOL/L — SIGNIFICANT CHANGE UP (ref 135–145)
WBC # BLD: 8.31 K/UL — SIGNIFICANT CHANGE UP (ref 3.8–10.5)
WBC # FLD AUTO: 8.31 K/UL — SIGNIFICANT CHANGE UP (ref 3.8–10.5)

## 2020-08-04 PROCEDURE — 99233 SBSQ HOSP IP/OBS HIGH 50: CPT

## 2020-08-04 RX ORDER — FUROSEMIDE 40 MG
40 TABLET ORAL DAILY
Refills: 0 | Status: DISCONTINUED | OUTPATIENT
Start: 2020-08-04 | End: 2020-08-04

## 2020-08-04 RX ORDER — SPIRONOLACTONE 25 MG/1
12.5 TABLET, FILM COATED ORAL DAILY
Refills: 0 | Status: DISCONTINUED | OUTPATIENT
Start: 2020-08-04 | End: 2020-08-06

## 2020-08-04 RX ORDER — DILTIAZEM HCL 120 MG
30 CAPSULE, EXT RELEASE 24 HR ORAL EVERY 6 HOURS
Refills: 0 | Status: DISCONTINUED | OUTPATIENT
Start: 2020-08-04 | End: 2020-08-06

## 2020-08-04 RX ORDER — SODIUM CHLORIDE 9 MG/ML
250 INJECTION INTRAMUSCULAR; INTRAVENOUS; SUBCUTANEOUS ONCE
Refills: 0 | Status: COMPLETED | OUTPATIENT
Start: 2020-08-04 | End: 2020-08-04

## 2020-08-04 RX ORDER — METOPROLOL TARTRATE 50 MG
100 TABLET ORAL
Refills: 0 | Status: DISCONTINUED | OUTPATIENT
Start: 2020-08-04 | End: 2020-08-06

## 2020-08-04 RX ADMIN — SODIUM CHLORIDE 250 MILLILITER(S): 9 INJECTION INTRAMUSCULAR; INTRAVENOUS; SUBCUTANEOUS at 02:08

## 2020-08-04 RX ADMIN — SPIRONOLACTONE 25 MILLIGRAM(S): 25 TABLET, FILM COATED ORAL at 06:03

## 2020-08-04 RX ADMIN — Medication 100 MILLIGRAM(S): at 16:59

## 2020-08-04 RX ADMIN — LIDOCAINE 1 PATCH: 4 CREAM TOPICAL at 11:18

## 2020-08-04 RX ADMIN — LIDOCAINE 1 PATCH: 4 CREAM TOPICAL at 07:00

## 2020-08-04 RX ADMIN — QUETIAPINE FUMARATE 12.5 MILLIGRAM(S): 200 TABLET, FILM COATED ORAL at 23:10

## 2020-08-04 RX ADMIN — Medication 81 MILLIGRAM(S): at 09:27

## 2020-08-04 RX ADMIN — Medication 30 MILLIGRAM(S): at 23:10

## 2020-08-04 RX ADMIN — LIDOCAINE 1 PATCH: 4 CREAM TOPICAL at 19:45

## 2020-08-04 RX ADMIN — LIDOCAINE 1 PATCH: 4 CREAM TOPICAL at 23:10

## 2020-08-04 RX ADMIN — Medication 40 MILLIGRAM(S): at 06:03

## 2020-08-04 RX ADMIN — Medication 100 MILLIGRAM(S): at 09:26

## 2020-08-04 RX ADMIN — Medication 30 MILLIGRAM(S): at 09:26

## 2020-08-04 RX ADMIN — ENOXAPARIN SODIUM 40 MILLIGRAM(S): 100 INJECTION SUBCUTANEOUS at 23:09

## 2020-08-04 RX ADMIN — LIDOCAINE 1 PATCH: 4 CREAM TOPICAL at 11:17

## 2020-08-04 RX ADMIN — CEFTRIAXONE 100 MILLIGRAM(S): 500 INJECTION, POWDER, FOR SOLUTION INTRAMUSCULAR; INTRAVENOUS at 14:12

## 2020-08-04 RX ADMIN — Medication 30 MILLIGRAM(S): at 16:59

## 2020-08-04 NOTE — PROGRESS NOTE ADULT - SUBJECTIVE AND OBJECTIVE BOX
Cardiology PA Note     A fib with RVR> a fib pauses   tachybrady syndrome      -- pt with a fib to 30's, with a fib pauses -- longest-- 2.54 sec,   -- BP- 82/53, pt is sleeping, asymptomatic,     Vital Signs Last 24 Hrs  T(C): 37.2 (03 Aug 2020 21:09), Max: 37.2 (03 Aug 2020 21:09)  T(F): 99 (03 Aug 2020 21:09), Max: 99 (03 Aug 2020 21:09)  HR: 57 (04 Aug 2020 02:04) (30 - 121)  BP: 82/53 (04 Aug 2020 01:52) (82/53 - 128/79)  BP(mean): --  RR: 18 (03 Aug 2020 21:09) (18 - 18)  SpO2: 96% (03 Aug 2020 21:09) (96% - 97%)    -- discontinued Cardizem 60 PO q 6 hrs, ( started yesterday-- 08/03)  -- cont Metoprolol 100 mg BID, added parameters, PRN lopressor IV with parameters   -- 250 cc bolus ordered, continue monitoring,   -- consider decreasing Lopressor PO if recurrent and longer pauses,   -- consider holding Seroquel ( new medication for pt) Cardiology PA Note     A fib with RVR> a fib pauses   tachybrady syndrome  Chronic AF with poor rate control NOT candidate for AC at this time  s/p falls w/o fractures      -- pt with a fib to 30's, with a fib pauses -- longest-- 2.54 sec,   -- BP- 82/53> 78/47, pt is sleeping, asymptomatic,   -- pt has been on Lopressor 100 mg BID, yesterday was started on Cardizem 60 q 6 hrs, and Lasix 40 PO daily,     Vital Signs Last 24 Hrs  T(C): 37.2 (03 Aug 2020 21:09), Max: 37.2 (03 Aug 2020 21:09)  T(F): 99 (03 Aug 2020 21:09), Max: 99 (03 Aug 2020 21:09)  HR: 57 (04 Aug 2020 02:04) (30 - 121)  BP: 82/53 (04 Aug 2020 01:52) (82/53 - 128/79)  BP(mean): --  RR: 18 (03 Aug 2020 21:09) (18 - 18)  SpO2: 96% (03 Aug 2020 21:09) (96% - 97%)    -- discontinued Cardizem 60 PO q 6 hrs, ( started yesterday-- 08/03)  -- cont Metoprolol 100 mg BID, added parameters, PRN lopressor IV with parameters   -- 250 cc bolus ordered, continue monitoring,   -- consider decreasing Lopressor PO if recurrent and longer pauses,   -- consider holding Seroquel ( new medication for pt)     -- repeat VS below,     Vital Signs Last 24 Hrs  T(C): 36.7 (04 Aug 2020 06:01), Max: 37.2 (03 Aug 2020 21:09)  T(F): 98 (04 Aug 2020 06:01), Max: 99 (03 Aug 2020 21:09)  HR: 76 (04 Aug 2020 06:01) (30 - 121)  BP: 112/77 (04 Aug 2020 06:01) (78/47 - 128/79)  BP(mean): --  RR: 16 (04 Aug 2020 06:01) (16 - 18)  SpO2: 98% (04 Aug 2020 06:01) (96% - 98%)    -- continue monitoring

## 2020-08-04 NOTE — PROGRESS NOTE ADULT - ASSESSMENT
will monitor in tele today  Parameters changed to SBP of 90 mm Hg  Will FUP VR today closely  Will need placement

## 2020-08-04 NOTE — PROGRESS NOTE ADULT - SUBJECTIVE AND OBJECTIVE BOX
Patient is a 85y old  Female who presents with a chief complaint of fall (04 Aug 2020 07:57)      Patient seen and examined at bedside. No overnight events reported.     ALLERGIES:  No Known Allergies    MEDICATIONS  (STANDING):  aspirin enteric coated 81 milliGRAM(s) Oral daily  cefTRIAXone   IVPB 1000 milliGRAM(s) IV Intermittent every 24 hours  diltiazem    Tablet 30 milliGRAM(s) Oral every 6 hours  enoxaparin Injectable 40 milliGRAM(s) SubCutaneous daily  lidocaine   Patch 1 Patch Transdermal daily  metoprolol tartrate 100 milliGRAM(s) Oral two times a day  QUEtiapine 12.5 milliGRAM(s) Oral at bedtime  spironolactone 12.5 milliGRAM(s) Oral daily    MEDICATIONS  (PRN):  acetaminophen   Tablet .. 650 milliGRAM(s) Oral every 6 hours PRN Mild Pain (1 - 3), Moderate Pain (4 - 6)  metoprolol tartrate Injectable 5 milliGRAM(s) IV Push every 6 hours PRN hold if sbp <100 or hr <55    Vital Signs Last 24 Hrs  T(F): 98 (04 Aug 2020 06:01), Max: 99 (03 Aug 2020 21:09)  HR: 76 (04 Aug 2020 06:01) (30 - 121)  BP: 112/77 (04 Aug 2020 06:01) (78/47 - 128/79)  RR: 16 (04 Aug 2020 06:01) (16 - 18)  SpO2: 98% (04 Aug 2020 06:01) (96% - 98%)  I&O's Summary    03 Aug 2020 07:01  -  04 Aug 2020 07:00  --------------------------------------------------------  IN: 855 mL / OUT: 1125 mL / NET: -270 mL      PHYSICAL EXAM:  General: NAD, Alert  ENT: MMM, no thrush; elderly  Neck: Supple, No JVD  Lungs: Clear to auscultation bilaterally, good air entry, non-labored breathing  Cardio: +s1/s2; +irregular  Abdomen: Soft, Nontender, Nondistended; Bowel sounds present  Extremities: No calf tenderness      LABS:                        14.2   8.31  )-----------( 237      ( 04 Aug 2020 06:10 )             43.4     08-04    137  |  98  |  30.0  ----------------------------<  90  3.7   |  26.0  |  0.84    Ca    9.1      04 Aug 2020 06:10  Phos  2.8     08-02  Mg     2.2     08-02    eGFR if Non African American: 64 mL/min/1.73M2 (20 @ 06:10)  eGFR if : 74 mL/min/1.73M2 (20 @ 06:10)    Urinalysis Basic - ( 01 Aug 2020 18:20 )  Color: Yellow / Appearance: Clear / S.015 / pH: x  Gluc: x / Ketone: Negative  / Bili: Negative / Urobili: 1   Blood: x / Protein: 30 mg/dL / Nitrite: Positive   Leuk Esterase: Moderate / RBC: 0-2 /HPF / WBC >50   Sq Epi: x / Non Sq Epi: Moderate / Bacteria: Moderate    RADIOLOGY & ADDITIONAL TESTS:  < from: Xray Cervical Spine Complete w Flex+Ext (20 @ 09:30) >  IMPRESSION: Severe osteopenia. No gross acute osseous abnormality. Moderate multilevel spondylosis.  < end of copied text >      Care Discussed with Consultants/Other Providers:   Cardiology

## 2020-08-04 NOTE — PROGRESS NOTE ADULT - SUBJECTIVE AND OBJECTIVE BOX
[place] [facility] - [facility], THE [facility]                                   [address] [place] [address]                                                      PHONE: [phone]                                                         FAX: [phone]  [email or web address]  ---------------------------------------------------------------------------------------------------------------------------------    Overnight events/patient complaints: tele reviewed, no further tachy, HR ~ 60-8- with occ 3 sec pause, lasix dc due to volume depletion and low BP      No Known Allergies    MEDICATIONS  (STANDING):  aspirin enteric coated 81 milliGRAM(s) Oral daily  cefTRIAXone   IVPB 1000 milliGRAM(s) IV Intermittent every 24 hours  diltiazem    Tablet 30 milliGRAM(s) Oral every 6 hours  enoxaparin Injectable 40 milliGRAM(s) SubCutaneous daily  lidocaine   Patch 1 Patch Transdermal daily  metoprolol tartrate 100 milliGRAM(s) Oral two times a day  QUEtiapine 12.5 milliGRAM(s) Oral at bedtime    MEDICATIONS  (PRN):  acetaminophen   Tablet .. 650 milliGRAM(s) Oral every 6 hours PRN Mild Pain (1 - 3), Moderate Pain (4 - 6)  metoprolol tartrate Injectable 5 milliGRAM(s) IV Push every 6 hours PRN hold if sbp <100 or hr <55      Vital Signs Last 24 Hrs  T(C): 36.7 (04 Aug 2020 06:01), Max: 37.2 (03 Aug 2020 21:09)  T(F): 98 (04 Aug 2020 06:01), Max: 99 (03 Aug 2020 21:09)  HR: 76 (04 Aug 2020 06:01) (30 - 121)  BP: 112/77 (04 Aug 2020 06:01) (78/47 - 128/79)  BP(mean): --  RR: 16 (04 Aug 2020 06:01) (16 - 18)  SpO2: 98% (04 Aug 2020 06:01) (96% - 98%)  Daily     Daily Weight in k.8 (04 Aug 2020 06:49)  ICU Vital Signs Last 24 Hrs  SHERMAN ROSALVA  I&O's Detail    03 Aug 2020 07:01  -  04 Aug 2020 07:00  --------------------------------------------------------  IN:    Oral Fluid: 605 mL    Sodium Chloride 0.9% IV Bolus: 250 mL  Total IN: 855 mL    OUT:    Voided: 1125 mL  Total OUT: 1125 mL    Total NET: -270 mL        I&O's Summary    03 Aug 2020 07:01  -  04 Aug 2020 07:00  --------------------------------------------------------  IN: 855 mL / OUT: 1125 mL / NET: -270 mL      Drug Dosing Weight  SHERMAN HIGGINBOTHAMONEY      PHYSICAL EXAM:  General: Appears well developed, well nourished alert and cooperative.  HEENT: Head; normocephalic, atraumatic.  Eyes: Pupils reactive, cornea wnl.  Neck: Supple, no nodes adenopathy, no NVD or carotid bruit or thyromegaly.  CARDIOVASCULAR: Normal S1 and S2, No murmur, rub, gallop or lift.   LUNGS: No rales, rhonchi or wheeze. Normal breath sounds bilaterally.  ABDOMEN: Soft, nontender without mass or organomegaly. bowel sounds normoactive.  EXTREMITIES: No clubbing, cyanosis or edema. Distal pulses wnl.   SKIN: warm and dry with normal turgor.  NEURO: Alert/oriented x 3/normal motor exam. No pathologic reflexes.    PSYCH: normal affect.        LABS:                        14.2   8.31  )-----------( 237      ( 04 Aug 2020 06:10 )             43.4     08-04    137  |  98  |  30.0<H>  ----------------------------<  90  3.7   |  26.0  |  0.84    Ca    9.1      04 Aug 2020 06:10      SHERMAN BLACKWELL            RADIOLOGY & ADDITIONAL STUDIES:    INTERPRETATION OF TELEMETRY (personally reviewed):    ECG:    ECHO:< from: 12 Lead ECG (20 @ 13:34) >    Diagnosis Line *** Poor data quality, interpretation may be adversely affected  Atrial fibrillation with rapid ventricular response  Abnormal ECG    Confirmed by Norman Houston ([id number]) on 8/3/2020 10:42:04 AM    < end of copied text >      < from: TTE Echo Complete w/o Contrast w/ Doppler (20 @ 13:29) >    Summary:   1. Left ventricular ejection fraction, by visual estimation, is 60 to 65%.   2. The mitral in-flow pattern reveals no discernable A-wave, therefore no comment on diastolic function can be made.   3. Thickening and calcification of the anterior and posterior mitral valve leaflets.   4. Moderate tricuspid regurgitation.   5. TAVR Ben valve in aortic position.   6. Estimated pulmonary artery systolic pressure is 42.1 mmHg assuming a right atrial pressure of 8 mmHg, which is consistent with mild pulmonary hypertension.   7. The aortic valve mean gradient is 9.3 mmHg consistent with normally opening aortic valve.    MD Izzy Electronically signed on 2020 at 7:11:01 PM            *** Final ***                < end of copied text >

## 2020-08-04 NOTE — PROGRESS NOTE ADULT - ASSESSMENT
86 y/o female pt with significant PMHx of A-fib (no longer considered a candidate for eliquis due to falls), CAD, HTN, SCC and Mitral Valve Disease who presents from cardiologists office after a scheduled routine [facility] in [facility] with RVR. also at home has had functional decline    Patient medications adjusted with cardiology Dr Alexis-> restart cardizem, decrease spironolactone to 12.5, d/c lasix; continue monitoring patient on tele for 24 hours possible d/c in AM to [facility]NACIA - daughter aware    #a-fib with RVR  - improved  - metoprolol diltiazem  - cardio consult appreciated   - not on AC due to falls  - echo as above     #neck pain   - lidocaine patch  - xray c-spine osteopenia    #weakness  - PT consult - IGNACIA  - tsh and b12 wnl    #UTI  - ceftriaxone x 5 days on d/c can transition to ceftin    #cognitive decline  - likely onset of dementia  - patient follows with Dr. Atkins  - prescribed donepezil but had a bad reaction - no longer taking    #insomnia   - quetiapine    #CAD  - aspirin    #HTN- essential  - s/p lasix  - spironolactone, metoprolol and diltiazem  - monitor blood pressure    #DVT prophylaxis  [facility]    hospital course to date reviewed with daughter elizabeth [phone]

## 2020-08-04 NOTE — PROGRESS NOTE ADULT - SUBJECTIVE AND OBJECTIVE BOX
[facility], THE HEART CENTER                                   [address] [place] [address]                                                      PHONE: [phone]                                                         FAX: [phone]  [email or web address]  ---------------------------------------------------------------------------------------------------------------------------------    Overnight events/patient complaints: No CP or SOB overnight  TELE AF rate elevated today although some meds were not given due to hypotension    PAST MEDICAL & SURGICAL HISTORY:  Atrial fibrillation  CAD (coronary artery disease)  Kyphoscoliosis  Aortic stenosis  SCC (squamous cell carcinoma)  Varicose vein of leg  Mitral valve disease  Hypertension  History of PTCA: one stent  After-cataract of both eyes  H/O colonoscopy  H/O varicose vein stripping    No Known Allergies    MEDICATIONS  (STANDING):  aspirin enteric coated 81 milliGRAM(s) Oral daily  cefTRIAXone   IVPB 1000 milliGRAM(s) IV Intermittent every 24 hours  diltiazem    Tablet 30 milliGRAM(s) Oral every 6 hours  enoxaparin Injectable 40 milliGRAM(s) SubCutaneous daily  lidocaine   Patch 1 Patch Transdermal daily  metoprolol tartrate 100 milliGRAM(s) Oral two times a day  QUEtiapine 12.5 milliGRAM(s) Oral at bedtime  spironolactone 12.5 milliGRAM(s) Oral daily    MEDICATIONS  (PRN):  acetaminophen   Tablet .. 650 milliGRAM(s) Oral every 6 hours PRN Mild Pain (1 - 3), Moderate Pain (4 - 6)  metoprolol tartrate Injectable 5 milliGRAM(s) IV Push every 6 hours PRN hold if sbp <100 or hr <55      Vital Signs Last 24 Hrs  T(C): 36.7 (04 Aug 2020 09:24), Max: 37.2 (03 Aug 2020 21:09)  T(F): 98 (04 Aug 2020 09:24), Max: 99 (03 Aug 2020 21:09)  HR: 120 (04 Aug 2020 09:24) (30 - 121)  BP: 100/67 (04 Aug 2020 09:24) (78/47 - 128/79)  BP(mean): --  RR: 16 (04 Aug 2020 09:24) (16 - 18)  SpO2: 95% (04 Aug 2020 09:24) (95% - 98%)  ICU Vital Signs Last 24 Hrs  SHERMAN BLACKWELL  I&O's Detail    03 Aug 2020 07:01  -  04 Aug 2020 07:00  --------------------------------------------------------  IN:    Oral Fluid: 605 mL    Sodium Chloride 0.9% IV Bolus: 250 mL  Total IN: 855 mL    OUT:    Voided: 1125 mL  Total OUT: 1125 mL    Total NET: -270 mL        I&O's Summary    03 Aug 2020 07:01  -  04 Aug 2020 07:00  --------------------------------------------------------  IN: 855 mL / OUT: 1125 mL / NET: -270 mL      Drug Dosing Weight  SHERMAN BLACKWELL    REVIEW OF SYSTEMS:    Constitutional: No fever, weight loss or fatigue  Eyes: No eye pain, visual disturbances, or discharge  ENMT:  No difficulty hearing, tinnitus, vertigo; No sinus or throat pain  Neck: No pain or stiffness  Respiratory: No cough, wheezing, chills or hemoptysis  Cardiovascular: No chest pain, palpitations, shortness of breath, dizziness or leg swelling  Gastrointestinal: No abdominal or epigastric pain. No nausea, vomiting or hematemesis; No diarrhea or constipation. No melena or hematochezia.  Genitourinary: No dysuria, frequency, hematuria or incontinence  Rectal: No pain, hemorrhoids or incontinence  Neurological: No headaches, memory loss, loss of strength, numbness or tremors  Skin: No itching, burning, rashes or lesions   Lymph Nodes: No enlarged glands  Endocrine: No heat or cold intolerance; No hair loss  Musculoskeletal: No joint pain or swelling; No muscle, back or extremity pain  Psychiatric: No depression, anxiety, mood swings or difficulty sleeping  Heme/Lymph: No easy bruising or bleeding gums  Allergy and Immunologic: No hives or eczema    PHYSICAL EXAM:  General: Appears well developed, well nourished alert and cooperative.  HEENT: Head; normocephalic, atraumatic.  Eyes: Pupils reactive, cornea wnl.  Neck: Supple, no nodes adenopathy, no NVD or carotid bruit or thyromegaly.  CARDIOVASCULAR: Normal S1 and S2, No murmur, rub, gallop or lift.   LUNGS: No rales, rhonchi or wheeze. Normal breath sounds bilaterally.  ABDOMEN: Soft, nontender without mass or organomegaly. bowel sounds normoactive.  EXTREMITIES: No clubbing, cyanosis or edema. Distal pulses wnl.   SKIN: warm and dry with normal turgor.  NEURO: Alert/oriented x 3/normal motor exam. No pathologic reflexes.    PSYCH: normal affect.        LABS:                        14.2   8.31  )-----------( 237      ( 04 Aug 2020 06:10 )             43.4     08-04    137  |  98  |  30.0<H>  ----------------------------<  90  3.7   |  26.0  |  0.84    Ca    9.1      04 Aug 2020 06:10      SHERMAN BLACKWELL            RADIOLOGY & ADDITIONAL STUDIES:    INTERPRETATION OF TELEMETRY (personally reviewed):    ECG:    ECHO:    STRESS TEST:    CARDIAC CATHETERIZATION:    ACTIVE PROBLEMS:  HEALTH ISSUES - PROBLEM Dx:

## 2020-08-04 NOTE — PROGRESS NOTE ADULT - ASSESSMENT
Assessment  gait instability/falls w/o sig trauma  chronic AF with improved rate, no evidence of tachy at this time, no indication for PPM  s/p TAVR normal EF  s/p PCI  chronic RHF resolved    Rec  agree with dc lasix  cont low dose aldactone  decrease cardizem 30 BID   cont lopressor  cont asa not candidate for AC  agree with placement as pt not able to care for herself at this time  ? dc in am

## 2020-08-05 LAB
ANION GAP SERPL CALC-SCNC: 11 MMOL/L — SIGNIFICANT CHANGE UP (ref 5–17)
BUN SERPL-MCNC: 29 MG/DL — HIGH (ref 8–20)
CALCIUM SERPL-MCNC: 9.5 MG/DL — SIGNIFICANT CHANGE UP (ref 8.6–10.2)
CHLORIDE SERPL-SCNC: 97 MMOL/L — LOW (ref 98–107)
CO2 SERPL-SCNC: 29 MMOL/L — SIGNIFICANT CHANGE UP (ref 22–29)
CREAT SERPL-MCNC: 0.81 MG/DL — SIGNIFICANT CHANGE UP (ref 0.5–1.3)
GLUCOSE SERPL-MCNC: 87 MG/DL — SIGNIFICANT CHANGE UP (ref 70–99)
HCT VFR BLD CALC: 46.6 % — HIGH (ref 34.5–45)
HGB BLD-MCNC: 14.9 G/DL — SIGNIFICANT CHANGE UP (ref 11.5–15.5)
MCHC RBC-ENTMCNC: 28.8 PG — SIGNIFICANT CHANGE UP (ref 27–34)
MCHC RBC-ENTMCNC: 32 GM/DL — SIGNIFICANT CHANGE UP (ref 32–36)
MCV RBC AUTO: 90 FL — SIGNIFICANT CHANGE UP (ref 80–100)
PLATELET # BLD AUTO: 266 K/UL — SIGNIFICANT CHANGE UP (ref 150–400)
POTASSIUM SERPL-MCNC: 3.9 MMOL/L — SIGNIFICANT CHANGE UP (ref 3.5–5.3)
POTASSIUM SERPL-SCNC: 3.9 MMOL/L — SIGNIFICANT CHANGE UP (ref 3.5–5.3)
RBC # BLD: 5.18 M/UL — SIGNIFICANT CHANGE UP (ref 3.8–5.2)
RBC # FLD: 14 % — SIGNIFICANT CHANGE UP (ref 10.3–14.5)
SODIUM SERPL-SCNC: 137 MMOL/L — SIGNIFICANT CHANGE UP (ref 135–145)
WBC # BLD: 7.18 K/UL — SIGNIFICANT CHANGE UP (ref 3.8–10.5)
WBC # FLD AUTO: 7.18 K/UL — SIGNIFICANT CHANGE UP (ref 3.8–10.5)

## 2020-08-05 PROCEDURE — 99233 SBSQ HOSP IP/OBS HIGH 50: CPT

## 2020-08-05 RX ADMIN — LIDOCAINE 1 PATCH: 4 CREAM TOPICAL at 23:08

## 2020-08-05 RX ADMIN — Medication 30 MILLIGRAM(S): at 17:56

## 2020-08-05 RX ADMIN — Medication 81 MILLIGRAM(S): at 11:35

## 2020-08-05 RX ADMIN — LIDOCAINE 1 PATCH: 4 CREAM TOPICAL at 11:36

## 2020-08-05 RX ADMIN — LIDOCAINE 1 PATCH: 4 CREAM TOPICAL at 19:47

## 2020-08-05 RX ADMIN — QUETIAPINE FUMARATE 12.5 MILLIGRAM(S): 200 TABLET, FILM COATED ORAL at 21:17

## 2020-08-05 RX ADMIN — Medication 100 MILLIGRAM(S): at 06:19

## 2020-08-05 RX ADMIN — CEFTRIAXONE 100 MILLIGRAM(S): 500 INJECTION, POWDER, FOR SOLUTION INTRAMUSCULAR; INTRAVENOUS at 11:40

## 2020-08-05 RX ADMIN — SPIRONOLACTONE 12.5 MILLIGRAM(S): 25 TABLET, FILM COATED ORAL at 06:06

## 2020-08-05 RX ADMIN — Medication 30 MILLIGRAM(S): at 06:06

## 2020-08-05 RX ADMIN — ENOXAPARIN SODIUM 40 MILLIGRAM(S): 100 INJECTION SUBCUTANEOUS at 21:17

## 2020-08-05 NOTE — PROGRESS NOTE ADULT - ASSESSMENT
84 y/o female pt with significant PMHx of A-fib (no longer considered a candidate for eliquis due to falls), CAD, HTN, SCC and Mitral Valve Disease who presents from cardiologists office after a scheduled routine visit in [facility] with RVR. also at home has had functional decline    Patient d/w cardiology Dr Palomino- would like to monitor patient for 24 hours on tele given 3.13 asymptomatic pause on tele monitor (criteria for ppm placement 4.5 seconds and/or symptomatic)    #a-fib with RVR  - improved  - metoprolol diltiazem  - cardio consult appreciated   - not on AC due to falls  - echo as above     #neck pain   - lidocaine patch  - xray c-spine osteopenia    #weakness  - PT consult - [facility]  - tsh and b12 wnl    #UTI  - ceftriaxone x 5 days on d/c can transition to ceftin    #cognitive decline  - likely onset of dementia  - patient follows with Dr. Atkins  - prescribed donepezil but had a bad reaction - no longer taking    #insomnia   - quetiapine    #CAD  - aspirin    #HTN- essential  - s/p lasix  - spironolactone, metoprolol and diltiazem  - monitor blood pressure    #[facility]    hospital course to date reviewed with daughter Twyla [phone]

## 2020-08-05 NOTE — PROGRESS NOTE ADULT - ASSESSMENT
will monitor in tele today  Continue present cardiac therapy  Will FUP VR today closely  Will need placement  Discussed with Dr EDUARDO Schmitz

## 2020-08-05 NOTE — PROGRESS NOTE ADULT - SUBJECTIVE AND OBJECTIVE BOX
[facility], THE HEART CENTER                                   [address] [place] [address]                                                      PHONE: [phone]                                                         FAX: [phone]  [email or web address]  ---------------------------------------------------------------------------------------------------------------------------------    Overnight events/patient complaints: No CP or SOB overnight  TELE AF rate better control today still having occasional 3.1 second pauses    PAST MEDICAL & SURGICAL HISTORY:  Atrial fibrillation  CAD (coronary artery disease)  Kyphoscoliosis  Aortic stenosis  SCC (squamous cell carcinoma)  Varicose vein of leg  Mitral valve disease  Hypertension  History of PTCA: one stent  After-cataract of both eyes  H/O colonoscopy  H/O varicose vein stripping    No Known Allergies    MEDICATIONS  (STANDING):  aspirin enteric coated 81 milliGRAM(s) Oral daily  cefTRIAXone   IVPB 1000 milliGRAM(s) IV Intermittent every 24 hours  diltiazem    Tablet 30 milliGRAM(s) Oral every 6 hours  enoxaparin Injectable 40 milliGRAM(s) SubCutaneous daily  lidocaine   Patch 1 Patch Transdermal daily  metoprolol tartrate 100 milliGRAM(s) Oral two times a day  QUEtiapine 12.5 milliGRAM(s) Oral at bedtime  spironolactone 12.5 milliGRAM(s) Oral daily    MEDICATIONS  (PRN):  acetaminophen   Tablet .. 650 milliGRAM(s) Oral every 6 hours PRN Mild Pain (1 - 3), Moderate Pain (4 - 6)  metoprolol tartrate Injectable 5 milliGRAM(s) IV Push every 6 hours PRN hold if sbp <100 or hr <55      Vital Signs Last 24 Hrs  T(C): 36.7 (04 Aug 2020 09:24), Max: 37.2 (03 Aug 2020 21:09)  T(F): 98 (04 Aug 2020 09:24), Max: 99 (03 Aug 2020 21:09)  HR: 120 (04 Aug 2020 09:24) (30 - 121)  BP: 100/67 (04 Aug 2020 09:24) (78/47 - 128/79)  BP(mean): --  RR: 16 (04 Aug 2020 09:24) (16 - 18)  SpO2: 95% (04 Aug 2020 09:24) (95% - 98%)  ICU Vital Signs Last 24 Hrs  SHERMAN BLACKWELL  I&O's Detail    03 Aug 2020 07:01  -  04 Aug 2020 07:00  --------------------------------------------------------  IN:    Oral Fluid: 605 mL    Sodium Chloride 0.9% IV Bolus: 250 mL  Total IN: 855 mL    OUT:    Voided: 1125 mL  Total OUT: 1125 mL    Total NET: -270 mL        I&O's Summary    03 Aug 2020 07:01  -  04 Aug 2020 07:00  --------------------------------------------------------  IN: 855 mL / OUT: 1125 mL / NET: -270 mL      Drug Dosing Weight  SHERMAN BLACKWELL    REVIEW OF SYSTEMS:    Constitutional: No fever, weight loss or fatigue  Eyes: No eye pain, visual disturbances, or discharge  ENMT:  No difficulty hearing, tinnitus, vertigo; No sinus or throat pain  Neck: No pain or stiffness  Respiratory: No cough, wheezing, chills or hemoptysis  Cardiovascular: No chest pain, palpitations, shortness of breath, dizziness or leg swelling  Gastrointestinal: No abdominal or epigastric pain. No nausea, vomiting or hematemesis; No diarrhea or constipation. No melena or hematochezia.  Genitourinary: No dysuria, frequency, hematuria or incontinence  Rectal: No pain, hemorrhoids or incontinence  Neurological: No headaches, memory loss, loss of strength, numbness or tremors  Skin: No itching, burning, rashes or lesions   Lymph Nodes: No enlarged glands  Endocrine: No heat or cold intolerance; No hair loss  Musculoskeletal: No joint pain or swelling; No muscle, back or extremity pain  Psychiatric: No depression, anxiety, mood swings or difficulty sleeping  Heme/Lymph: No easy bruising or bleeding gums  Allergy and Immunologic: No hives or eczema    PHYSICAL EXAM:  General: Appears well developed, well nourished alert and cooperative.  HEENT: Head; normocephalic, atraumatic.  Eyes: Pupils reactive, cornea wnl.  Neck: Supple, no nodes adenopathy, no NVD or carotid bruit or thyromegaly.  CARDIOVASCULAR: Normal S1 and S2, No murmur, rub, gallop or lift.   LUNGS: No rales, rhonchi or wheeze. Normal breath sounds bilaterally.  ABDOMEN: Soft, nontender without mass or organomegaly. bowel sounds normoactive.  EXTREMITIES: No clubbing, cyanosis or edema. Distal pulses wnl.   SKIN: warm and dry with normal turgor.  NEURO: Alert/oriented x 3/normal motor exam. No pathologic reflexes.    PSYCH: normal affect.        LABS:                        14.2   8.31  )-----------( 237      ( 04 Aug 2020 06:10 )             43.4     08-04    137  |  98  |  30.0<H>  ----------------------------<  90  3.7   |  26.0  |  0.84    Ca    9.1      04 Aug 2020 06:10      SHERMAN BLACKWELL            RADIOLOGY & ADDITIONAL STUDIES:    INTERPRETATION OF TELEMETRY (personally reviewed):    ECG:    ECHO:    STRESS TEST:    CARDIAC CATHETERIZATION:    ACTIVE PROBLEMS:  HEALTH ISSUES - PROBLEM Dx:

## 2020-08-05 NOTE — PROGRESS NOTE ADULT - SUBJECTIVE AND OBJECTIVE BOX
Patient is a 85y old  Female who presents with a chief complaint of AF (05 Aug 2020 09:36)    Patient seen and examined at bedside.     ALLERGIES:  No Known Allergies    MEDICATIONS  (STANDING):  aspirin enteric coated 81 milliGRAM(s) Oral daily  cefTRIAXone   IVPB 1000 milliGRAM(s) IV Intermittent every 24 hours  diltiazem    Tablet 30 milliGRAM(s) Oral every 6 hours  enoxaparin Injectable 40 milliGRAM(s) SubCutaneous daily  lidocaine   Patch 1 Patch Transdermal daily  metoprolol tartrate 100 milliGRAM(s) Oral two times a day  QUEtiapine 12.5 milliGRAM(s) Oral at bedtime  spironolactone 12.5 milliGRAM(s) Oral daily    MEDICATIONS  (PRN):  acetaminophen   Tablet .. 650 milliGRAM(s) Oral every 6 hours PRN Mild Pain (1 - 3), Moderate Pain (4 - 6)  metoprolol tartrate Injectable 5 milliGRAM(s) IV Push every 6 hours PRN hold if sbp <100 or hr <55    Vital Signs Last 24 Hrs  T(F): 97.6 (05 Aug 2020 08:48), Max: 98 (04 Aug 2020 15:11)  HR: 61 (05 Aug 2020 08:05) (61 - 93)  BP: 106/70 (05 Aug 2020 08:05) (86/54 - 124/81)  RR: 18 (05 Aug 2020 08:48) (17 - 18)  SpO2: 96% (05 Aug 2020 08:48) (95% - 98%)  I&O's Summary    04 Aug 2020 07:01  -  05 Aug 2020 07:00  --------------------------------------------------------  IN: 720 mL / OUT: 1400 mL / NET: -680 mL    PHYSICAL EXAM:  General: NAD, Alert  ENT: MMM, no thrush; elderly  Neck: Supple, No JVD  Lungs: Clear to auscultation bilaterally, good air entry, non-labored breathing  Cardio: +s1/s2; +irregular  Abdomen: Soft, Nontender, Nondistended; Bowel sounds present  Extremities: No calf tenderness      LABS:                        14.9   7.18  )-----------( 266      ( 05 Aug 2020 07:02 )             46.6     08-05    137  |  97  |  29.0  ----------------------------<  87  3.9   |  29.0  |  0.81    Ca    9.5      05 Aug 2020 07:02    eGFR if Non African American: 67 mL/min/1.73M2 (08-05-20 @ 07:02)  eGFR if : 77 mL/min/1.73M2 (08-05-20 @ 07:02)    RADIOLOGY & ADDITIONAL TESTS:  - no new tests    Care Discussed with Consultants/Other Providers:   Cardiology

## 2020-08-06 ENCOUNTER — TRANSCRIPTION ENCOUNTER (OUTPATIENT)
Age: 85
End: 2020-08-06

## 2020-08-06 VITALS — DIASTOLIC BLOOD PRESSURE: 56 MMHG | HEART RATE: 73 BPM | SYSTOLIC BLOOD PRESSURE: 97 MMHG

## 2020-08-06 PROCEDURE — 83735 ASSAY OF MAGNESIUM: CPT

## 2020-08-06 PROCEDURE — 99239 HOSP IP/OBS DSCHRG MGMT >30: CPT

## 2020-08-06 PROCEDURE — 99291 CRITICAL CARE FIRST HOUR: CPT | Mod: 25

## 2020-08-06 PROCEDURE — U0003: CPT

## 2020-08-06 PROCEDURE — 83880 ASSAY OF NATRIURETIC PEPTIDE: CPT

## 2020-08-06 PROCEDURE — 82607 VITAMIN B-12: CPT

## 2020-08-06 PROCEDURE — 97163 PT EVAL HIGH COMPLEX 45 MIN: CPT

## 2020-08-06 PROCEDURE — 73110 X-RAY EXAM OF WRIST: CPT

## 2020-08-06 PROCEDURE — 72052 X-RAY EXAM NECK SPINE 6/>VWS: CPT

## 2020-08-06 PROCEDURE — 80053 COMPREHEN METABOLIC PANEL: CPT

## 2020-08-06 PROCEDURE — 73060 X-RAY EXAM OF HUMERUS: CPT

## 2020-08-06 PROCEDURE — 81001 URINALYSIS AUTO W/SCOPE: CPT

## 2020-08-06 PROCEDURE — 80048 BASIC METABOLIC PNL TOTAL CA: CPT

## 2020-08-06 PROCEDURE — 73090 X-RAY EXAM OF FOREARM: CPT

## 2020-08-06 PROCEDURE — 70450 CT HEAD/BRAIN W/O DYE: CPT

## 2020-08-06 PROCEDURE — 84100 ASSAY OF PHOSPHORUS: CPT

## 2020-08-06 PROCEDURE — 93005 ELECTROCARDIOGRAM TRACING: CPT

## 2020-08-06 PROCEDURE — 93306 TTE W/DOPPLER COMPLETE: CPT

## 2020-08-06 PROCEDURE — 96374 THER/PROPH/DIAG INJ IV PUSH: CPT

## 2020-08-06 PROCEDURE — 84443 ASSAY THYROID STIM HORMONE: CPT

## 2020-08-06 PROCEDURE — 85027 COMPLETE CBC AUTOMATED: CPT

## 2020-08-06 PROCEDURE — 71045 X-RAY EXAM CHEST 1 VIEW: CPT

## 2020-08-06 PROCEDURE — 86769 SARS-COV-2 COVID-19 ANTIBODY: CPT

## 2020-08-06 PROCEDURE — 73070 X-RAY EXAM OF ELBOW: CPT

## 2020-08-06 PROCEDURE — 84484 ASSAY OF TROPONIN QUANT: CPT

## 2020-08-06 PROCEDURE — 36415 COLL VENOUS BLD VENIPUNCTURE: CPT

## 2020-08-06 PROCEDURE — 72125 CT NECK SPINE W/O DYE: CPT

## 2020-08-06 RX ORDER — CEFUROXIME AXETIL 250 MG
1 TABLET ORAL
Qty: 0 | Refills: 0 | DISCHARGE
Start: 2020-08-06 | End: 2020-08-07

## 2020-08-06 RX ORDER — METOPROLOL TARTRATE 50 MG
1 TABLET ORAL
Qty: 0 | Refills: 0 | DISCHARGE
Start: 2020-08-06

## 2020-08-06 RX ORDER — ASPIRIN/CALCIUM CARB/MAGNESIUM 324 MG
1 TABLET ORAL
Qty: 0 | Refills: 0 | DISCHARGE
Start: 2020-08-06

## 2020-08-06 RX ORDER — METOPROLOL TARTRATE 50 MG
1 TABLET ORAL
Qty: 0 | Refills: 0 | DISCHARGE

## 2020-08-06 RX ORDER — SPIRONOLACTONE 25 MG/1
0.5 TABLET, FILM COATED ORAL
Qty: 0 | Refills: 0 | DISCHARGE
Start: 2020-08-06

## 2020-08-06 RX ORDER — FUROSEMIDE 40 MG
1 TABLET ORAL
Qty: 0 | Refills: 0 | DISCHARGE

## 2020-08-06 RX ORDER — DILTIAZEM HCL 120 MG
1 CAPSULE, EXT RELEASE 24 HR ORAL
Qty: 0 | Refills: 0 | DISCHARGE
Start: 2020-08-06

## 2020-08-06 RX ORDER — CEFUROXIME AXETIL 250 MG
500 TABLET ORAL EVERY 12 HOURS
Refills: 0 | Status: DISCONTINUED | OUTPATIENT
Start: 2020-08-06 | End: 2020-08-06

## 2020-08-06 RX ORDER — ACETAMINOPHEN 500 MG
2 TABLET ORAL
Qty: 0 | Refills: 0 | DISCHARGE
Start: 2020-08-06

## 2020-08-06 RX ORDER — LIDOCAINE 4 G/100G
1 CREAM TOPICAL
Qty: 0 | Refills: 0 | DISCHARGE
Start: 2020-08-06

## 2020-08-06 RX ORDER — QUETIAPINE FUMARATE 200 MG/1
12.5 TABLET, FILM COATED ORAL
Qty: 0 | Refills: 0 | DISCHARGE
Start: 2020-08-06

## 2020-08-06 RX ORDER — SPIRONOLACTONE 25 MG/1
1 TABLET, FILM COATED ORAL
Qty: 0 | Refills: 0 | DISCHARGE

## 2020-08-06 RX ADMIN — SPIRONOLACTONE 12.5 MILLIGRAM(S): 25 TABLET, FILM COATED ORAL at 05:20

## 2020-08-06 RX ADMIN — Medication 30 MILLIGRAM(S): at 12:44

## 2020-08-06 RX ADMIN — Medication 81 MILLIGRAM(S): at 12:44

## 2020-08-06 RX ADMIN — LIDOCAINE 1 PATCH: 4 CREAM TOPICAL at 12:44

## 2020-08-06 RX ADMIN — Medication 30 MILLIGRAM(S): at 05:21

## 2020-08-06 RX ADMIN — Medication 100 MILLIGRAM(S): at 05:21

## 2020-08-06 NOTE — DISCHARGE NOTE PROVIDER - NSDCMRMEDTOKEN_GEN_ALL_CORE_FT
acetaminophen 325 mg oral tablet: 2 tab(s) orally every 6 hours, As needed, Mild Pain (1 - 3), Moderate Pain (4 - 6)  Aldactone 25 mg oral tablet: 0.5 tab(s) orally once a day  aspirin 81 mg oral delayed release tablet: 1 tab(s) orally once a day  Calcium 500+D oral tablet, chewable: 1 tab(s) orally once a day  Cardizem 30 mg oral tablet: 1 tab(s) orally every 6 hours  cefuroxime 500 mg oral tablet: 1 tab(s) orally every 12 hours last dose 8/7/2020 PM  lidocaine 5% topical film: Apply topically to affected area once a day  Metoprolol Tartrate 100 mg oral tablet: 1 tab(s) orally 2 times a day  QUEtiapine: 12.5 milligram(s) orally once a day (at bedtime)

## 2020-08-06 NOTE — DISCHARGE NOTE PROVIDER - NSDCFUSCHEDAPPT_GEN_ALL_CORE_FT
SHERMAN BLACKWELL ; 08/11/2020 ; NPP Derm [address] [facility]  SHERMAN BLACKWELL ; 09/09/2020 ; NPP Neurology [address] [facility]

## 2020-08-06 NOTE — DISCHARGE NOTE NURSING/CASE MANAGEMENT/SOCIAL WORK - PATIENT PORTAL LINK FT
You can access the FollowMyHealth Patient Portal offered by [facility] by registering at the following website: [email or web address]. By joining [email or web address]’s FollowMyHealth portal, you will also be able to view your health information using other applications (apps) compatible with our system.

## 2020-08-06 NOTE — DISCHARGE NOTE PROVIDER - HOSPITAL COURSE
84 y/o female pt with significant PMHx of A-fib (no longer considered a candidate for eliquis due to falls), CAD, HTN, SCC and Mitral Valve Disease who [facility] cardi[facility]. Patient not on a/c due to risk of falls per cardiology. patient being discharged after medications for afib optimized with cardiology. patient not a candidate for PPM currently as not having pauses >4.5 seconds per cardiology. ok to d/c patient to [facility]  per cardiology        Time spent on patients discharge 32 minutes

## 2020-08-06 NOTE — DISCHARGE NOTE PROVIDER - PROVIDER TOKENS
PROVIDER:[TOKEN:[[id number]]],FREE:[LAST:[Primary Care Doctor],PHONE:[(   )    -],FAX:[(   )    -]]

## 2020-08-06 NOTE — PROGRESS NOTE ADULT - SUBJECTIVE AND OBJECTIVE BOX
[facility], THE HEART CENTER                                   [address] [place] [address]                                                      PHONE: [phone]                                                         FAX: [phone]  [email or web address]  ---------------------------------------------------------------------------------------------------------------------------------    Overnight events/patient complaints: No acute concerns. Patient with short pauses on tele, longest 2.6 seconds, asymptomatic     INTERPRETATION OF TELEMETRY (personally reviewed)    ECHO:  < from: TTE Echo Complete w/o Contrast w/ Doppler (08.01.20 @ 13:29) >   1. Left ventricular ejection fraction, by visual estimation, is 60 to 65%.   2. The mitral in-flow pattern reveals no discernable A-wave, therefore no comment on diastolic function can be made.   3. Thickening and calcification of the anterior and posterior mitral valve leaflets.   4. Moderate tricuspid regurgitation.   5. TAVR Ben valve in aortic position.   6. Estimated pulmonary artery systolic pressure is 42.1 mmHg assuming a right atrial pressure of 8 mmHg, which is consistent with mild pulmonary hypertension.   7. The aortic valve mean gradient is 9.3 mmHg consistent with normally opening aortic valve.    I&O's Summary    05 Aug 2020 07:01  -  06 Aug 2020 07:00  --------------------------------------------------------  IN: 0 mL / OUT: 750 mL / NET: -750 mL      No Known Allergies    MEDICATIONS  (STANDING):  aspirin enteric coated 81 milliGRAM(s) Oral daily  cefuroxime   Tablet 500 milliGRAM(s) Oral every 12 hours  diltiazem    Tablet 30 milliGRAM(s) Oral every 6 hours  enoxaparin Injectable 40 milliGRAM(s) SubCutaneous daily  lidocaine   Patch 1 Patch Transdermal daily  metoprolol tartrate 100 milliGRAM(s) Oral two times a day  QUEtiapine 12.5 milliGRAM(s) Oral at bedtime  spironolactone 12.5 milliGRAM(s) Oral daily    MEDICATIONS  (PRN):  acetaminophen   Tablet .. 650 milliGRAM(s) Oral every 6 hours PRN Mild Pain (1 - 3), Moderate Pain (4 - 6)  metoprolol tartrate Injectable 5 milliGRAM(s) IV Push every 6 hours PRN hold if sbp <100 or hr <55      Vital Signs Last 24 Hrs  T(C): 36.6 (06 Aug 2020 09:36), Max: 37.1 (05 Aug 2020 21:14)  T(F): 97.9 (06 Aug 2020 09:36), Max: 98.8 (05 Aug 2020 21:14)  HR: 80 (06 Aug 2020 09:36) (76 - 99)  BP: 106/72 (06 Aug 2020 09:36) (92/61 - 116/78)  BP(mean): --  RR: 20 (06 Aug 2020 09:36) (19 - 20)  SpO2: 96% (06 Aug 2020 09:36) (95% - 96%)  ICU Vital Signs Last 24 Hrs    PHYSICAL EXAM:  General: Appears well developed, well nourished alert and cooperative.  HEENT: Head; normocephalic, atraumatic.Pupils reactive, cornea wnl. Neck supple, no nodes adenopathy, no JVD  CARDIOVASCULAR: irregular rhythm, Normal S1 and variable S2, 1/6 KAYLENE, no rub, gallop or lift.   LUNGS: No rales, rhonchi or wheeze. Normal breath sounds bilaterally.  ABDOMEN: Soft, nontender without mass or organomegaly. bowel sounds normoactive.  EXTREMITIES: No clubbing, cyanosis or edema.   SKIN: warm and dry with normal turgor.  NEURO: Alert/oriented x 3/normal motor exam.   PSYCH: normal affect.        LABS:                        14.9   7.18  )-----------( 266      ( 05 Aug 2020 07:02 )             46.6     08-05    137  |  97<L>  |  29.0<H>  ----------------------------<  87  3.9   |  29.0  |  0.81    Ca    9.5      05 Aug 2020 07:02      ASSESSMENT AND PLAN:  In summary, SHERMAN BLACKWELL is a 85y Female with past medical history significant chronic AF S/P failed DCCV, off systemic AC due to recurrent fall, CAD s/p PCI of RCA , AS S/P TAVR , RV dysfunction who presented from the office with AF with RVR and hypervolemia found to have asymptomatic pauses on telemetry     Chronic AF c/b RVR and acute on chronic RV dysfunction now resolved   - continue present grant blocking agents, no indication for PPM at this time, rate is controlled and patient with short asymptomatic pauses   - continue ASA  - continue low dose aldactone     No additional cardiac recommendations. Discharge planning. Will facilitate outpatient follow-up     Thank you for allowing [facility] to participate in the care of this patient.  Please feel free to call with any questions or concerns.

## 2020-08-06 NOTE — PROGRESS NOTE ADULT - PROVIDER SPECIALTY LIST ADULT
Cardiology
Hospitalist
Cardiology

## 2020-08-06 NOTE — PROGRESS NOTE ADULT - ASSESSMENT
86 y/o female pt with significant PMHx of A-fib (no longer considered a candidate for eliquis due to falls), CAD, HTN, SCC and Mitral Valve Disease who presents from cardiologists office after a scheduled routine visit in A-fib with RVR. also at home has had functional decline    #a-fib with RVR  - improved  - metoprolol diltiazem  - cardio consult appreciated   - not on AC due to falls  - echo as above     #neck pain   - lidocaine patch  - xray c-spine osteopenia    #weakness  - PT consult - [facility]  - tsh and b12 wnl    #UTI  - ceftriaxone x 5 days on d/c can transition to ceftin    #cognitive decline  - likely onset of dementia  - patient follows with Dr. Atkins  - prescribed donepezil but had a bad reaction - no longer taking    #insomnia   - quetiapine    #CAD  - aspirin    #HTN- essential  - s/p lasix  - spironolactone, metoprolol and diltiazem  - monitor blood pressure    #DVT prophylaxis  - v[place]res    hospital course to date reviewed with daughter Twyla [phone]

## 2020-08-06 NOTE — DISCHARGE NOTE PROVIDER - NSDCCPCAREPLAN_GEN_ALL_CORE_FT
PRINCIPAL DISCHARGE DIAGNOSIS  Diagnosis: Rapid atrial fibrillation  Assessment and Plan of Treatment: - take medications as prescribed   - follow up with cardiology and pmd

## 2020-08-06 NOTE — DISCHARGE NOTE PROVIDER - CARE PROVIDER_API CALL
Leia Alexis  INTERNAL MEDICINE  [address] [place] [phone]  Phone: [phone]  Fax: [phone]  Follow Up Time:     Primary Care Doctor,   Phone: (   )    -  Fax: (   )    -  Follow Up Time:

## 2020-08-06 NOTE — PROGRESS NOTE ADULT - SUBJECTIVE AND OBJECTIVE BOX
Patient is a 85y old  Female who presents with a chief complaint of Afib (05 Aug 2020 10:53)    Patient seen and examined at bedside. d/w cardiology Dr Sepideh quintanilla to d/c to [facility].     ALLERGIES:  No Known Allergies    MEDICATIONS  (STANDING):  aspirin enteric coated 81 milliGRAM(s) Oral daily  cefuroxime   Tablet 500 milliGRAM(s) Oral every 12 hours  diltiazem    Tablet 30 milliGRAM(s) Oral every 6 hours  enoxaparin Injectable 40 milliGRAM(s) SubCutaneous daily  lidocaine   Patch 1 Patch Transdermal daily  metoprolol tartrate 100 milliGRAM(s) Oral two times a day  QUEtiapine 12.5 milliGRAM(s) Oral at bedtime  spironolactone 12.5 milliGRAM(s) Oral daily    MEDICATIONS  (PRN):  acetaminophen   Tablet .. 650 milliGRAM(s) Oral every 6 hours PRN Mild Pain (1 - 3), Moderate Pain (4 - 6)  metoprolol tartrate Injectable 5 milliGRAM(s) IV Push every 6 hours PRN hold if sbp <100 or hr <55    Vital Signs Last 24 Hrs  T(F): 97.9 (06 Aug 2020 09:36), Max: 98.8 (05 Aug 2020 21:14)  HR: 80 (06 Aug 2020 09:36) (76 - 99)  BP: 106/72 (06 Aug 2020 09:36) (92/61 - 116/78)  RR: 20 (06 Aug 2020 09:36) (19 - 20)  SpO2: 96% (06 Aug 2020 09:36) (95% - 96%)  I&O's Summary    05 Aug 2020 07:01  -  06 Aug 2020 07:00  --------------------------------------------------------  IN: 0 mL / OUT: 750 mL / NET: -750 mL      PHYSICAL EXAM:  General: NAD, Alert  ENT: MMM, no thrush; elderly  Neck: Supple, No JVD  Lungs: Clear to auscultation bilaterally, good air entry, non-labored breathing  Cardio: +s1/s2; +irregular  Abdomen: Soft, Nontender, Nondistended; Bowel sounds present  Extremities: No calf tenderness      LABS:                        14.9   7.18  )-----------( 266      ( 05 Aug 2020 07:02 )             46.6     08-05    137  |  97  |  29.0  ----------------------------<  87  3.9   |  29.0  |  0.81    Ca    9.5      05 Aug 2020 07:02    eGFR if Non African American: 67 mL/min/1.73M2 (08-05-20 @ 07:02)  eGFR if : 77 mL/min/1.73M2 (08-05-20 @ 07:02)    RADIOLOGY & ADDITIONAL TESTS:  - no new tests    Care Discussed with Consultants/Other Providers:   Cardiology

## 2020-08-11 ENCOUNTER — APPOINTMENT (OUTPATIENT)
Dept: DERMATOLOGY | Facility: CLINIC | Age: 85
End: 2020-08-11

## 2020-08-14 RX ORDER — APIXABAN 2.5 MG/1
2 TABLET, FILM COATED ORAL
Qty: 0 | Refills: 0 | DISCHARGE
Start: 2020-08-14 | End: 2020-08-27

## 2020-08-24 ENCOUNTER — INPATIENT (INPATIENT)
Facility: HOSPITAL | Age: 85
LOS: 6 days | Discharge: NURSING, REHAB OR LONG-TERM CARE | DRG: 175 | End: 2020-08-31
Attending: FAMILY MEDICINE | Admitting: HOSPITALIST
Payer: MEDICARE

## 2020-08-24 VITALS
RESPIRATION RATE: 16 BRPM | HEIGHT: 65 IN | WEIGHT: 121.92 LBS | OXYGEN SATURATION: 98 % | HEART RATE: 80 BPM | SYSTOLIC BLOOD PRESSURE: 134 MMHG | DIASTOLIC BLOOD PRESSURE: 73 MMHG | TEMPERATURE: 98 F

## 2020-08-24 DIAGNOSIS — Z95.2 PRESENCE OF PROSTHETIC HEART VALVE: Chronic | ICD-10-CM

## 2020-08-24 DIAGNOSIS — H26.40 UNSPECIFIED SECONDARY CATARACT: Chronic | ICD-10-CM

## 2020-08-24 DIAGNOSIS — I50.9 HEART FAILURE, UNSPECIFIED: ICD-10-CM

## 2020-08-24 DIAGNOSIS — Z98.61 CORONARY ANGIOPLASTY STATUS: Chronic | ICD-10-CM

## 2020-08-24 DIAGNOSIS — Z98.890 OTHER SPECIFIED POSTPROCEDURAL STATES: Chronic | ICD-10-CM

## 2020-08-24 LAB
ALBUMIN SERPL ELPH-MCNC: 2.9 G/DL — LOW (ref 3.3–5)
ALP SERPL-CCNC: 165 U/L — HIGH (ref 40–120)
ALT FLD-CCNC: 53 U/L — SIGNIFICANT CHANGE UP (ref 12–78)
ANION GAP SERPL CALC-SCNC: 7 MMOL/L — SIGNIFICANT CHANGE UP (ref 5–17)
APPEARANCE UR: CLEAR — SIGNIFICANT CHANGE UP
APTT BLD: 33.5 SEC — SIGNIFICANT CHANGE UP (ref 27.5–35.5)
AST SERPL-CCNC: 45 U/L — HIGH (ref 15–37)
BASOPHILS # BLD AUTO: 0.08 K/UL — SIGNIFICANT CHANGE UP (ref 0–0.2)
BASOPHILS NFR BLD AUTO: 0.8 % — SIGNIFICANT CHANGE UP (ref 0–2)
BILIRUB SERPL-MCNC: 0.6 MG/DL — SIGNIFICANT CHANGE UP (ref 0.2–1.2)
BILIRUB UR-MCNC: NEGATIVE — SIGNIFICANT CHANGE UP
BUN SERPL-MCNC: 23 MG/DL — SIGNIFICANT CHANGE UP (ref 7–23)
CALCIUM SERPL-MCNC: 9.3 MG/DL — SIGNIFICANT CHANGE UP (ref 8.5–10.1)
CHLORIDE SERPL-SCNC: 105 MMOL/L — SIGNIFICANT CHANGE UP (ref 96–108)
CO2 SERPL-SCNC: 26 MMOL/L — SIGNIFICANT CHANGE UP (ref 22–31)
COLOR SPEC: YELLOW — SIGNIFICANT CHANGE UP
CREAT SERPL-MCNC: 1.08 MG/DL — SIGNIFICANT CHANGE UP (ref 0.5–1.3)
DIFF PNL FLD: NEGATIVE — SIGNIFICANT CHANGE UP
EOSINOPHIL # BLD AUTO: 0.16 K/UL — SIGNIFICANT CHANGE UP (ref 0–0.5)
EOSINOPHIL NFR BLD AUTO: 1.6 % — SIGNIFICANT CHANGE UP (ref 0–6)
GLUCOSE SERPL-MCNC: 92 MG/DL — SIGNIFICANT CHANGE UP (ref 70–99)
GLUCOSE UR QL: NEGATIVE MG/DL — SIGNIFICANT CHANGE UP
HCT VFR BLD CALC: 41.1 % — SIGNIFICANT CHANGE UP (ref 34.5–45)
HGB BLD-MCNC: 13 G/DL — SIGNIFICANT CHANGE UP (ref 11.5–15.5)
IMM GRANULOCYTES NFR BLD AUTO: 1 % — SIGNIFICANT CHANGE UP (ref 0–1.5)
INR BLD: 2.14 RATIO — HIGH (ref 0.88–1.16)
KETONES UR-MCNC: NEGATIVE — SIGNIFICANT CHANGE UP
LEUKOCYTE ESTERASE UR-ACNC: NEGATIVE — SIGNIFICANT CHANGE UP
LYMPHOCYTES # BLD AUTO: 2.35 K/UL — SIGNIFICANT CHANGE UP (ref 1–3.3)
LYMPHOCYTES # BLD AUTO: 23.4 % — SIGNIFICANT CHANGE UP (ref 13–44)
MCHC RBC-ENTMCNC: 29 PG — SIGNIFICANT CHANGE UP (ref 27–34)
MCHC RBC-ENTMCNC: 31.6 GM/DL — LOW (ref 32–36)
MCV RBC AUTO: 91.5 FL — SIGNIFICANT CHANGE UP (ref 80–100)
MONOCYTES # BLD AUTO: 1.64 K/UL — HIGH (ref 0–0.9)
MONOCYTES NFR BLD AUTO: 16.3 % — HIGH (ref 2–14)
NEUTROPHILS # BLD AUTO: 5.73 K/UL — SIGNIFICANT CHANGE UP (ref 1.8–7.4)
NEUTROPHILS NFR BLD AUTO: 56.9 % — SIGNIFICANT CHANGE UP (ref 43–77)
NITRITE UR-MCNC: NEGATIVE — SIGNIFICANT CHANGE UP
NT-PROBNP SERPL-SCNC: 3328 PG/ML — HIGH (ref 0–450)
PH UR: 5 — SIGNIFICANT CHANGE UP (ref 5–8)
PLATELET # BLD AUTO: 331 K/UL — SIGNIFICANT CHANGE UP (ref 150–400)
POTASSIUM SERPL-MCNC: 4.9 MMOL/L — SIGNIFICANT CHANGE UP (ref 3.5–5.3)
POTASSIUM SERPL-SCNC: 4.9 MMOL/L — SIGNIFICANT CHANGE UP (ref 3.5–5.3)
PROT SERPL-MCNC: 7.4 GM/DL — SIGNIFICANT CHANGE UP (ref 6–8.3)
PROT UR-MCNC: NEGATIVE MG/DL — SIGNIFICANT CHANGE UP
PROTHROM AB SERPL-ACNC: 24.2 SEC — HIGH (ref 10.6–13.6)
RBC # BLD: 4.49 M/UL — SIGNIFICANT CHANGE UP (ref 3.8–5.2)
RBC # FLD: 16 % — HIGH (ref 10.3–14.5)
SARS-COV-2 RNA SPEC QL NAA+PROBE: DETECTED
SODIUM SERPL-SCNC: 138 MMOL/L — SIGNIFICANT CHANGE UP (ref 135–145)
SP GR SPEC: 1 — LOW (ref 1.01–1.02)
UROBILINOGEN FLD QL: NEGATIVE MG/DL — SIGNIFICANT CHANGE UP
WBC # BLD: 10.06 K/UL — SIGNIFICANT CHANGE UP (ref 3.8–10.5)
WBC # FLD AUTO: 10.06 K/UL — SIGNIFICANT CHANGE UP (ref 3.8–10.5)

## 2020-08-24 PROCEDURE — 71045 X-RAY EXAM CHEST 1 VIEW: CPT | Mod: 26

## 2020-08-24 PROCEDURE — 87086 URINE CULTURE/COLONY COUNT: CPT

## 2020-08-24 PROCEDURE — 36415 COLL VENOUS BLD VENIPUNCTURE: CPT

## 2020-08-24 PROCEDURE — 85027 COMPLETE CBC AUTOMATED: CPT

## 2020-08-24 PROCEDURE — 87186 SC STD MICRODIL/AGAR DIL: CPT

## 2020-08-24 PROCEDURE — 81003 URINALYSIS AUTO W/O SCOPE: CPT

## 2020-08-24 PROCEDURE — 99223 1ST HOSP IP/OBS HIGH 75: CPT | Mod: AI

## 2020-08-24 PROCEDURE — U0003: CPT

## 2020-08-24 PROCEDURE — 87635 SARS-COV-2 COVID-19 AMP PRB: CPT

## 2020-08-24 PROCEDURE — 80053 COMPREHEN METABOLIC PANEL: CPT

## 2020-08-24 PROCEDURE — 97162 PT EVAL MOD COMPLEX 30 MIN: CPT | Mod: GP

## 2020-08-24 PROCEDURE — 80048 BASIC METABOLIC PNL TOTAL CA: CPT

## 2020-08-24 PROCEDURE — 97530 THERAPEUTIC ACTIVITIES: CPT | Mod: GP

## 2020-08-24 PROCEDURE — 93010 ELECTROCARDIOGRAM REPORT: CPT

## 2020-08-24 PROCEDURE — 71045 X-RAY EXAM CHEST 1 VIEW: CPT

## 2020-08-24 PROCEDURE — 85025 COMPLETE CBC W/AUTO DIFF WBC: CPT

## 2020-08-24 PROCEDURE — 71275 CT ANGIOGRAPHY CHEST: CPT | Mod: 26

## 2020-08-24 PROCEDURE — 81001 URINALYSIS AUTO W/SCOPE: CPT

## 2020-08-24 RX ORDER — METOPROLOL TARTRATE 50 MG
100 TABLET ORAL
Refills: 0 | Status: DISCONTINUED | OUTPATIENT
Start: 2020-08-24 | End: 2020-08-31

## 2020-08-24 RX ORDER — ONDANSETRON 8 MG/1
4 TABLET, FILM COATED ORAL EVERY 6 HOURS
Refills: 0 | Status: DISCONTINUED | OUTPATIENT
Start: 2020-08-24 | End: 2020-08-31

## 2020-08-24 RX ORDER — CALCIUM CARBONATE 500(1250)
1 TABLET ORAL
Qty: 0 | Refills: 0 | DISCHARGE

## 2020-08-24 RX ORDER — SPIRONOLACTONE 25 MG/1
12.5 TABLET, FILM COATED ORAL DAILY
Refills: 0 | Status: DISCONTINUED | OUTPATIENT
Start: 2020-08-24 | End: 2020-08-31

## 2020-08-24 RX ORDER — HALOPERIDOL DECANOATE 100 MG/ML
2.5 INJECTION INTRAMUSCULAR ONCE
Refills: 0 | Status: DISCONTINUED | OUTPATIENT
Start: 2020-08-24 | End: 2020-08-24

## 2020-08-24 RX ORDER — CHOLECALCIFEROL (VITAMIN D3) 125 MCG
1 CAPSULE ORAL
Qty: 0 | Refills: 0 | DISCHARGE

## 2020-08-24 RX ORDER — MAGNESIUM HYDROXIDE 400 MG/1
30 TABLET, CHEWABLE ORAL
Qty: 0 | Refills: 0 | DISCHARGE

## 2020-08-24 RX ORDER — METOPROLOL TARTRATE 50 MG
100 TABLET ORAL ONCE
Refills: 0 | Status: COMPLETED | OUTPATIENT
Start: 2020-08-24 | End: 2020-08-24

## 2020-08-24 RX ORDER — HALOPERIDOL DECANOATE 100 MG/ML
2.5 INJECTION INTRAMUSCULAR EVERY 6 HOURS
Refills: 0 | Status: DISCONTINUED | OUTPATIENT
Start: 2020-08-24 | End: 2020-08-31

## 2020-08-24 RX ORDER — LIDOCAINE 4 G/100G
0 CREAM TOPICAL
Qty: 0 | Refills: 0 | DISCHARGE

## 2020-08-24 RX ORDER — HALOPERIDOL DECANOATE 100 MG/ML
2.5 INJECTION INTRAMUSCULAR ONCE
Refills: 0 | Status: COMPLETED | OUTPATIENT
Start: 2020-08-24 | End: 2020-08-24

## 2020-08-24 RX ORDER — ALBUTEROL 90 UG/1
0.5 AEROSOL, METERED ORAL
Qty: 0 | Refills: 0 | DISCHARGE

## 2020-08-24 RX ORDER — FUROSEMIDE 40 MG
40 TABLET ORAL
Refills: 0 | Status: DISCONTINUED | OUTPATIENT
Start: 2020-08-25 | End: 2020-08-26

## 2020-08-24 RX ORDER — ACETAMINOPHEN 500 MG
650 TABLET ORAL EVERY 4 HOURS
Refills: 0 | Status: DISCONTINUED | OUTPATIENT
Start: 2020-08-24 | End: 2020-08-28

## 2020-08-24 RX ORDER — QUETIAPINE FUMARATE 200 MG/1
0.5 TABLET, FILM COATED ORAL
Qty: 0 | Refills: 0 | DISCHARGE

## 2020-08-24 RX ORDER — SENNA PLUS 8.6 MG/1
2 TABLET ORAL AT BEDTIME
Refills: 0 | Status: DISCONTINUED | OUTPATIENT
Start: 2020-08-24 | End: 2020-08-31

## 2020-08-24 RX ORDER — APIXABAN 2.5 MG/1
5 TABLET, FILM COATED ORAL EVERY 12 HOURS
Refills: 0 | Status: DISCONTINUED | OUTPATIENT
Start: 2020-08-24 | End: 2020-08-31

## 2020-08-24 RX ORDER — DILTIAZEM HCL 120 MG
30 CAPSULE, EXT RELEASE 24 HR ORAL EVERY 6 HOURS
Refills: 0 | Status: DISCONTINUED | OUTPATIENT
Start: 2020-08-24 | End: 2020-08-31

## 2020-08-24 RX ORDER — FUROSEMIDE 40 MG
40 TABLET ORAL ONCE
Refills: 0 | Status: COMPLETED | OUTPATIENT
Start: 2020-08-24 | End: 2020-08-24

## 2020-08-24 RX ORDER — QUETIAPINE FUMARATE 200 MG/1
25 TABLET, FILM COATED ORAL AT BEDTIME
Refills: 0 | Status: DISCONTINUED | OUTPATIENT
Start: 2020-08-24 | End: 2020-08-26

## 2020-08-24 RX ORDER — ASPIRIN/CALCIUM CARB/MAGNESIUM 324 MG
81 TABLET ORAL DAILY
Refills: 0 | Status: DISCONTINUED | OUTPATIENT
Start: 2020-08-24 | End: 2020-08-31

## 2020-08-24 RX ADMIN — HALOPERIDOL DECANOATE 2.5 MILLIGRAM(S): 100 INJECTION INTRAMUSCULAR at 21:14

## 2020-08-24 RX ADMIN — APIXABAN 5 MILLIGRAM(S): 2.5 TABLET, FILM COATED ORAL at 23:30

## 2020-08-24 RX ADMIN — Medication 40 MILLIGRAM(S): at 20:19

## 2020-08-24 RX ADMIN — HALOPERIDOL DECANOATE 2.5 MILLIGRAM(S): 100 INJECTION INTRAMUSCULAR at 20:01

## 2020-08-24 NOTE — H&P ADULT - NSHPPHYSICALEXAM_GEN_ALL_CORE
Vital Signs Last 24 Hrs  T(C): 37 (24 Aug 2020 18:58), Max: 37 (24 Aug 2020 18:58)  T(F): 98.6 (24 Aug 2020 18:58), Max: 98.6 (24 Aug 2020 18:58)  HR: 105 (24 Aug 2020 21:34) (80 - 128)  BP: 130/97 (24 Aug 2020 21:34) (127/103 - 147/99)  BP(mean): --  RR: 21 (24 Aug 2020 21:34) (16 - 21)  SpO2: 100% (24 Aug 2020 21:34) (94% - 100%)

## 2020-08-24 NOTE — H&P ADULT - HISTORY OF PRESENT ILLNESS
84yo/F with PMH chronic afib (discharged from [facility] on 8/6/20 off AC due to high risk of falls but Eliquis resumed at full dose of 10mg BID at [facility] on 8/14/20), s/p TAVR, CAD s/p stent, chr diastolic CHF EF 60-65%, SCC, dementia presented with worsening SOB form [facility]. Patient is unable to provide any history as very confused and agitated at present. Patient found to be in acute CHF in ED and given Lasix 40mg IV. Telemetry- afib at 110-120's.

## 2020-08-24 NOTE — ED ADULT NURSE REASSESSMENT NOTE - NS ED NURSE REASSESS COMMENT FT1
at 2018, pt began pulling at EKG wires, stating "I need to go home to see my son" Pt reassured that she is in the hospital. Pt medicated as per dr harrington for safety

## 2020-08-24 NOTE — ED ADULT NURSE NOTE - CCCP TRG CHIEF CMPLNT
History and Physical





- Chief Complaint


Shortness of breath





- History of Present Illness


76 yo obese F w/ COPD, CHRF, and HTN presents with cough and SOB. Patient 

states she was doing relatively well until 5 days ago when she began to note 

increased cough and shortness of breath. Her cough then became produce of yellow

/green sputum. She sees pulmonologist Dr. Roland for COPD and uses 3-5 L/min O2 

chronically. She has had at least 3 COPD exacerbations in the last year. She 

explains that azithromycin and prednisone usually provide temporary relief but 

her symptoms eventually return. Currently she is short of breath but no in 

severe distress. She also is now in Afib, which she denies any prior knowledge 

of.





History Information





- Allergies/Home Medication List


Allergies/Adverse Reactions: 








Penicillins Allergy (Verified 04/13/18 21:38)


 





Home Medications: 








ACETAMINOPHEN  04/13/18 [Last Taken Unknown]


Advair 500/50 (*)  04/13/18 [Last Taken Unknown]


Albuterol  04/13/18 [Last Taken Unknown]


Aspirin 325 mg (*)  04/13/18 [Last Taken Unknown]


Atorvastatin Calcium  04/13/18 [Last Taken Unknown]


CLONAZEPAM  04/13/18 [Last Taken Unknown]


Escitalopram Oxalate  04/13/18 [Last Taken Unknown]


Metoprolol Tartrate  04/13/18 [Last Taken Unknown]


Prednisone  04/13/18 [Last Taken Unknown]


Proair Hfa  04/13/18 [Last Taken Unknown]


Ramipril  04/13/18 [Last Taken Unknown]


Spiriva Handihaler  04/13/18 [Last Taken Unknown]


Sucralfate  04/13/18 [Last Taken Unknown]


Tricor  04/13/18 [Last Taken Unknown]


amLODIPine BESYLATE  04/13/18 [Last Taken Unknown]





I have personally reviewed and updated: family history, medical history





- Past Medical History


COPD, hypertension





- Family History


Positive for: cancer





- Social History


Smoking Status: Former smoker





Review of Systems


Review of Systems: 





ROS: 10pt was reviewed & negative except for what was stated in HPI & below





Physical Exam


Physical Exam: 

















Temp Pulse Resp BP Pulse Ox


 


 36.7 C   104 H  25 H  145/106 H  95 


 


 04/13/18 21:16  04/13/18 23:15  04/13/18 23:15  04/13/18 23:15  04/13/18 23:15




















O2 (L/minute)                  6














Constitutional: obese, uncomfortable


Eyes: PERRL, EOMI


Ears, Nose, Mouth, Throat: moist mucous membranes, no oral mucosal ulcers


Cardiovascular: systolic murmur, irregularly irregular, edema (2+ LLE, 1+LLE)


Respiratory: reduced air movement, other (Prolonged expiratory phase)


Gastrointestinal: normoactive bowel sounds, soft, non-tender abdomen


Skin: warm, normal color


Musculoskeletal: full muscle strength, no muscle tenderness


Neurologic: AAOx3, CN II-XII Intact


Psychiatric: interacting appropriately, not anxious





Lab Data & Imaging Review





 04/13/18 21:00





 04/13/18 21:00














WBC  12.02 10^3/uL (3.80-9.50)  H  04/13/18  21:00    


 


RBC  4.00 10^6/uL (4.18-5.33)  L  04/13/18  21:00    


 


Hgb  11.3 g/dL (12.6-16.3)  L  04/13/18  21:00    


 


Hct  36.1 % (38.0-47.0)  L  04/13/18  21:00    


 


MCV  90.3 fL (81.5-99.8)   04/13/18  21:00    


 


MCH  28.3 pg (27.9-34.1)   04/13/18  21:00    


 


MCHC  31.3 g/dL (32.4-36.7)  L  04/13/18  21:00    


 


RDW  14.6 % (11.5-15.2)   04/13/18  21:00    


 


Plt Count  282 10^3/uL (150-400)   04/13/18  21:00    


 


MPV  11.8 fL (8.7-11.7)  H  04/13/18  21:00    


 


Neut % (Auto)  81.9 % (39.3-74.2)  H  04/13/18  21:00    


 


Lymph % (Auto)  9.2 % (15.0-45.0)  L  04/13/18  21:00    


 


Mono % (Auto)  6.8 % (4.5-13.0)   04/13/18  21:00    


 


Eos % (Auto)  0.9 % (0.6-7.6)   04/13/18  21:00    


 


Baso % (Auto)  0.3 % (0.3-1.7)   04/13/18  21:00    


 


Nucleat RBC Rel Count  0.0 % (0.0-0.2)   04/13/18  21:00    


 


Absolute Neuts (auto)  9.83 10^3/uL (1.70-6.50)  H  04/13/18  21:00    


 


Absolute Lymphs (auto)  1.11 10^3/uL (1.00-3.00)   04/13/18  21:00    


 


Absolute Monos (auto)  0.82 10^3/uL (0.30-0.80)  H  04/13/18  21:00    


 


Absolute Eos (auto)  0.11 10^3/uL (0.03-0.40)   04/13/18  21:00    


 


Absolute Basos (auto)  0.04 10^3/uL (0.02-0.10)   04/13/18  21:00    


 


Absolute Nucleated RBC  0.00 10^3/uL (0-0.01)   04/13/18  21:00    


 


Immature Gran %  0.9 % (0.0-1.1)   04/13/18  21:00    


 


Immature Gran #  0.11 10^3/uL (0.00-0.10)  H  04/13/18  21:00    


 


PT  15.4 SEC (12.0-15.0)  H  04/13/18  21:00    


 


INR  1.20  (0.83-1.16)  H  04/13/18  21:00    


 


APTT  34.5 SEC (23.0-38.0)   04/13/18  21:00    


 


VBG Lactic Acid  0.9 mmol/L (0.7-2.1)   04/13/18  22:45    


 


Sodium  131 mEq/L (135-145)  L  04/13/18  21:00    


 


Potassium  4.7 mEq/L (3.5-5.2)   04/13/18  21:00    


 


Chloride  94 mEq/L ()  L  04/13/18  21:00    


 


Carbon Dioxide  26 mEq/l (22-31)   04/13/18  21:00    


 


Anion Gap  11 mEq/L (8-16)   04/13/18  21:00    


 


BUN  20 mg/dL (7-23)   04/13/18  21:00    


 


Creatinine  1.1 mg/dL (0.6-1.0)  H  04/13/18  21:00    


 


Estimated GFR  49   04/13/18  21:00    


 


Glucose  151 mg/dL ()  H  04/13/18  21:00    


 


Calcium  9.0 mg/dL (8.5-10.4)   04/13/18  21:00    


 


Total Bilirubin  1.4 mg/dL (0.1-1.4)   04/13/18  21:00    


 


Conjugated Bilirubin  1.1 mg/dL (0.0-0.5)  H  04/13/18  21:00    


 


Unconjugated Bilirubin  0.4 mg/dL (0.0-1.1)   04/13/18  21:00    


 


AST  32 IU/L (14-46)   04/13/18  21:00    


 


ALT  46 IU/L (9-52)   04/13/18  21:00    


 


Alkaline Phosphatase  79 IU/L ()   04/13/18  21:00    


 


Troponin I  < 0.012 ng/mL (0.000-0.034)   04/13/18  21:00    


 


NT-Pro-B Natriuret Pep  5660 pg/mL (0-125)  H  04/13/18  21:00    


 


Total Protein  6.9 g/dL (6.3-8.2)   04/13/18  21:00    


 


Albumin  3.6 g/dL (3.5-5.0)   04/13/18  21:00    








Imaging Review: 





 Imaging Impressions





Chest X-Ray  04/13/18 21:56


Impression:


1.  Right middle lobe and lower lobe pneumonia.  Small right pleural effusion.


2.  Cardiomegaly.  Possible component of congestive heart failure, with mild 

pulmonary edema.














Assessment & Plan


Assessment: 








76 yo obese F w/ COPD, HTN, and CHRF presents with pneumonia and new AF.








Plan: 


1. Pneumonia - Based on SOB, cough w/ sputum production, and RML/RLL 

infiltrates on CXR. WBC of 12, afebrile. Tachycardic but in Afib so may not be 

physiologic. 


- Will treat with levofloxacin IV noting multiple prior azithromycin courses 

and risk for resistant organisms, mainly Pseudomonas


- Blood and sputum cultures ordered


2. Severe COPD - Likely GOLD III-D noting FEV1 of 49% predicted and now >3 

exacerbations in the last year. I suspect mild exacerbation is present noting 

patient is moving very small lung volumes currently. She has had at least three 

(4 counting today) exacerbations in the last year. She usually improves with 

course of azithromycin and prednisone but symptoms recur a few weeks later. 


- Methylprednisolone x1 now, then prednisone 40 mg qD


- Duonebs QID, albuterol PRN


- Levofloxacin as above


- Will send sputum culture to see if we can identify resistant organism


- Consider CT chest to look for predisposing factor to recurrent exacerbations (

malignancy, bronchiectasis), could be done as outpatient


- Would likely benefit from daily azithromycin moving forward, but will defer 

this to pulmonologist


3. Atrial fibrillation - New problem for this patient, likely triggered by 

pneumonia. [id number] of 3 for HTN, female sex, and age. Rates currently  

without rate control.


- Monitor on telemetry, TTE ordered


- Treat acute infection as above


- On metoprolol for HTN already


- Consider anticoagulation


4. Suspected dCHF - Pulmonary edema and elevated BNP on admission. Will 

evaluate with TTE.


- Lasix 20 mg IV x1


- Evaluate for ongoing dosing as needed


5. Acute on chronic HRF - Likely multifactorial from infectin, COPD exacerbation

, AF, and mild pulmonary edema. Uses 3-5 L/min O2 at baseline, currently on 6 L 

O2. 


- Acute treatments as above


- Incentive spirometry, wean O2 as able


6. HTN - Continue metoprolol





Diet - Regular


Code - Full


Ppx - LMWH


Dispo - Admit under inpatient status noting multiple active issues and need for 

further treatment and work-up. difficulty breathing

## 2020-08-24 NOTE — ED ADULT NURSE REASSESSMENT NOTE - NS ED NURSE REASSESS COMMENT FT1
pt received in bed, alert and oriented to self and place. Cardiac monitoring in progress, 4L NC in progress. Dr Zamorano aware of pt neuro and oxygenation status. Safety maintained

## 2020-08-24 NOTE — H&P ADULT - NSICDXPASTSURGICALHX_GEN_ALL_CORE_FT
PAST SURGICAL HISTORY:  After-cataract of both eyes     H/O colonoscopy     H/O varicose vein stripping     History of PTCA one stent    S/P TAVR (transcatheter aortic valve replacement)

## 2020-08-24 NOTE — H&P ADULT - ASSESSMENT
#Acute on chronic diastolic CHF  Admit to telemetry  S/p IV Lasix, will continue with 40mg BID IV  Cardio eval DR Simms  ECHO from previous admission [place]- EF 60-65%  Monitor lytes while on diuresis    #PE  Patient previously discharged off ELiquis due to high risk of falls  Eliquis resumed at [facility] on 8/14 at 10mg BID  Will continue 5mg BID as after 7 days of initial loading dose    #Afib with RVR  S/p Metoprolol PO  Will continue BB and cardizem  Adjust dose further to control the rate  AC by Eliquis    #agitation likely 2 to sundowning  Haldol and Ativan prn  Increase Seroquel to 25mg QHS    #CAD s/p stent  #S/p TAVR  Continue Aspirin    #COVID pending, low suspicion    #DVT proph- on Eliquis    #Dispo- inpatient admit.

## 2020-08-24 NOTE — H&P ADULT - NSHPLABSRESULTS_GEN_ALL_CORE
13.0   10.06 )-----------( 331      ( 24 Aug 2020 17:20 )             41.1     24 Aug 2020 17:20    138    |  105    |  23     ----------------------------<  92     4.9     |  26     |  1.08     Ca    9.3        24 Aug 2020 17:20    TPro  7.4    /  Alb  2.9    /  TBili  0.6    /  DBili  x      /  AST  45     /  ALT  53     /  AlkPhos  165    24 Aug 2020 17:20    LIVER FUNCTIONS - ( 24 Aug 2020 17:20 )  Alb: 2.9 g/dL / Pro: 7.4 gm/dL / ALK PHOS: 165 U/L / ALT: 53 U/L / AST: 45 U/L / GGT: x           PT/INR - ( 24 Aug 2020 17:20 )   PT: 24.2 sec;   INR: 2.14 ratio       PTT - ( 24 Aug 2020 17:20 )  PTT:33.5 sec    CARDIAC MARKERS ( 24 Aug 2020 17:20 )  <0.015 ng/mL / x     / x     / x     / x

## 2020-08-24 NOTE — ED PROVIDER NOTE - OBJECTIVE STATEMENT
84 y/o female with PMHx of Afib on Eliquis, CAD, Kyphoscoliosis, aortic stenosis, mitral valve disease, HTN, DVT on 8/15 presents to the ED BIBEMS from [place] c/o difficulty breathing. Endorses feeling SOB two nights ago, in ED dos not have any respiratory complaints. Given pt's recent DVT, pt was sent in to r/o PE. Denies chest pain, fever. Pt is DNR/DNI as per [facility] form. No other complaints at this time.

## 2020-08-24 NOTE — ED ADULT NURSE NOTE - NSIMPLEMENTINTERV_GEN_ALL_ED
Implemented All Fall with Harm Risk Interventions:  [place] to call system. Call bell, personal items and telephone within reach. Instruct patient to call for assistance. Room bathroom lighting operational. Non-slip footwear when patient is off stretcher. Physically safe environment: no spills, clutter or unnecessary equipment. Stretcher in lowest position, wheels locked, appropriate side rails in place. Provide visual cue, wrist band, yellow gown, etc. Monitor gait and stability. Monitor for mental status changes and reorient to person, place, and time. Review medications for side effects contributing to fall risk. Reinforce activity limits and safety measures with patient and family. Provide visual clues: red socks.

## 2020-08-24 NOTE — ED ADULT TRIAGE NOTE - CHIEF COMPLAINT QUOTE
Pt was brought in by ambulance for evaluation of difficulty breathing, hx of LLE DVT on 8/15, placed on Eliquis, to r/o PE.

## 2020-08-24 NOTE — H&P ADULT - NSICDXPASTMEDICALHX_GEN_ALL_CORE_FT
PAST MEDICAL HISTORY:  Aortic stenosis s/p TAVR    Atrial fibrillation     CAD (coronary artery disease)     Hypertension     Kyphoscoliosis     Mitral valve disease     SCC (squamous cell carcinoma)     Varicose vein of leg

## 2020-08-25 DIAGNOSIS — I50.9 HEART FAILURE, UNSPECIFIED: ICD-10-CM

## 2020-08-25 DIAGNOSIS — I25.10 ATHEROSCLEROTIC HEART DISEASE OF NATIVE CORONARY ARTERY WITHOUT ANGINA PECTORIS: ICD-10-CM

## 2020-08-25 DIAGNOSIS — I48.91 UNSPECIFIED ATRIAL FIBRILLATION: ICD-10-CM

## 2020-08-25 DIAGNOSIS — R06.02 SHORTNESS OF BREATH: ICD-10-CM

## 2020-08-25 DIAGNOSIS — I26.99 OTHER PULMONARY EMBOLISM WITHOUT ACUTE COR PULMONALE: ICD-10-CM

## 2020-08-25 LAB
ANION GAP SERPL CALC-SCNC: 7 MMOL/L — SIGNIFICANT CHANGE UP (ref 5–17)
BUN SERPL-MCNC: 17 MG/DL — SIGNIFICANT CHANGE UP (ref 7–23)
CALCIUM SERPL-MCNC: 9.1 MG/DL — SIGNIFICANT CHANGE UP (ref 8.5–10.1)
CHLORIDE SERPL-SCNC: 107 MMOL/L — SIGNIFICANT CHANGE UP (ref 96–108)
CO2 SERPL-SCNC: 28 MMOL/L — SIGNIFICANT CHANGE UP (ref 22–31)
CREAT SERPL-MCNC: 0.74 MG/DL — SIGNIFICANT CHANGE UP (ref 0.5–1.3)
GLUCOSE SERPL-MCNC: 81 MG/DL — SIGNIFICANT CHANGE UP (ref 70–99)
HCT VFR BLD CALC: 37.3 % — SIGNIFICANT CHANGE UP (ref 34.5–45)
HGB BLD-MCNC: 12.1 G/DL — SIGNIFICANT CHANGE UP (ref 11.5–15.5)
MCHC RBC-ENTMCNC: 28.5 PG — SIGNIFICANT CHANGE UP (ref 27–34)
MCHC RBC-ENTMCNC: 32.4 GM/DL — SIGNIFICANT CHANGE UP (ref 32–36)
MCV RBC AUTO: 88 FL — SIGNIFICANT CHANGE UP (ref 80–100)
PLATELET # BLD AUTO: 290 K/UL — SIGNIFICANT CHANGE UP (ref 150–400)
POTASSIUM SERPL-MCNC: 3.9 MMOL/L — SIGNIFICANT CHANGE UP (ref 3.5–5.3)
POTASSIUM SERPL-SCNC: 3.9 MMOL/L — SIGNIFICANT CHANGE UP (ref 3.5–5.3)
RBC # BLD: 4.24 M/UL — SIGNIFICANT CHANGE UP (ref 3.8–5.2)
RBC # FLD: 15.9 % — HIGH (ref 10.3–14.5)
SARS-COV-2 IGG SERPL QL IA: POSITIVE
SARS-COV-2 IGM SERPL IA-ACNC: 2.14 INDEX — HIGH
SARS-COV-2 RNA SPEC QL NAA+PROBE: SIGNIFICANT CHANGE UP
SODIUM SERPL-SCNC: 142 MMOL/L — SIGNIFICANT CHANGE UP (ref 135–145)
WBC # BLD: 8.05 K/UL — SIGNIFICANT CHANGE UP (ref 3.8–10.5)
WBC # FLD AUTO: 8.05 K/UL — SIGNIFICANT CHANGE UP (ref 3.8–10.5)

## 2020-08-25 PROCEDURE — 99223 1ST HOSP IP/OBS HIGH 75: CPT

## 2020-08-25 PROCEDURE — 99233 SBSQ HOSP IP/OBS HIGH 50: CPT

## 2020-08-25 RX ADMIN — Medication 1 TABLET(S): at 10:59

## 2020-08-25 RX ADMIN — APIXABAN 5 MILLIGRAM(S): 2.5 TABLET, FILM COATED ORAL at 10:59

## 2020-08-25 RX ADMIN — Medication 81 MILLIGRAM(S): at 10:59

## 2020-08-25 RX ADMIN — Medication 40 MILLIGRAM(S): at 10:59

## 2020-08-25 RX ADMIN — Medication 30 MILLIGRAM(S): at 11:06

## 2020-08-25 RX ADMIN — Medication 40 MILLIGRAM(S): at 22:23

## 2020-08-25 RX ADMIN — Medication 30 MILLIGRAM(S): at 06:26

## 2020-08-25 RX ADMIN — APIXABAN 5 MILLIGRAM(S): 2.5 TABLET, FILM COATED ORAL at 22:23

## 2020-08-25 RX ADMIN — QUETIAPINE FUMARATE 25 MILLIGRAM(S): 200 TABLET, FILM COATED ORAL at 22:23

## 2020-08-25 RX ADMIN — Medication 30 MILLIGRAM(S): at 00:10

## 2020-08-25 RX ADMIN — SPIRONOLACTONE 12.5 MILLIGRAM(S): 25 TABLET, FILM COATED ORAL at 10:59

## 2020-08-25 RX ADMIN — Medication 100 MILLIGRAM(S): at 11:06

## 2020-08-25 RX ADMIN — Medication 100 MILLIGRAM(S): at 22:23

## 2020-08-25 NOTE — CONSULT NOTE ADULT - SUBJECTIVE AND OBJECTIVE BOX
history obtained from   chart      REASON FOR CONSULT:    CHIEF COMPLAINT:    HPI:  86yo/F with PMH chronic afib (discharged from [place] on 20 off AC due to high risk of falls but Eliquis resumed at full dose of 10mg BID at [facility] on 20), s/p TAVR, CAD s/p stent, chr diastolic CHF EF 60-65%, SCC, dementia presented with worsening SOB form [facility]. Patient is unable to provide any history as very confused and agitated at present. Patient found to be in acute CHF in ED and given Lasix 40mg IV. Telemetry- afib at 110-120's.  patient was noted to  covid positive , patient is comfortable  , 2 L NC oxygen ,   patient blood pressure is controlled         PAST MEDICAL & SURGICAL HISTORY:  Atrial fibrillation  CAD (coronary artery disease)  Kyphoscoliosis  Aortic stenosis: s/p TAVR  SCC (squamous cell carcinoma)  Varicose vein of leg  Mitral valve disease  Hypertension  S/P TAVR (transcatheter aortic valve replacement)  History of PTCA: one stent  After-cataract of both eyes  H/O colonoscopy  H/O varicose vein stripping      Allergies    No Known Allergies    Intolerances        SOCIAL HISTORY: non smoker     FAMILY HISTORY:  No pertinent family history in first degree relatives      MEDICATIONS:Home Medications:  acetaminophen 325 mg oral tablet: 2 tabs orally every 4 hours as needed for bodyaches, headache (24 Aug 2020 20:42)  albuterol 5 mg/mL (0.5%) inhalation solution: 0.5 milliliter(s) inhaled every 6 hours, As Needed - for shortness of breath and/or wheezing (24 Aug 2020 20:42)  Aldactone 25 mg oral tablet: 0.5 tab(s) orally once a day (24 Aug 2020 19:45)  aspirin 81 mg oral delayed release tablet: 1 tab(s) orally once a day (24 Aug 2020 19:45)  Cardizem 30 mg oral tablet: 1 tab(s) orally every 6 hours (24 Aug 2020 19:45)  Eliquis 5 mg oral tablet: 2 tab(s) orally 2 times a day x 14 days   (24 Aug 2020 20:42)  lidocaine 4% patch: Apply 1 patch topically to affected area every 12 hours as needed for pain, 12 hours on, 12 off (24 Aug 2020 20:42)  Metoprolol Tartrate 100 mg oral tablet: 1 tab(s) orally 2 times a day   ***hold for sbp &lt; 100 or apical &lt; 60*** (24 Aug 2020 19:45)  Milk of Magnesia 8% oral suspension: 30 milliliter(s) orally once a day, As Needed (24 Aug 2020 20:42)  Os-John 500 (1250 mg calcium carbonate) oral tablet: 1 tab(s) orally once a day (24 Aug 2020 20:42)  SEROquel 25 mg oral tablet: 0.5 tab(s) orally once a day (at bedtime) (24 Aug 2020 20:42)  Vitamin D3 50,000 intl units (1250 mcg) oral capsule: 1 cap(s) orally once a week (24 Aug 2020 20:42)    MEDICATIONS  (STANDING):  apixaban 5 milliGRAM(s) Oral every 12 hours  aspirin enteric coated 81 milliGRAM(s) Oral daily  diltiazem    Tablet 30 milliGRAM(s) Oral every 6 hours  furosemide   Injectable 40 milliGRAM(s) IV Push two times a day  metoprolol tartrate 100 milliGRAM(s) Oral two times a day  multivitamin 1 Tablet(s) Oral daily  QUEtiapine 25 milliGRAM(s) Oral at bedtime  spironolactone 12.5 milliGRAM(s) Oral daily    MEDICATIONS  (PRN):  acetaminophen   Tablet .. 650 milliGRAM(s) Oral every 4 hours PRN Mild Pain (1 - 3)  aluminum hydroxide/magnesium hydroxide/simethicone Suspension 30 milliLiter(s) Oral every 4 hours PRN Dyspepsia  haloperidol    Injectable 2.5 milliGRAM(s) IntraMuscular every 6 hours PRN severe agiation  LORazepam   Injectable 1 milliGRAM(s) IV Push every 3 hours PRN Agitation  ondansetron Injectable 4 milliGRAM(s) IV Push every 6 hours PRN Nausea  senna 2 Tablet(s) Oral at bedtime PRN Constipation      REVIEW OF SYSTEMS:    limited due to confusion , forget fullness   All other review of systems is negative unless indicated above    Vital Signs Last 24 Hrs  T(C): 36.2 (25 Aug 2020 10:21), Max: 37 (24 Aug 2020 18:58)  T(F): 97.1 (25 Aug 2020 10:21), Max: 98.6 (24 Aug 2020 18:58)  HR: 102 (25 Aug 2020 09:00) (80 - 128)  BP: 126/77 (25 Aug 2020 08:00) (116/73 - 147/99)  BP(mean): 88 (25 Aug 2020 08:00) (84 - 93)  RR: 19 (25 Aug 2020 09:00) (13 - 22)  SpO2: 95% (25 Aug 2020 09:00) (94% - 100%)    I&O's Summary    24 Aug 2020 07:01  -  25 Aug 2020 07:00  --------------------------------------------------------  IN: 0 mL / OUT: 450 mL / NET: -450 mL        PHYSICAL EXAM:    Constitutional: NAD, awake and alert, well-developed  HEENT: PERR, EOMI,  No oral cyananosis.  Neck:  supple,  No JVD  Respiratory: Breath sounds are equal  basal crackles   Cardiovascular: S1 and S2, IR IR   Gastrointestinal: Bowel Sounds present, soft, nontender.   Extremities: No peripheral edema. No clubbing or cyanosis.  Vascular: 2+ peripheral pulses  Neurological: A/O x  forgetful  no focal deficits  Musculoskeletal: no calf tenderness.  Skin: No rashes.      LABS: All Labs Reviewed:                        12.1   8.05  )-----------( 290      ( 25 Aug 2020 06:43 )             37.3                         13.0   10.06 )-----------( 331      ( 24 Aug 2020 17:20 )             41.1     25 Aug 2020 06:43    142    |  107    |  17     ----------------------------<  81     3.9     |  28     |  0.74   24 Aug 2020 17:20    138    |  105    |  23     ----------------------------<  92     4.9     |  26     |  1.08     Ca    9.1        25 Aug 2020 06:43  Ca    9.3        24 Aug 2020 17:20    TPro  7.4    /  Alb  2.9    /  TBili  0.6    /  DBili  x      /  AST  45     /  ALT  53     /  AlkPhos  165    24 Aug 2020 17:20    PT/INR - ( 24 Aug 2020 17:20 )   PT: 24.2 sec;   INR: 2.14 ratio         PTT - ( 24 Aug 2020 17:20 )  PTT:33.5 sec  CARDIAC MARKERS ( 24 Aug 2020 17:20 )  <0.015 ng/mL / x     / x     / x     / x          Blood Culture:    @ 17:20  Pro Bnp [id number]        RADIOLOGY/EK20  afib with controlled rate 89  non specific T changes      < from: TTE Echo Complete w/o Contrast w/ Doppler (20 @ 13:29) >  Summary:   1. Left ventricular ejection fraction, by visual estimation, is 60 to 65%.   2. The mitral in-flow pattern reveals no discernable A-wave, therefore no comment on diastolic function can be made.   3. Thickening and calcification of the anterior and posterior mitral valve leaflets.   4. Moderate tricuspid regurgitation.   5. TAVR Ben valve in aortic position.   6. Estimated pulmonary artery systolic pressure is 42.1 mmHg assuming a right atrial pressure of 8 mmHg, which is consistent with mild pulmonary hypertension.   7. The aortic valve mean gradient is 9.3 mmHg consistent with normally opening aortic valve.    MD Izzy Electronically signed on 2020 at 7:11:01 PM      < end of copied text >      monitor  afib with controlled rate

## 2020-08-25 NOTE — CONSULT NOTE ADULT - PROBLEM SELECTOR RECOMMENDATION 2
mild acute on chronic diastolic heart failure  underlying valvular , CAD   superimposed with covid   contiue lasix IV with parameters ,

## 2020-08-25 NOTE — PROGRESS NOTE ADULT - SUBJECTIVE AND OBJECTIVE BOX
86yo/F with PMH chronic afib (discharged from [place] on 8/6/20 off AC due to high risk of falls but Eliquis resumed at full dose of 10mg BID at [facility] on 8/14/20), s/p TAVR, CAD s/p stent, chr diastolic CHF EF 60-65%, SCC, dementia presented with worsening SOB form [facility]. Patient is unable to provide any history as very confused and agitated at present. Patient found to be in acute CHF in ED and given Lasix 40mg IV. Telemetry- afib at 110-120's.      Review of Systems:  Other Review of Systems: All other review of systems negative, except as noted in HPI      8/25- no complaints    ICU Vital Signs Last 24 Hrs  T(C): 36.2 (25 Aug 2020 10:21), Max: 37 (24 Aug 2020 18:58)  T(F): 97.1 (25 Aug 2020 10:21), Max: 98.6 (24 Aug 2020 18:58)  HR: 77 (25 Aug 2020 12:00) (77 - 128)  BP: 112/94 (25 Aug 2020 12:00) (112/94 - 147/99)  BP(mean): 98 (25 Aug 2020 12:00) (84 - 104)  ABP: --  ABP(mean): --  RR: 20 (25 Aug 2020 12:00) (13 - 22)  SpO2: 95% (25 Aug 2020 12:00) (94% - 100%)     Physical Exam:  · Constitutional	Well-developed, well nourished  · Neck	No bruits; no thyromegaly or nodules  · Respiratory	detailed exam  · Respiratory Comments	unable to fully cooperate  · Cardiovascular	detailed exam  · Cardiovascular Details	irregular rate and rhythm  · Gastrointestinal	Soft, non-tender, no hepatosplenomegaly, normal bowel sounds  · Genitourinary	Normal genitalia; no lesions; no discharge  · Extremities	No cyanosis, clubbing or edema  · Neurological	detailed exam  · Mental Status	confused and agitated  · Skin	No lesions; no rash  · Musculoskeletal	No joint pain, swelling or deformity; no limitation of movement       Labs and Results:                          12.1   8.05  )-----------( 290      ( 25 Aug 2020 06:43 )             37.3   08-25    142  |  107  |  17  ----------------------------<  81  3.9   |  28  |  0.74    Ca    9.1      25 Aug 2020 06:43    TPro  7.4  /  Alb  2.9<L>  /  TBili  0.6  /  DBili  x   /  AST  45<H>  /  ALT  53  /  AlkPhos  165<H>  08-24        < from: CT Angio Chest PE Protocol w/ IV Cont (08.24.20 @ 18:41) >  IMPRESSION:  Pulmonary edema with bilateral pleural effusions and bibasilar compressive atelectasis. Additional findings suggesting right-sided heart failure.    Slight defect along the periphery of the posterior right upper lobe segmentalpulmonary artery may represent a focal acute versus chronic pulmonary embolus. Linear defect in the origin of the left upper lobe pulmonary artery may represent chronic thrombus or pulmonary web.      < end of copied text >

## 2020-08-25 NOTE — DIETITIAN INITIAL EVALUATION ADULT. - PERTINENT MEDS FT
MEDICATIONS  (STANDING):  apixaban 5 milliGRAM(s) Oral every 12 hours  aspirin enteric coated 81 milliGRAM(s) Oral daily  diltiazem    Tablet 30 milliGRAM(s) Oral every 6 hours  furosemide   Injectable 40 milliGRAM(s) IV Push two times a day  metoprolol tartrate 100 milliGRAM(s) Oral two times a day  multivitamin 1 Tablet(s) Oral daily  QUEtiapine 25 milliGRAM(s) Oral at bedtime  spironolactone 12.5 milliGRAM(s) Oral daily    MEDICATIONS  (PRN):  acetaminophen   Tablet .. 650 milliGRAM(s) Oral every 4 hours PRN Mild Pain (1 - 3)  aluminum hydroxide/magnesium hydroxide/simethicone Suspension 30 milliLiter(s) Oral every 4 hours PRN Dyspepsia  haloperidol    Injectable 2.5 milliGRAM(s) IntraMuscular every 6 hours PRN severe agiation  LORazepam   Injectable 1 milliGRAM(s) IV Push every 3 hours PRN Agitation  ondansetron Injectable 4 milliGRAM(s) IV Push every 6 hours PRN Nausea  senna 2 Tablet(s) Oral at bedtime PRN Constipation

## 2020-08-25 NOTE — CONSULT NOTE ADULT - ASSESSMENT
86yo/F with PMH chronic afib (discharged from [place] on 8/6/20 off AC due to high risk of falls but Eliquis resumed at full dose of 10mg BID at [facility] on 8/14/20), s/p TAVR, CAD s/p stent, chr diastolic CHF EF 60-65%, SCC, dementia presented with worsening SOB form [facility]. Patient is unable to provide any history as very confused and agitated at present. Patient found to be in acute CHF in ED and given Lasix 40mg IV. Telemetry- afib at 110-120's. CT chest here with pulmonary edema/vascular congestive changes, acute on chronic PE, noted with covid-19 pcr positive, had covid-19 ab 8/1 that were also positive. denies cough, fevers.     1. dyspnea. CHF exacerbation. PE. prior covid-19 viral syndrome  - covid-19 ab positive 8/1, covid-19 pcr positive indicating prior exposure  - imaging reviewed  - diuresis for chf/ac for pe  - observe off antibiotics  - f/u cbc  - supportive care    2. other issues - care per medicine

## 2020-08-25 NOTE — CONSULT NOTE ADULT - SUBJECTIVE AND OBJECTIVE BOX
Patient is a 85y old  Female who presents with a chief complaint of worsening SOB (25 Aug 2020 13:38)    HPI:  86yo/F with PMH chronic afib (discharged from [place] on 20 off AC due to high risk of falls but Eliquis resumed at full dose of 10mg BID at [facility] on 20), s/p TAVR, CAD s/p stent, chr diastolic CHF EF 60-65%, SCC, dementia presented with worsening SOB form [facility]. Patient is unable to provide any history as very confused and agitated at present. Patient found to be in acute CHF in ED and given Lasix 40mg IV. Telemetry- afib at 110-120's. CT chest here with pulmonary edema/vascular congestive changes, acute on chronic PE, noted with covid-19 pcr positive, had covid-19 ab  that were also positive. denies cough, fevers.       PMH: as above  PSH: as above  Meds: per reconciliation sheet, noted below  MEDICATIONS  (STANDING):  apixaban 5 milliGRAM(s) Oral every 12 hours  aspirin enteric coated 81 milliGRAM(s) Oral daily  diltiazem    Tablet 30 milliGRAM(s) Oral every 6 hours  furosemide   Injectable 40 milliGRAM(s) IV Push two times a day  metoprolol tartrate 100 milliGRAM(s) Oral two times a day  multivitamin 1 Tablet(s) Oral daily  QUEtiapine 25 milliGRAM(s) Oral at bedtime  spironolactone 12.5 milliGRAM(s) Oral daily      Allergies    No Known Allergies    Intolerances      Social: no smoking, no alcohol, no illegal drugs; no recent travel, no exposure to TB  FAMILY HISTORY:  No pertinent family history in first degree relatives     no history of premature cardiovascular disease in first degree relatives    ROS: the patient denies fever, no chills, no HA, no dizziness, no sore throat, no blurry vision, no CP, no palpitations, no cough, no abdominal pain, no diarrhea, no N/V, no dysuria, no leg pain, no claudication, no rash, no joint aches, no rectal pain or bleeding, no night sweats  All other systems reviewed and are negative    Vital Signs Last 24 Hrs  T(C): 36.6 (25 Aug 2020 14:41), Max: 37 (24 Aug 2020 18:58)  T(F): 97.9 (25 Aug 2020 14:41), Max: 98.6 (24 Aug 2020 18:58)  HR: 81 (25 Aug 2020 16:00) (67 - 128)  BP: 104/61 (25 Aug 2020 14:00) (104/61 - 147/99)  BP(mean): 71 (25 Aug 2020 14:00) (71 - 104)  RR: 25 (25 Aug 2020 16:00) (13 - 25)  SpO2: 95% (25 Aug 2020 14:00) (94% - 100%)  Daily Height in cm: 165.1 (24 Aug 2020 16:33)    Daily Weight in k (25 Aug 2020 14:39)    PE:  Constitutional: frail looking  HEENT: NC/AT, EOMI, PERRLA, conjunctivae clear; ears and nose atraumatic; pharynx benign  Neck: supple; thyroid not palpable  Back: no tenderness  Respiratory: decreased breath sounds  Cardiovascular: S1S2 regular, no murmurs  Abdomen: soft, not tender, not distended, positive BS; liver and spleen WNL  Genitourinary: no suprapubic tenderness  Lymphatic: no LN palpable  Musculoskeletal: no muscle tenderness, no joint swelling or tenderness  Extremities: no pedal edema  Neurological/ Psychiatric: AxOx3, Judgement and insight normal;  moving all extremities  Skin: no rashes; no palpable lesions    Labs: all available labs reviewed                        12.1   8.05  )-----------( 290      ( 25 Aug 2020 06:43 )             37.3     08-25    142  |  107  |  17  ----------------------------<  81  3.9   |  28  |  0.74    Ca    9.1      25 Aug 2020 06:43    TPro  7.4  /  Alb  2.9<L>  /  TBili  0.6  /  DBili  x   /  AST  45<H>  /  ALT  53  /  AlkPhos  165<H>  08-24     LIVER FUNCTIONS - ( 24 Aug 2020 17:20 )  Alb: 2.9 g/dL / Pro: 7.4 gm/dL / ALK PHOS: 165 U/L / ALT: 53 U/L / AST: 45 U/L / GGT: x           Urinalysis Basic - ( 24 Aug 2020 22:13 )    Color: Yellow / Appearance: Clear / S.005 / pH: x  Gluc: x / Ketone: Negative  / Bili: Negative / Urobili: Negative mg/dL   Blood: x / Protein: Negative mg/dL / Nitrite: Negative   Leuk Esterase: Negative / RBC: x / WBC x   Sq Epi: x / Non Sq Epi: x / Bacteria: x          Radiology: all available radiological tests reviewed    EXAM:  CTA CHEST PE PROTOCOL (W)AW IC                            PROCEDURE DATE:  2020          INTERPRETATION:  CLINICAL INFORMATION: Shortness of breath, history of DVT    COMPARISON: 2019    PROCEDURE:  CT Angiography of the Chest.  90 ml of Omnipaque 350 was injected intravenously. 10 ml were discarded.  Sagittal and coronal reformats were performed as well as 3D (MIP) reconstructions.    FINDINGS:    LUNGS AND AIRWAYS: Patent central airways.  Minimal pulmonary edema with bilateral lower lobe compressive atelectasis. Biapical pleural parenchymal scarring.  PLEURA: Mild bilateral pleural effusions.  MEDIASTINUM AND CHAYO: No lymphadenopathy.  VESSELS: Slight defect along the periphery of the posterior right upper lobe segmental pulmonary artery may represent a focal acute versus chronic pulmonary embolus. Linear defect in the origin of the left upper lobe pulmonary artery may represent chronic thrombus or pulmonary web. Atherosclerotic changes of the aorta and coronary vasculature. Prior aortic valve replacement.  HEART: Heart is enlarged with right-sided heart enlargement. Reflux of contrast into enlarged hepatic veins suggesting right-sided heart failure. No pericardial effusion.  CHEST WALL AND LOWER NECK: Minimal anasarca.  VISUALIZED UPPER ABDOMEN: Within normal limits.  BONES: Degenerative changes and osteopenia. Old right-sided rib fractures. Mild kyphosis. Superior endplate deformity of T10 of indeterminate age likely chronic in nature. Likely old sternal fracture.    IMPRESSION:  Pulmonary edema with bilateral pleural effusions and bibasilar compressive atelectasis. Additional findings suggesting right-sided heart failure.    Slight defect along the periphery of the posterior right upper lobe segmental pulmonary artery may represent a focal acute versus chronic pulmonary embolus. Linear defect in the origin of the left upper lobe pulmonary artery may represent chronic thrombus or pulmonary web.        Advanced directives addressed: full resuscitation

## 2020-08-25 NOTE — DIETITIAN INITIAL EVALUATION ADULT. - ADD RECOMMEND
1) add ensure enlive once daily and gelatein BID to optimize PO intake 2) daily wt checks to track/trend changes 3) consider checking vitamin D level 4) encourage oral supplements between meals to optimize PO intake

## 2020-08-25 NOTE — DIETITIAN INITIAL EVALUATION ADULT. - NAME AND PHONE
Shara Lira MA, RDN, CDN, [facility]  [phone] (office number)  [phone] (pager number)

## 2020-08-25 NOTE — CONSULT NOTE ADULT - PROBLEM SELECTOR RECOMMENDATION 9
multifactorial , possible covid pneumonia ,  mild acute on chronic diastolic heart failure   , acute PE  continue diuretic , ID evaluation

## 2020-08-25 NOTE — PROGRESS NOTE ADULT - ASSESSMENT
#Acute on chronic diastolic CHF  Admit to telemetry  S/p IV Lasix, will continue with 40mg BID IV  Cardio eval DR Simms  ECHO from previous admission [place]- EF 60-65%  Monitor lytes while on diuresis  COVID Ab (+) 8/1  COVID PCR (+) 8/24  CXR: Impression: small BILATERAL pleural effusions with underlying atelectasis    #PE  Patient previously discharged off ELiquis due to high risk of falls  Eliquis resumed at [facility] on 8/14 at 10mg BID  Will continue 5mg BID as after 7 days of initial loading dose  CT angio: Slight defect along the periphery of the posterior right upper lobe segmental pulmonary artery may represent a focal acute versus chronic pulmonary embolus. Linear defect in the origin of the left upper lobe pulmonary artery may represent chronic thrombus or pulmonary web.    #Afib with RVR  S/p Metoprolol PO  Will continue BB and cardizem  Adjust dose further to control the rate  AC by Eliquis    #agitation likely 2 to sundowning  Haldol and Ativan prn  Increase Seroquel to 25mg QHS    #CAD s/p stent  #S/p TAVR  Continue Aspirin    #COVID pending, low suspicion    #DVT proph- on Eliquis #Acute on chronic diastolic CHF  Admit to telemetry  S/p IV Lasix, will continue with 40mg BID IV  Cardio eval DR Simms  ECHO from previous admission [place]- EF 60-65%  Monitor lytes while on diuresis  COVID Ab (+) 8/1  COVID PCR (+) 8/24  CXR: Impression: small BILATERAL pleural effusions with underlying atelectasis    #PE  Patient previously discharged off ELiquis due to high risk of falls  Eliquis resumed at [facility] on 8/14 at 10mg BID  Will continue 5mg BID as after 7 days of initial loading dose  CT angio: Slight defect along the periphery of the posterior right upper lobe segmental pulmonary artery may represent a focal acute versus chronic pulmonary embolus. Linear defect in the origin of the left upper lobe pulmonary artery may represent chronic thrombus or pulmonary web.    #Afib with RVR  S/p Metoprolol PO  Will continue BB and cardizem  Adjust dose further to control the rate  AC by Eliquis    #agitation likely 2 to sundowning  Haldol and Ativan prn  Increase Seroquel to 25mg QHS    #CAD s/p stent  #S/p TAVR  Continue Aspirin    #COVID pending, low suspicion    #Increased Nutrient Needs  1) add ensure enlive once daily and gelatein BID to optimize PO intake 2) daily wt checks to track/trend changes 3) consider checking vitamin D level 4) encourage oral supplements between meals to optimize PO intake    #DVT proph- on Eliquis

## 2020-08-25 NOTE — DIETITIAN INITIAL EVALUATION ADULT. - PERTINENT LABORATORY DATA
08-25 Na142 mmol/L Glu 81 mg/dL K+ 3.9 mmol/L Cr  0.74 mg/dL BUN 17 mg/dL Phos n/a   Alb n/a   PAB n/a

## 2020-08-26 LAB
ALBUMIN SERPL ELPH-MCNC: 2.7 G/DL — LOW (ref 3.3–5)
ALP SERPL-CCNC: 141 U/L — HIGH (ref 40–120)
ALT FLD-CCNC: 39 U/L — SIGNIFICANT CHANGE UP (ref 12–78)
ANION GAP SERPL CALC-SCNC: 5 MMOL/L — SIGNIFICANT CHANGE UP (ref 5–17)
AST SERPL-CCNC: 23 U/L — SIGNIFICANT CHANGE UP (ref 15–37)
BILIRUB SERPL-MCNC: 0.7 MG/DL — SIGNIFICANT CHANGE UP (ref 0.2–1.2)
BUN SERPL-MCNC: 20 MG/DL — SIGNIFICANT CHANGE UP (ref 7–23)
CALCIUM SERPL-MCNC: 9.2 MG/DL — SIGNIFICANT CHANGE UP (ref 8.5–10.1)
CHLORIDE SERPL-SCNC: 102 MMOL/L — SIGNIFICANT CHANGE UP (ref 96–108)
CO2 SERPL-SCNC: 31 MMOL/L — SIGNIFICANT CHANGE UP (ref 22–31)
CREAT SERPL-MCNC: 0.86 MG/DL — SIGNIFICANT CHANGE UP (ref 0.5–1.3)
GLUCOSE SERPL-MCNC: 85 MG/DL — SIGNIFICANT CHANGE UP (ref 70–99)
HCT VFR BLD CALC: 41.8 % — SIGNIFICANT CHANGE UP (ref 34.5–45)
HGB BLD-MCNC: 13.5 G/DL — SIGNIFICANT CHANGE UP (ref 11.5–15.5)
MCHC RBC-ENTMCNC: 28.4 PG — SIGNIFICANT CHANGE UP (ref 27–34)
MCHC RBC-ENTMCNC: 32.3 GM/DL — SIGNIFICANT CHANGE UP (ref 32–36)
MCV RBC AUTO: 87.8 FL — SIGNIFICANT CHANGE UP (ref 80–100)
PLATELET # BLD AUTO: 317 K/UL — SIGNIFICANT CHANGE UP (ref 150–400)
POTASSIUM SERPL-MCNC: 3.8 MMOL/L — SIGNIFICANT CHANGE UP (ref 3.5–5.3)
POTASSIUM SERPL-SCNC: 3.8 MMOL/L — SIGNIFICANT CHANGE UP (ref 3.5–5.3)
PROT SERPL-MCNC: 6.9 GM/DL — SIGNIFICANT CHANGE UP (ref 6–8.3)
RBC # BLD: 4.76 M/UL — SIGNIFICANT CHANGE UP (ref 3.8–5.2)
RBC # FLD: 16 % — HIGH (ref 10.3–14.5)
SODIUM SERPL-SCNC: 138 MMOL/L — SIGNIFICANT CHANGE UP (ref 135–145)
WBC # BLD: 9.61 K/UL — SIGNIFICANT CHANGE UP (ref 3.8–10.5)
WBC # FLD AUTO: 9.61 K/UL — SIGNIFICANT CHANGE UP (ref 3.8–10.5)

## 2020-08-26 PROCEDURE — 99233 SBSQ HOSP IP/OBS HIGH 50: CPT

## 2020-08-26 PROCEDURE — 99232 SBSQ HOSP IP/OBS MODERATE 35: CPT

## 2020-08-26 RX ORDER — QUETIAPINE FUMARATE 200 MG/1
12.5 TABLET, FILM COATED ORAL AT BEDTIME
Refills: 0 | Status: DISCONTINUED | OUTPATIENT
Start: 2020-08-26 | End: 2020-08-31

## 2020-08-26 RX ORDER — FUROSEMIDE 40 MG
40 TABLET ORAL DAILY
Refills: 0 | Status: DISCONTINUED | OUTPATIENT
Start: 2020-08-27 | End: 2020-08-27

## 2020-08-26 RX ADMIN — Medication 40 MILLIGRAM(S): at 10:41

## 2020-08-26 RX ADMIN — Medication 30 MILLIGRAM(S): at 18:02

## 2020-08-26 RX ADMIN — APIXABAN 5 MILLIGRAM(S): 2.5 TABLET, FILM COATED ORAL at 23:20

## 2020-08-26 RX ADMIN — Medication 81 MILLIGRAM(S): at 10:41

## 2020-08-26 RX ADMIN — SPIRONOLACTONE 12.5 MILLIGRAM(S): 25 TABLET, FILM COATED ORAL at 10:41

## 2020-08-26 RX ADMIN — APIXABAN 5 MILLIGRAM(S): 2.5 TABLET, FILM COATED ORAL at 10:41

## 2020-08-26 RX ADMIN — Medication 1 TABLET(S): at 10:41

## 2020-08-26 RX ADMIN — Medication 30 MILLIGRAM(S): at 06:33

## 2020-08-26 NOTE — PROGRESS NOTE ADULT - PROBLEM SELECTOR PLAN 2
Pt appears euvolemic at this time. Would reduce Lasix daily and then discontinue. Monitor labs, sats and clinical status

## 2020-08-26 NOTE — PROGRESS NOTE ADULT - ASSESSMENT
#Acute on chronic diastolic CHF  # Prior covid exposure/abb positive    telemetry  S/p IV Lasix, will continue with 40mg BID IV  Cardio eval appreciatd   ECHO from previous admission [place]- EF 60-65%  Monitor lytes while on diuresis  COVID Ab (+) 8/1  COVID PCR (+) 8/24  CXR: Impression: small BILATERAL pleural effusions with underlying atelectasis    #PE  Patient previously discharged off ELiquis due to high risk of falls  Eliquis resumed at [facility] on 8/14 at 10mg BID  Will continue 5mg BID as after 7 days of initial loading dose  CT angio: Slight defect along the periphery of the posterior right upper lobe segmental pulmonary artery may represent a focal acute versus chronic pulmonary embolus. Linear defect in the origin of the left upper lobe pulmonary artery may represent chronic thrombus or pulmonary web.    #Afib with RVR  S/p Metoprolol PO  Will continue BB and cardizem  Adjust dose further to control the rate  AC by Eliquis    #agitation likely 2 to sundowning  Haldol and Ativan prn  Increase Seroquel to 25mg QHS    #CAD s/p stent  #S/p TAVR  Continue Aspirin    #COVID pending, low suspicion    #Increased Nutrient Needs  1) add ensure enlive once daily and gelatein BID to optimize PO intake 2) daily wt checks to track/trend changes 3) consider checking vitamin D level 4) encourage oral supplements between meals to optimize PO intake    #DVT proph- on Eliquis

## 2020-08-26 NOTE — PROGRESS NOTE ADULT - SUBJECTIVE AND OBJECTIVE BOX
PCP:    REQUESTING PHYSICIAN:    REASON FOR CONSULT:    CHIEF COMPLAINT:    HPI:  86yo/F with PMH chronic afib (discharged from [place] on 8/6/20 off AC due to high risk of falls but Eliquis resumed    at [facility] on 8/14/20), s/p TAVR, CAD s/p stent, chr diastolic CHF EF 60-65%, SCC, dementia presented with worsening SOB form [facility]. Patient is unable to provide any history as very confused and agitated at present. Patient found to be in acute CHF in ED and given Lasix 40mg IV. Telemetry- afib at 110-120's.  patient was noted to  covid positive , patient is comfortable  , 2 L NC oxygen ,   patient blood pressure is controlled   8/26/20: Pt very lethargic on exam. atrial fibrillation acceptable rate.     PAST MEDICAL & SURGICAL HISTORY:  Atrial fibrillation  CAD (coronary artery disease)  Kyphoscoliosis  Aortic stenosis: s/p TAVR  SCC (squamous cell carcinoma)  Varicose vein of leg  Mitral valve disease  Hypertension  S/P TAVR (transcatheter aortic valve replacement)  History of PTCA: one stent  After-cataract of both eyes  H/O colonoscopy  H/O varicose vein stripping      SOCIAL HISTORY:    FAMILY HISTORY:  No pertinent family history in first degree relatives      ALLERGIES:  Allergies    No Known Allergies    Intolerances        MEDICATIONS:    MEDICATIONS  (STANDING):  apixaban 5 milliGRAM(s) Oral every 12 hours  aspirin enteric coated 81 milliGRAM(s) Oral daily  diltiazem    Tablet 30 milliGRAM(s) Oral every 6 hours  furosemide   Injectable 40 milliGRAM(s) IV Push two times a day  metoprolol tartrate 100 milliGRAM(s) Oral two times a day  multivitamin 1 Tablet(s) Oral daily  QUEtiapine 25 milliGRAM(s) Oral at bedtime  spironolactone 12.5 milliGRAM(s) Oral daily    MEDICATIONS  (PRN):  acetaminophen   Tablet .. 650 milliGRAM(s) Oral every 4 hours PRN Mild Pain (1 - 3)  aluminum hydroxide/magnesium hydroxide/simethicone Suspension 30 milliLiter(s) Oral every 4 hours PRN Dyspepsia  haloperidol    Injectable 2.5 milliGRAM(s) IntraMuscular every 6 hours PRN severe agiation  LORazepam   Injectable 1 milliGRAM(s) IV Push every 3 hours PRN Agitation  ondansetron Injectable 4 milliGRAM(s) IV Push every 6 hours PRN Nausea  senna 2 Tablet(s) Oral at bedtime PRN Constipation          Vital Signs Last 24 Hrs  T(C): 37 (26 Aug 2020 11:43), Max: 37 (26 Aug 2020 11:43)  T(F): 98.6 (26 Aug 2020 11:43), Max: 98.6 (26 Aug 2020 11:43)  HR: 94 (26 Aug 2020 14:00) (68 - 96)  BP: 96/72 (26 Aug 2020 14:00) (90/53 - 129/99)  BP(mean): 77 (26 Aug 2020 14:00) (61 - 105)  RR: 2 (26 Aug 2020 10:00) (2 - 27)  SpO2: 98% (26 Aug 2020 14:00) (89% - 100%)Daily     Daily I&O's Summary    25 Aug 2020 07:01  -  26 Aug 2020 07:00  --------------------------------------------------------  IN: 450 mL / OUT: 2300 mL / NET: -1850 mL        PHYSICAL EXAM:    Constitutional: NAD,lethargic  HEENT: PERR, EOMI,  No oral cyananosis.  Neck:  supple,  No JVD  Respiratory: Breath sounds are clear bilaterally, No wheezing, rales or rhonchi  Cardiovascular: S1 and S2, irregular  Gastrointestinal: Bowel Sounds present, soft, nontender.   Extremities: No peripheral edema. No clubbing or cyanosis.  Vascular: 2+ peripheral pulses  Neurological: A/O x 3, no focal deficits  Musculoskeletal: no calf tenderness.  Skin: No rashes.      LABS: All Labs Reviewed:                        13.5   9.61  )-----------( 317      ( 26 Aug 2020 06:41 )             41.8                         12.1   8.05  )-----------( 290      ( 25 Aug 2020 06:43 )             37.3                         13.0   10.06 )-----------( 331      ( 24 Aug 2020 17:20 )             41.1     26 Aug 2020 06:41    138    |  102    |  20     ----------------------------<  85     3.8     |  31     |  0.86   25 Aug 2020 06:43    142    |  107    |  17     ----------------------------<  81     3.9     |  28     |  0.74   24 Aug 2020 17:20    138    |  105    |  23     ----------------------------<  92     4.9     |  26     |  1.08     Ca    9.2        26 Aug 2020 06:41  Ca    9.1        25 Aug 2020 06:43  Ca    9.3        24 Aug 2020 17:20    TPro  6.9    /  Alb  2.7    /  TBili  0.7    /  DBili  x      /  AST  23     /  ALT  39     /  AlkPhos  141    26 Aug 2020 06:41  TPro  7.4    /  Alb  2.9    /  TBili  0.6    /  DBili  x      /  AST  45     /  ALT  53     /  AlkPhos  165    24 Aug 2020 17:20    PT/INR - ( 24 Aug 2020 17:20 )   PT: 24.2 sec;   INR: 2.14 ratio         PTT - ( 24 Aug 2020 17:20 )  PTT:33.5 sec  CARDIAC MARKERS ( 24 Aug 2020 17:20 )  <0.015 ng/mL / x     / x     / x     / x          Blood Culture: Organism --  Gram Stain Blood -- Gram Stain --  Specimen Source .Urine None  Culture-Blood --      08-24 @ 17:20  Pro Bnp [id number]        RADIOLOGY/EKG:< from: 12 Lead ECG (08.24.20 @ 17:03) >  Diagnosis Line Atrial fibrillation  Nonspecific T wave abnormality  Abnormal ECG  No previous ECGs available  Confirmed by Chava Medeiros ([id number]) on 8/25/2020 12:00:24 PM    < end of copied text >        ECHO/CARDIAC CATHTERIZATION/STRESS TEST:  < from: TTE Echo Complete w/o Contrast w/ Doppler (08.01.20 @ 13:29) >  Summary:   1. Left ventricular ejection fraction, by visual estimation, is 60 to 65%.   2. The mitral in-flow pattern reveals no discernable A-wave, therefore no comment on diastolic function can be made.   3. Thickening and calcification of the anterior and posterior mitral valve leaflets.   4. Moderate tricuspid regurgitation.   5. TAVR Ben valve in aortic position.   6. Estimated pulmonary artery systolic pressure is 42.1 mmHg assuming a right atrial pressure of 8 mmHg, which is consistent with mild pulmonary hypertension.   7. The aortic valve mean gradient is 9.3 mmHg consistent with normally opening aortic valve.    MD Izzy Electronically signed on 8/1/2020 at 7:11:01 PM        < end of copied text >

## 2020-08-26 NOTE — PROGRESS NOTE ADULT - SUBJECTIVE AND OBJECTIVE BOX
86yo/F with PMH chronic afib (discharged from [place] on 20 off AC due to high risk of falls but Eliquis resumed at full dose of 10mg BID at [facility] on 20), s/p TAVR, CAD s/p stent, chr diastolic CHF EF 60-65%, SCC, dementia presented with worsening SOB form [facility]. Patient is unable to provide any history as very confused and agitated at present. Patient found to be in acute CHF in ED and given Lasix 40mg IV. Telemetry- afib at 110-120's.   - rate controlled on tele, confused    Review of Systems:  Other Review of Systems: All other review of systems negative, except as noted in HPI  Physical Exam:  · Constitutional	NAD, confused   · Neck	No bruits; no thyromegaly or nodules  · Respiratory	detailed exam  · Respiratory Comments	unable to fully cooperate  · Cardiovascular	detailed exam  · Cardiovascular Details	irregular  rhythm  · Gastrointestinal	Soft, non-tender, no hepatosplenomegaly, normal bowel sounds  ·   · Extremities	No cyanosis, clubbing or edema  · Neurological	detailed exam  · Mental Status	confused and agitated          PHYSICAL EXAM:    Daily     Daily Weight in k (25 Aug 2020 14:39)    ICU Vital Signs Last 24 Hrs  T(C): 36.8 (25 Aug 2020 22:28), Max: 36.8 (25 Aug 2020 22:28)  T(F): 98.2 (25 Aug 2020 22:28), Max: 98.2 (25 Aug 2020 22:28)  HR: 78 (26 Aug 2020 06:00) (67 - 110)  BP: 116/70 (26 Aug 2020 06:00) (94/68 - 129/99)  BP(mean): 82 (26 Aug 2020 06:00) (71 - 105)  ABP: --  ABP(mean): --  RR: 15 (26 Aug 2020 06:00) (15 - 27)  SpO2: 99% (26 Aug 2020 06:00) (89% - 99%)                                13.5   9.61  )-----------( 317      ( 26 Aug 2020 06:41 )             41.8       CBC Full  -  ( 26 Aug 2020 06:41 )  WBC Count : 9.61 K/uL  RBC Count : 4.76 M/uL  Hemoglobin : 13.5 g/dL  Hematocrit : 41.8 %  Platelet Count - Automated : 317 K/uL  Mean Cell Volume : 87.8 fl  Mean Cell Hemoglobin : 28.4 pg  Mean Cell Hemoglobin Concentration : 32.3 gm/dL  Auto Neutrophil # : x  Auto Lymphocyte # : x  Auto Monocyte # : x  Auto Eosinophil # : x  Auto Basophil # : x  Auto Neutrophil % : x  Auto Lymphocyte % : x  Auto Monocyte % : x  Auto Eosinophil % : x  Auto Basophil % : x          138  |  102  |  20  ----------------------------<  85  3.8   |  31  |  0.86    Ca    9.2      26 Aug 2020 06:41    TPro  6.9  /  Alb  2.7<L>  /  TBili  0.7  /  DBili  x   /  AST  23  /  ALT  39  /  AlkPhos  141<H>        LIVER FUNCTIONS - ( 26 Aug 2020 06:41 )  Alb: 2.7 g/dL / Pro: 6.9 gm/dL / ALK PHOS: 141 U/L / ALT: 39 U/L / AST: 23 U/L / GGT: x             PT/INR - ( 24 Aug 2020 17:20 )   PT: 24.2 sec;   INR: 2.14 ratio         PTT - ( 24 Aug 2020 17:20 )  PTT:33.5 sec    CARDIAC MARKERS ( 24 Aug 2020 17:20 )  <0.015 ng/mL / x     / x     / x     / x            Urinalysis Basic - ( 24 Aug 2020 22:13 )    Color: Yellow / Appearance: Clear / S.005 / pH: x  Gluc: x / Ketone: Negative  / Bili: Negative / Urobili: Negative mg/dL   Blood: x / Protein: Negative mg/dL / Nitrite: Negative   Leuk Esterase: Negative / RBC: x / WBC x   Sq Epi: x / Non Sq Epi: x / Bacteria: x            MEDICATIONS  (STANDING):  apixaban 5 milliGRAM(s) Oral every 12 hours  aspirin enteric coated 81 milliGRAM(s) Oral daily  diltiazem    Tablet 30 milliGRAM(s) Oral every 6 hours  furosemide   Injectable 40 milliGRAM(s) IV Push two times a day  metoprolol tartrate 100 milliGRAM(s) Oral two times a day  multivitamin 1 Tablet(s) Oral daily  QUEtiapine 25 milliGRAM(s) Oral at bedtime  spironolactone 12.5 milliGRAM(s) Oral daily

## 2020-08-27 LAB
-  AMIKACIN: SIGNIFICANT CHANGE UP
-  AMOXICILLIN/CLAVULANIC ACID: SIGNIFICANT CHANGE UP
-  AMPICILLIN/SULBACTAM: SIGNIFICANT CHANGE UP
-  AMPICILLIN: SIGNIFICANT CHANGE UP
-  AZTREONAM: SIGNIFICANT CHANGE UP
-  CEFAZOLIN: SIGNIFICANT CHANGE UP
-  CEFEPIME: SIGNIFICANT CHANGE UP
-  CEFOXITIN: SIGNIFICANT CHANGE UP
-  CEFTRIAXONE: SIGNIFICANT CHANGE UP
-  CIPROFLOXACIN: SIGNIFICANT CHANGE UP
-  ERTAPENEM: SIGNIFICANT CHANGE UP
-  GENTAMICIN: SIGNIFICANT CHANGE UP
-  IMIPENEM: SIGNIFICANT CHANGE UP
-  LEVOFLOXACIN: SIGNIFICANT CHANGE UP
-  MEROPENEM: SIGNIFICANT CHANGE UP
-  NITROFURANTOIN: SIGNIFICANT CHANGE UP
-  PIPERACILLIN/TAZOBACTAM: SIGNIFICANT CHANGE UP
-  TIGECYCLINE: SIGNIFICANT CHANGE UP
-  TOBRAMYCIN: SIGNIFICANT CHANGE UP
-  TRIMETHOPRIM/SULFAMETHOXAZOLE: SIGNIFICANT CHANGE UP
ANION GAP SERPL CALC-SCNC: 5 MMOL/L — SIGNIFICANT CHANGE UP (ref 5–17)
BASOPHILS # BLD AUTO: 0.1 K/UL — SIGNIFICANT CHANGE UP (ref 0–0.2)
BASOPHILS NFR BLD AUTO: 1 % — SIGNIFICANT CHANGE UP (ref 0–2)
BUN SERPL-MCNC: 24 MG/DL — HIGH (ref 7–23)
CALCIUM SERPL-MCNC: 9.4 MG/DL — SIGNIFICANT CHANGE UP (ref 8.5–10.1)
CHLORIDE SERPL-SCNC: 102 MMOL/L — SIGNIFICANT CHANGE UP (ref 96–108)
CO2 SERPL-SCNC: 29 MMOL/L — SIGNIFICANT CHANGE UP (ref 22–31)
CREAT SERPL-MCNC: 0.69 MG/DL — SIGNIFICANT CHANGE UP (ref 0.5–1.3)
CULTURE RESULTS: SIGNIFICANT CHANGE UP
EOSINOPHIL # BLD AUTO: 0.26 K/UL — SIGNIFICANT CHANGE UP (ref 0–0.5)
EOSINOPHIL NFR BLD AUTO: 2.6 % — SIGNIFICANT CHANGE UP (ref 0–6)
GLUCOSE SERPL-MCNC: 87 MG/DL — SIGNIFICANT CHANGE UP (ref 70–99)
HCT VFR BLD CALC: 42.4 % — SIGNIFICANT CHANGE UP (ref 34.5–45)
HGB BLD-MCNC: 13.6 G/DL — SIGNIFICANT CHANGE UP (ref 11.5–15.5)
IMM GRANULOCYTES NFR BLD AUTO: 0.7 % — SIGNIFICANT CHANGE UP (ref 0–1.5)
LYMPHOCYTES # BLD AUTO: 2.21 K/UL — SIGNIFICANT CHANGE UP (ref 1–3.3)
LYMPHOCYTES # BLD AUTO: 22.4 % — SIGNIFICANT CHANGE UP (ref 13–44)
MCHC RBC-ENTMCNC: 28.5 PG — SIGNIFICANT CHANGE UP (ref 27–34)
MCHC RBC-ENTMCNC: 32.1 GM/DL — SIGNIFICANT CHANGE UP (ref 32–36)
MCV RBC AUTO: 88.7 FL — SIGNIFICANT CHANGE UP (ref 80–100)
METHOD TYPE: SIGNIFICANT CHANGE UP
MONOCYTES # BLD AUTO: 1.6 K/UL — HIGH (ref 0–0.9)
MONOCYTES NFR BLD AUTO: 16.2 % — HIGH (ref 2–14)
NEUTROPHILS # BLD AUTO: 5.62 K/UL — SIGNIFICANT CHANGE UP (ref 1.8–7.4)
NEUTROPHILS NFR BLD AUTO: 57.1 % — SIGNIFICANT CHANGE UP (ref 43–77)
ORGANISM # SPEC MICROSCOPIC CNT: SIGNIFICANT CHANGE UP
ORGANISM # SPEC MICROSCOPIC CNT: SIGNIFICANT CHANGE UP
PLATELET # BLD AUTO: 336 K/UL — SIGNIFICANT CHANGE UP (ref 150–400)
POTASSIUM SERPL-MCNC: 4 MMOL/L — SIGNIFICANT CHANGE UP (ref 3.5–5.3)
POTASSIUM SERPL-SCNC: 4 MMOL/L — SIGNIFICANT CHANGE UP (ref 3.5–5.3)
RBC # BLD: 4.78 M/UL — SIGNIFICANT CHANGE UP (ref 3.8–5.2)
RBC # FLD: 15.9 % — HIGH (ref 10.3–14.5)
SODIUM SERPL-SCNC: 136 MMOL/L — SIGNIFICANT CHANGE UP (ref 135–145)
SPECIMEN SOURCE: SIGNIFICANT CHANGE UP
WBC # BLD: 9.86 K/UL — SIGNIFICANT CHANGE UP (ref 3.8–10.5)
WBC # FLD AUTO: 9.86 K/UL — SIGNIFICANT CHANGE UP (ref 3.8–10.5)

## 2020-08-27 PROCEDURE — 71045 X-RAY EXAM CHEST 1 VIEW: CPT | Mod: 26

## 2020-08-27 PROCEDURE — 99233 SBSQ HOSP IP/OBS HIGH 50: CPT

## 2020-08-27 PROCEDURE — 99232 SBSQ HOSP IP/OBS MODERATE 35: CPT

## 2020-08-27 RX ADMIN — Medication 30 MILLIGRAM(S): at 23:29

## 2020-08-27 RX ADMIN — APIXABAN 5 MILLIGRAM(S): 2.5 TABLET, FILM COATED ORAL at 23:29

## 2020-08-27 RX ADMIN — SPIRONOLACTONE 12.5 MILLIGRAM(S): 25 TABLET, FILM COATED ORAL at 13:21

## 2020-08-27 RX ADMIN — Medication 81 MILLIGRAM(S): at 13:23

## 2020-08-27 RX ADMIN — APIXABAN 5 MILLIGRAM(S): 2.5 TABLET, FILM COATED ORAL at 13:19

## 2020-08-27 RX ADMIN — Medication 1 TABLET(S): at 13:20

## 2020-08-27 RX ADMIN — Medication 30 MILLIGRAM(S): at 00:00

## 2020-08-27 RX ADMIN — Medication 30 MILLIGRAM(S): at 17:15

## 2020-08-27 RX ADMIN — Medication 30 MILLIGRAM(S): at 06:06

## 2020-08-27 NOTE — PROGRESS NOTE ADULT - PROBLEM SELECTOR PLAN 1
Multifactorial (pulmonary edema, COVID infection, AF with RVR, PE) -- improved; continue to diurese with IV Lasix, anticoagulation.

## 2020-08-27 NOTE — PROGRESS NOTE ADULT - SUBJECTIVE AND OBJECTIVE BOX
REASON FOR VISIT: AF    HPI:  85 year old woman with a history of chronic atrial fibrillation, aortic stenosis s/p TAVR, CAD, coronary stent, chronic diastolic HF, cancer (squamous cell carcinoma), and dementia admitted on 8/24/20 with COVID-19 infection, pulmonary edema.    8/26/20: Pt very lethargic on exam. atrial fibrillation acceptable rate.   8/27/20:  Awake and interactive; feels "better."  Denies dyspnea.    MEDICATIONS  (STANDING):  apixaban 5 milliGRAM(s) Oral every 12 hours  aspirin enteric coated 81 milliGRAM(s) Oral daily  diltiazem    Tablet 30 milliGRAM(s) Oral every 6 hours  furosemide   Injectable 40 milliGRAM(s) IV Push daily  metoprolol tartrate 100 milliGRAM(s) Oral two times a day  multivitamin 1 Tablet(s) Oral daily  QUEtiapine 12.5 milliGRAM(s) Oral at bedtime  spironolactone 12.5 milliGRAM(s) Oral daily    Vital Signs Last 24 Hrs  T(C): 36.1 (27 Aug 2020 05:11), Max: 37.1 (27 Aug 2020 00:34)  T(F): 97 (27 Aug 2020 05:11), Max: 98.8 (27 Aug 2020 00:34)  HR: 85 (27 Aug 2020 08:00) (78 - 112)  BP: 100/54 (27 Aug 2020 08:00) (89/53 - 111/76)  BP(mean): 62 (27 Aug 2020 08:00) (60 - 84)  RR: 18  SpO2: 100% (27 Aug 2020 08:00) (96% - 100%)    PHYSICAL EXAM:  Constitutional: Appears frail, semirecumbent in bed, no distress  Respiratory: Breath sounds are clear bilaterally, No wheezing, rales or rhonchi  Cardiovascular: Irregularly irregular, normal S2  Gastrointestinal: Bowel Sounds present, soft, nontender.   Extremities: No pedal edema.     LABS:                  13.6   9.86  )-----------( 336      ( 27 Aug 2020 06:35 )             42.4     136  |  102  |  24<H>  ----------------------------<  87  4.0   |  29  |  0.69    Ca    9.4      27 Aug 2020 06:35    TPro  6.9  /  Alb  2.7<L>  /  TBili  0.7  /  DBili  x   /  AST  23  /  ALT  39  /  AlkPhos  141<H>  08-26    12 Lead ECG (08.24.20 @ 17:03):  Atrial fibrillation, Nonspecific T wave abnormality.    TTE Echo Complete w/o Contrast w/ Doppler (08.01.20 @ 13:29):   1. Left ventricular ejection fraction, by visual estimation, is 60 to 65%.   2. The mitral in-flow pattern reveals no discernable A-wave, therefore no comment on diastolic function can be made.   3. Thickening and calcification of the anterior and posterior mitral valve leaflets.   4. Moderate tricuspid regurgitation.   5. TAVR Ben valve in aortic position.   6. Estimated pulmonary artery systolic pressure is 42.1 mmHg assuming a right atrial pressure of 8 mmHg, which is consistent with mild pulmonary hypertension.   7. The aortic valve mean gradient is 9.3 mmHg consistent with normally opening aortic valve.    CT Angio Chest PE Protocol w/ IV Cont (08.24.20 @ 18:41):  Pulmonary edema with bilateral pleural effusions and bibasilar compressive atelectasis. Additional findings suggesting right-sided heart failure.  Slight defect along the periphery of the posterior right upper lobe segmentalpulmonary artery may represent a focal acute versus chronic pulmonary embolus. Linear defect in the origin of the left upper lobe pulmonary artery may represent chronic thrombus or pulmonary web.    Tele: AF

## 2020-08-27 NOTE — PROGRESS NOTE ADULT - SUBJECTIVE AND OBJECTIVE BOX
86yo/F with PMH chronic afib (discharged from [place] on 20 off AC due to high risk of falls but Eliquis resumed at full dose of 10mg BID at [facility] on 20), s/p TAVR, CAD s/p stent, chr diastolic CHF EF 60-65%, SCC, dementia presented with worsening SOB form [facility]. Patient is unable to provide any history as very confused and agitated at present. Patient found to be in acute CHF in ED and given Lasix 40mg IV. Telemetry- afib at 110-120's.   - rate controlled on tele, more awake and alert today    Review of Systems:  Other Review of Systems: All other review of systems negative, except as noted in HPI  Physical Exam:  · Constitutional	NAD, confused   · Neck	No bruits; no thyromegaly or nodules  · Respiratory	detailed exam  · Respiratory Comments	unable to fully cooperate  · Cardiovascular	detailed exam  · Cardiovascular Details	irregular  rhythm  · Gastrointestinal	Soft, non-tender, no hepatosplenomegaly, normal bowel sounds  ·   · Extremities	No cyanosis, clubbing or edema  · Neurological	detailed exam  · Mental Status	confused and agitated        PHYSICAL EXAM:    Daily     Daily Weight in k.5 (27 Aug 2020 05:11)    ICU Vital Signs Last 24 Hrs  T(C): 36.4 (27 Aug 2020 11:31), Max: 37.1 (27 Aug 2020 00:34)  T(F): 97.6 (27 Aug 2020 11:31), Max: 98.8 (27 Aug 2020 00:34)  HR: 98 (27 Aug 2020 14:00) (78 - 112)  BP: 103/62 (27 Aug 2020 14:00) (89/53 - 111/76)  BP(mean): 73 (27 Aug 2020 14:00) (60 - 84)  ABP: --  ABP(mean): --  RR: --  SpO2: 98% (27 Aug 2020 14:00) (96% - 100%)                                  13.6   9.86  )-----------( 336      ( 27 Aug 2020 06:35 )             42.4       CBC Full  -  ( 27 Aug 2020 06:35 )  WBC Count : 9.86 K/uL  RBC Count : 4.78 M/uL  Hemoglobin : 13.6 g/dL  Hematocrit : 42.4 %  Platelet Count - Automated : 336 K/uL  Mean Cell Volume : 88.7 fl  Mean Cell Hemoglobin : 28.5 pg  Mean Cell Hemoglobin Concentration : 32.1 gm/dL  Auto Neutrophil # : 5.62 K/uL  Auto Lymphocyte # : 2.21 K/uL  Auto Monocyte # : 1.60 K/uL  Auto Eosinophil # : 0.26 K/uL  Auto Basophil # : 0.10 K/uL  Auto Neutrophil % : 57.1 %  Auto Lymphocyte % : 22.4 %  Auto Monocyte % : 16.2 %  Auto Eosinophil % : 2.6 %  Auto Basophil % : 1.0 %          136  |  102  |  24<H>  ----------------------------<  87  4.0   |  29  |  0.69    Ca    9.4      27 Aug 2020 06:35    TPro  6.9  /  Alb  2.7<L>  /  TBili  0.7  /  DBili  x   /  AST  23  /  ALT  39  /  AlkPhos  141<H>        LIVER FUNCTIONS - ( 26 Aug 2020 06:41 )  Alb: 2.7 g/dL / Pro: 6.9 gm/dL / ALK PHOS: 141 U/L / ALT: 39 U/L / AST: 23 U/L / GGT: x                               MEDICATIONS  (STANDING):  apixaban 5 milliGRAM(s) Oral every 12 hours  aspirin enteric coated 81 milliGRAM(s) Oral daily  diltiazem    Tablet 30 milliGRAM(s) Oral every 6 hours  metoprolol tartrate 100 milliGRAM(s) Oral two times a day  multivitamin 1 Tablet(s) Oral daily  QUEtiapine 12.5 milliGRAM(s) Oral at bedtime  spironolactone 12.5 milliGRAM(s) Oral daily

## 2020-08-27 NOTE — PROGRESS NOTE ADULT - ASSESSMENT
8/27  Acute on chronic diastolic CHF exacerbation improved - clinically now compensated   Prior COVID infection/positive for abs, no PNA   Afib RVR , now rate controlled   PE eliquis started in [facility]   Hx Afib , TAVR, CADstent  SCC  ? dementia  ecoli urinary colonisation/ UA wnl     stop lasix due to hypotension   continue meds for rate control  monitor off abx per ID   PT eval for discharge planning         #Acute on chronic diastolic CHF  # Prior covid exposure/abb positive    telemetry  S/p IV Lasix, will continue with 40mg BID IV  Cardio eval appreciatd   ECHO from previous admission [place]- EF 60-65%  Monitor lytes while on diuresis  COVID Ab (+) 8/1  COVID PCR (+) 8/24  CXR: Impression: small BILATERAL pleural effusions with underlying atelectasis    #PE  Patient previously discharged off ELiquis due to high risk of falls  Eliquis resumed at [facility] on 8/14 at 10mg BIDWill continue 5mg BID as after 7 days of initial loading dose  CT angio: Slight defect along the periphery of the posterior right upper lobe segmental pulmonary artery may represent a focal acute versus chronic pulmonary embolus. Linear defect in the origin of the left upper lobe pulmonary artery may represent chronic thrombus or pulmonary web.    #Afib with RVR  S/p Metoprolol PO  Will continue BB and cardizem  Adjust dose further to control the rate  AC by Eliquis    #agitation likely 2 to sundowning  Haldol and Ativan prn  Increase Seroquel to 25mg QHS    #CAD s/p stent  #S/p TAVR  Continue Aspirin    #COVID pending, low suspicion    #Increased Nutrient Needs  1) add ensure enlive once daily and gelatein BID to optimize PO intake 2) daily wt checks to track/trend changes 3) consider checking vitamin D level 4) encourage oral supplements between meals to optimize PO intake#DVT proph- on Eliquis

## 2020-08-28 LAB
ANION GAP SERPL CALC-SCNC: 5 MMOL/L — SIGNIFICANT CHANGE UP (ref 5–17)
BASOPHILS # BLD AUTO: 0.08 K/UL — SIGNIFICANT CHANGE UP (ref 0–0.2)
BASOPHILS NFR BLD AUTO: 0.7 % — SIGNIFICANT CHANGE UP (ref 0–2)
BUN SERPL-MCNC: 24 MG/DL — HIGH (ref 7–23)
CALCIUM SERPL-MCNC: 9.4 MG/DL — SIGNIFICANT CHANGE UP (ref 8.5–10.1)
CHLORIDE SERPL-SCNC: 104 MMOL/L — SIGNIFICANT CHANGE UP (ref 96–108)
CO2 SERPL-SCNC: 27 MMOL/L — SIGNIFICANT CHANGE UP (ref 22–31)
CREAT SERPL-MCNC: 0.72 MG/DL — SIGNIFICANT CHANGE UP (ref 0.5–1.3)
EOSINOPHIL # BLD AUTO: 0.05 K/UL — SIGNIFICANT CHANGE UP (ref 0–0.5)
EOSINOPHIL NFR BLD AUTO: 0.5 % — SIGNIFICANT CHANGE UP (ref 0–6)
GLUCOSE SERPL-MCNC: 110 MG/DL — HIGH (ref 70–99)
HCT VFR BLD CALC: 42.1 % — SIGNIFICANT CHANGE UP (ref 34.5–45)
HGB BLD-MCNC: 13.5 G/DL — SIGNIFICANT CHANGE UP (ref 11.5–15.5)
IMM GRANULOCYTES NFR BLD AUTO: 0.6 % — SIGNIFICANT CHANGE UP (ref 0–1.5)
LYMPHOCYTES # BLD AUTO: 1.68 K/UL — SIGNIFICANT CHANGE UP (ref 1–3.3)
LYMPHOCYTES # BLD AUTO: 15.4 % — SIGNIFICANT CHANGE UP (ref 13–44)
MCHC RBC-ENTMCNC: 28.6 PG — SIGNIFICANT CHANGE UP (ref 27–34)
MCHC RBC-ENTMCNC: 32.1 GM/DL — SIGNIFICANT CHANGE UP (ref 32–36)
MCV RBC AUTO: 89.2 FL — SIGNIFICANT CHANGE UP (ref 80–100)
MONOCYTES # BLD AUTO: 1.34 K/UL — HIGH (ref 0–0.9)
MONOCYTES NFR BLD AUTO: 12.3 % — SIGNIFICANT CHANGE UP (ref 2–14)
NEUTROPHILS # BLD AUTO: 7.66 K/UL — HIGH (ref 1.8–7.4)
NEUTROPHILS NFR BLD AUTO: 70.5 % — SIGNIFICANT CHANGE UP (ref 43–77)
PLATELET # BLD AUTO: 322 K/UL — SIGNIFICANT CHANGE UP (ref 150–400)
POTASSIUM SERPL-MCNC: 4.3 MMOL/L — SIGNIFICANT CHANGE UP (ref 3.5–5.3)
POTASSIUM SERPL-SCNC: 4.3 MMOL/L — SIGNIFICANT CHANGE UP (ref 3.5–5.3)
RBC # BLD: 4.72 M/UL — SIGNIFICANT CHANGE UP (ref 3.8–5.2)
RBC # FLD: 15.9 % — HIGH (ref 10.3–14.5)
SODIUM SERPL-SCNC: 136 MMOL/L — SIGNIFICANT CHANGE UP (ref 135–145)
WBC # BLD: 10.88 K/UL — HIGH (ref 3.8–10.5)
WBC # FLD AUTO: 10.88 K/UL — HIGH (ref 3.8–10.5)

## 2020-08-28 PROCEDURE — 99232 SBSQ HOSP IP/OBS MODERATE 35: CPT

## 2020-08-28 RX ORDER — ACETAMINOPHEN 500 MG
650 TABLET ORAL EVERY 6 HOURS
Refills: 0 | Status: DISCONTINUED | OUTPATIENT
Start: 2020-08-28 | End: 2020-08-31

## 2020-08-28 RX ADMIN — Medication 30 MILLIGRAM(S): at 23:26

## 2020-08-28 RX ADMIN — Medication 30 MILLIGRAM(S): at 17:48

## 2020-08-28 RX ADMIN — Medication 30 MILLIGRAM(S): at 06:19

## 2020-08-28 RX ADMIN — Medication 100 MILLIGRAM(S): at 23:26

## 2020-08-28 RX ADMIN — QUETIAPINE FUMARATE 12.5 MILLIGRAM(S): 200 TABLET, FILM COATED ORAL at 23:27

## 2020-08-28 RX ADMIN — SPIRONOLACTONE 12.5 MILLIGRAM(S): 25 TABLET, FILM COATED ORAL at 09:52

## 2020-08-28 RX ADMIN — Medication 1 TABLET(S): at 09:51

## 2020-08-28 RX ADMIN — APIXABAN 5 MILLIGRAM(S): 2.5 TABLET, FILM COATED ORAL at 09:50

## 2020-08-28 RX ADMIN — APIXABAN 5 MILLIGRAM(S): 2.5 TABLET, FILM COATED ORAL at 23:26

## 2020-08-28 RX ADMIN — Medication 100 MILLIGRAM(S): at 00:17

## 2020-08-28 RX ADMIN — Medication 81 MILLIGRAM(S): at 09:50

## 2020-08-28 RX ADMIN — QUETIAPINE FUMARATE 12.5 MILLIGRAM(S): 200 TABLET, FILM COATED ORAL at 00:20

## 2020-08-28 NOTE — PHYSICAL THERAPY INITIAL EVALUATION ADULT - CRITERIA FOR SKILLED THERAPEUTIC INTERVENTIONS
risk reduction/prevention/impairments found/predicted duration of therapy intervention/anticipated equipment needs at discharge/rehab potential/therapy frequency/functional limitations in following categories/anticipated discharge recommendation

## 2020-08-28 NOTE — PROGRESS NOTE ADULT - SUBJECTIVE AND OBJECTIVE BOX
86yo/F with PMH chronic afib (discharged from [place] on 8/6/20 off AC due to high risk of falls but Eliquis resumed at full dose of 10mg BID at [facility] [place] on 8/14/20), s/p TAVR, CAD s/p stent, chr diastolic CHF EF 60-65%, SCC, dementia presented with worsening SOB form [facility] [place]. Patient is unable to provide any history as very confused and agitated at present. Patient found to be in acute CHF in ED and given Lasix 40mg IV. Telemetry- afib at 110-120's.   8/28-  more awake and alert today , afebrile   Review of Systems:  Other Review of Systems: All other review of systems negative, except as noted in HPI  Physical Exam:  · Constitutional	NAD, alert , awke, follows commands   · Neck	No bruits; no thyromegaly or nodules  · Respiratory	detailed exam  · Respiratory Comments	unable to fully cooperate  · Cardiovascular	detailed exam  · Cardiovascular Details	irregular  rhythm  · Gastrointestinal	Soft, non-tender, no hepatosplenomegaly, normal bowel sounds  ·   · Extremities	No cyanosis, clubbing or edema  · Neurological	detailed exam          PHYSICAL EXAM:    Daily     Daily     ICU Vital Signs Last 24 Hrs  T(C): 37.3 (28 Aug 2020 07:59), Max: 37.8 (28 Aug 2020 00:17)  T(F): 99.1 (28 Aug 2020 07:59), Max: 100 (28 Aug 2020 00:17)  HR: 92 (28 Aug 2020 13:49) (80 - 99)  BP: 98/72 (28 Aug 2020 13:49) (95/60 - 117/70)  BP(mean): --  ABP: --  ABP(mean): --  RR: 16 (28 Aug 2020 07:59) (16 - 18)  SpO2: 92% (28 Aug 2020 07:59) (92% - 96%)                                13.5   10.88 )-----------( 322      ( 28 Aug 2020 07:17 )             42.1       CBC Full  -  ( 28 Aug 2020 07:17 )  WBC Count : 10.88 K/uL  RBC Count : 4.72 M/uL  Hemoglobin : 13.5 g/dL  Hematocrit : 42.1 %  Platelet Count - Automated : 322 K/uL  Mean Cell Volume : 89.2 fl  Mean Cell Hemoglobin : 28.6 pg  Mean Cell Hemoglobin Concentration : 32.1 gm/dL  Auto Neutrophil # : 7.66 K/uL  Auto Lymphocyte # : 1.68 K/uL  Auto Monocyte # : 1.34 K/uL  Auto Eosinophil # : 0.05 K/uL  Auto Basophil # : 0.08 K/uL  Auto Neutrophil % : 70.5 %  Auto Lymphocyte % : 15.4 %  Auto Monocyte % : 12.3 %  Auto Eosinophil % : 0.5 %  Auto Basophil % : 0.7 %      08-28    136  |  104  |  24<H>  ----------------------------<  110<H>  4.3   |  27  |  0.72    Ca    9.4      28 Aug 2020 07:17                              MEDICATIONS  (STANDING):  apixaban 5 milliGRAM(s) Oral every 12 hours  aspirin enteric coated 81 milliGRAM(s) Oral daily  diltiazem    Tablet 30 milliGRAM(s) Oral every 6 hours  metoprolol tartrate 100 milliGRAM(s) Oral two times a day  multivitamin 1 Tablet(s) Oral daily  QUEtiapine 12.5 milliGRAM(s) Oral at bedtime  spironolactone 12.5 milliGRAM(s) Oral daily

## 2020-08-28 NOTE — PHYSICAL THERAPY INITIAL EVALUATION ADULT - DIAGNOSIS, PT EVAL
Acute on chronic diastolic CHF exacerbation, AMS,+ COVID infection/positive for abs, no PNA, PE, weakness, Dementia

## 2020-08-28 NOTE — PROGRESS NOTE ADULT - ASSESSMENT
8/28  Acute on chronic diastolic CHF exacerbation improved - clinically now compensated   Prior COVID infection/positive for abs, no PNA   Afib RVR , now rate controlled   PE eliquis started in [facility]   Hx Afib , TAVR, CADstent  SCC   Acute metabolic encephalopathy improved , mentation now at baseline ,   ecoli urinary colonisation/ UA wnl /no urinary symptoms   PT eval, recommended rehab , covid reswab on sunday per SW , for rehab on mon  stop lasix due to hypotension   continue meds for rate control  monitor off abx per ID   PT eval for discharge planning         #Acute on chronic diastolic CHF  # Prior covid exposure/abb positive    telemetry  S/p IV Lasix, will continue with 40mg BID IV  Cardio eval appreciatd   ECHO from previous admission [place]- EF 60-65%  Monitor lytes while on diuresis  COVID Ab (+) 8/1  COVID PCR (+) 8/24  CXR: Impression: small BILATERAL pleural effusions with underlying atelectasis#PE  Patient previously discharged off ELiquis due to high risk of falls  Eliquis resumed at [facility] on 8/14 at 10mg BIDWill continue 5mg BID as after 7 days of initial loading dose  CT angio: Slight defect along the periphery of the posterior right upper lobe segmental pulmonary artery may represent a focal acute versus chronic pulmonary embolus. Linear defect in the origin of the left upper lobe pulmonary artery may represent chronic thrombus or pulmonary web.    #Afib with RVR  S/p Metoprolol PO  Will continue BB and cardizem  Adjust dose further to control the rate  AC by Eliquis    #agitation likely 2 to sundowning  Haldol and Ativan prn  Increase Seroquel to 25mg QHS    #CAD s/p stent  #S/p TAVR  Continue Aspirin    #COVID pending, low suspicion    #Increased Nutrient Needs  1) add ensure enlive once daily and gelatein BID to optimize PO intake 2) daily wt checks to track/trend changes 3) consider checking vitamin D level 4) encourage oral supplements between meals to optimize PO intake#DVT proph- on Eliquis

## 2020-08-28 NOTE — PHYSICAL THERAPY INITIAL EVALUATION ADULT - PERTINENT HX OF CURRENT PROBLEM, REHAB EVAL
Pt with dementia presented with worsening SOB form [facility]. recently discharged from [place] on 8/6/20, Acute on chronic diastolic CHF exacerbation ,

## 2020-08-28 NOTE — PROVIDER CONTACT NOTE (CHANGE IN STATUS NOTIFICATION) - SITUATION
85 yr Female admitted for heart failure, positive covid and antibodies.  Pt has heart rate of 128 bpm and oral temperature of 101.8.

## 2020-08-29 LAB
ANION GAP SERPL CALC-SCNC: 6 MMOL/L — SIGNIFICANT CHANGE UP (ref 5–17)
BASOPHILS # BLD AUTO: 0.13 K/UL — SIGNIFICANT CHANGE UP (ref 0–0.2)
BASOPHILS NFR BLD AUTO: 1.3 % — SIGNIFICANT CHANGE UP (ref 0–2)
BUN SERPL-MCNC: 32 MG/DL — HIGH (ref 7–23)
CALCIUM SERPL-MCNC: 9.5 MG/DL — SIGNIFICANT CHANGE UP (ref 8.5–10.1)
CHLORIDE SERPL-SCNC: 104 MMOL/L — SIGNIFICANT CHANGE UP (ref 96–108)
CO2 SERPL-SCNC: 29 MMOL/L — SIGNIFICANT CHANGE UP (ref 22–31)
CREAT SERPL-MCNC: 0.8 MG/DL — SIGNIFICANT CHANGE UP (ref 0.5–1.3)
EOSINOPHIL # BLD AUTO: 0.17 K/UL — SIGNIFICANT CHANGE UP (ref 0–0.5)
EOSINOPHIL NFR BLD AUTO: 1.8 % — SIGNIFICANT CHANGE UP (ref 0–6)
GLUCOSE SERPL-MCNC: 82 MG/DL — SIGNIFICANT CHANGE UP (ref 70–99)
HCT VFR BLD CALC: 41.7 % — SIGNIFICANT CHANGE UP (ref 34.5–45)
HGB BLD-MCNC: 13.3 G/DL — SIGNIFICANT CHANGE UP (ref 11.5–15.5)
IMM GRANULOCYTES NFR BLD AUTO: 0.5 % — SIGNIFICANT CHANGE UP (ref 0–1.5)
LYMPHOCYTES # BLD AUTO: 2.7 K/UL — SIGNIFICANT CHANGE UP (ref 1–3.3)
LYMPHOCYTES # BLD AUTO: 27.8 % — SIGNIFICANT CHANGE UP (ref 13–44)
MCHC RBC-ENTMCNC: 28.7 PG — SIGNIFICANT CHANGE UP (ref 27–34)
MCHC RBC-ENTMCNC: 31.9 GM/DL — LOW (ref 32–36)
MCV RBC AUTO: 89.9 FL — SIGNIFICANT CHANGE UP (ref 80–100)
MONOCYTES # BLD AUTO: 1.41 K/UL — HIGH (ref 0–0.9)
MONOCYTES NFR BLD AUTO: 14.5 % — HIGH (ref 2–14)
NEUTROPHILS # BLD AUTO: 5.25 K/UL — SIGNIFICANT CHANGE UP (ref 1.8–7.4)
NEUTROPHILS NFR BLD AUTO: 54.1 % — SIGNIFICANT CHANGE UP (ref 43–77)
PLATELET # BLD AUTO: 312 K/UL — SIGNIFICANT CHANGE UP (ref 150–400)
POTASSIUM SERPL-MCNC: 4.2 MMOL/L — SIGNIFICANT CHANGE UP (ref 3.5–5.3)
POTASSIUM SERPL-SCNC: 4.2 MMOL/L — SIGNIFICANT CHANGE UP (ref 3.5–5.3)
RBC # BLD: 4.64 M/UL — SIGNIFICANT CHANGE UP (ref 3.8–5.2)
RBC # FLD: 16.3 % — HIGH (ref 10.3–14.5)
SODIUM SERPL-SCNC: 139 MMOL/L — SIGNIFICANT CHANGE UP (ref 135–145)
WBC # BLD: 9.71 K/UL — SIGNIFICANT CHANGE UP (ref 3.8–10.5)
WBC # FLD AUTO: 9.71 K/UL — SIGNIFICANT CHANGE UP (ref 3.8–10.5)

## 2020-08-29 PROCEDURE — 71045 X-RAY EXAM CHEST 1 VIEW: CPT | Mod: 26

## 2020-08-29 PROCEDURE — 99232 SBSQ HOSP IP/OBS MODERATE 35: CPT

## 2020-08-29 RX ORDER — ALBUTEROL 90 UG/1
2 AEROSOL, METERED ORAL EVERY 4 HOURS
Refills: 0 | Status: DISCONTINUED | OUTPATIENT
Start: 2020-08-29 | End: 2020-08-29

## 2020-08-29 RX ORDER — ALBUTEROL 90 UG/1
2 AEROSOL, METERED ORAL EVERY 6 HOURS
Refills: 0 | Status: DISCONTINUED | OUTPATIENT
Start: 2020-08-29 | End: 2020-08-29

## 2020-08-29 RX ADMIN — Medication 30 MILLIGRAM(S): at 05:46

## 2020-08-29 RX ADMIN — APIXABAN 5 MILLIGRAM(S): 2.5 TABLET, FILM COATED ORAL at 09:30

## 2020-08-29 RX ADMIN — Medication 100 MILLIGRAM(S): at 23:42

## 2020-08-29 RX ADMIN — SPIRONOLACTONE 12.5 MILLIGRAM(S): 25 TABLET, FILM COATED ORAL at 09:31

## 2020-08-29 RX ADMIN — Medication 30 MILLIGRAM(S): at 18:21

## 2020-08-29 RX ADMIN — Medication 100 MILLIGRAM(S): at 09:31

## 2020-08-29 RX ADMIN — Medication 30 MILLIGRAM(S): at 12:09

## 2020-08-29 RX ADMIN — QUETIAPINE FUMARATE 12.5 MILLIGRAM(S): 200 TABLET, FILM COATED ORAL at 23:42

## 2020-08-29 RX ADMIN — Medication 1 TABLET(S): at 09:31

## 2020-08-29 RX ADMIN — Medication 81 MILLIGRAM(S): at 09:30

## 2020-08-29 RX ADMIN — APIXABAN 5 MILLIGRAM(S): 2.5 TABLET, FILM COATED ORAL at 23:42

## 2020-08-29 RX ADMIN — Medication 30 MILLIGRAM(S): at 23:42

## 2020-08-29 NOTE — PROGRESS NOTE ADULT - SUBJECTIVE AND OBJECTIVE BOX
86yo/F with PMH chronic afib (discharged from [place] on 20 off AC due to high risk of falls but Eliquis resumed at full dose of 10mg BID at [facility] on 20), s/p TAVR, CAD s/p stent, chr diastolic CHF EF 60-65%, SCC, dementia presented with worsening SOB form [facility]. Patient is unable to provide any history as very confused and agitated at present. Patient found to be in acute CHF in ED and given Lasix 40mg IV. Telemetry- afib at 110-120's.   -  more awake and alert today , afebrile, denies dysuria, no abd pain , no SOB or cough   Review of Systems:  Other Review of Systems: All other review of systems negative, except as noted in HPI  Physical Exam:  · Constitutional	NAD, alert , awke, follows commands   · Neck	No bruits; no thyromegaly or nodules  · Respiratory	detailed exam  · Respiratory Comments	unable to fully cooperate  · Cardiovascular	detailed exam  · Cardiovascular Details	irregular  rhythm  · Gastrointestinal	Soft, non-tender, no hepatosplenomegaly, normal bowel sounds  ·   · Extremities	No cyanosis, clubbing or edema  · Neurological	detailed exam        PHYSICAL EXAM:    Daily     Daily Weight in k (29 Aug 2020 05:49)    ICU Vital Signs Last 24 Hrs  T(C): 36.9 (29 Aug 2020 07:57), Max: 38.8 (28 Aug 2020 23:39)  T(F): 98.4 (29 Aug 2020 07:57), Max: 101.8 (28 Aug 2020 23:39)  HR: 69 (29 Aug 2020 07:57) (69 - 128)  BP: 118/75 (29 Aug 2020 07:57) (107/69 - 123/69)  BP(mean): 68 (29 Aug 2020 05:49) (68 - 77)  ABP: --  ABP(mean): --  RR: 16 (29 Aug 2020 07:57) (16 - 17)  SpO2: 97% (29 Aug 2020 07:57) (94% - 97%)                                13.3   9.71  )-----------( 312      ( 29 Aug 2020 07:46 )             41.7       CBC Full  -  ( 29 Aug 2020 07:46 )  WBC Count : 9.71 K/uL  RBC Count : 4.64 M/uL  Hemoglobin : 13.3 g/dL  Hematocrit : 41.7 %  Platelet Count - Automated : 312 K/uL  Mean Cell Volume : 89.9 fl  Mean Cell Hemoglobin : 28.7 pg  Mean Cell Hemoglobin Concentration : 31.9 gm/dL  Auto Neutrophil # : 5.25 K/uL  Auto Lymphocyte # : 2.70 K/uL  Auto Monocyte # : 1.41 K/uL  Auto Eosinophil # : 0.17 K/uL  Auto Basophil # : 0.13 K/uL  Auto Neutrophil % : 54.1 %  Auto Lymphocyte % : 27.8 %  Auto Monocyte % : 14.5 %  Auto Eosinophil % : 1.8 %  Auto Basophil % : 1.3 %          139  |  104  |  32<H>  ----------------------------<  82  4.2   |  29  |  0.80    Ca    9.5      29 Aug 2020 07:46                              MEDICATIONS  (STANDING):  apixaban 5 milliGRAM(s) Oral every 12 hours  aspirin enteric coated 81 milliGRAM(s) Oral daily  diltiazem    Tablet 30 milliGRAM(s) Oral every 6 hours  metoprolol tartrate 100 milliGRAM(s) Oral two times a day  multivitamin 1 Tablet(s) Oral daily  QUEtiapine 12.5 milliGRAM(s) Oral at bedtime  spironolactone 12.5 milliGRAM(s) Oral daily 86yo/F with PMH chronic afib (discharged from [place] on 20 off AC due to high risk of falls but Eliquis resumed at full dose of 10mg BID at [facility] on 20), s/p TAVR, CAD s/p stent, chr diastolic CHF EF 60-65%, SCC, dementia presented with worsening SOB form [facility]. Patient is unable to provide any history as very confused and agitated at present. Patient found to be in acute CHF in ED and given Lasix 40mg IV. Telemetry- afib at 110-120's.   -  more awake and alert today , fever 101  overnight  denies dysuria, no abd pain , no SOB or cough   Review of Systems:  Other Review of Systems: All other review of systems negative, except as noted in HPI  Physical Exam:  · Constitutional	NAD, alert , awke, follows commands   · Neck	No bruits; no thyromegaly or nodules  · Respiratory	detailed exam  · Respiratory Comments	unable to fully cooperate  · Cardiovascular	detailed exam  · Cardiovascular Details	irregular  rhythm  · Gastrointestinal	Soft, non-tender, no hepatosplenomegaly, normal bowel sounds  ·   · Extremities	No cyanosis, clubbing or edema  · Neurological	detailed exam        PHYSICAL EXAM:    Daily     Daily Weight in k (29 Aug 2020 05:49)    ICU Vital Signs Last 24 Hrs  T(C): 36.9 (29 Aug 2020 07:57), Max: 38.8 (28 Aug 2020 23:39)  T(F): 98.4 (29 Aug 2020 07:57), Max: 101.8 (28 Aug 2020 23:39)  HR: 69 (29 Aug 2020 07:57) (69 - 128)  BP: 118/75 (29 Aug 2020 07:57) (107/69 - 123/69)  BP(mean): 68 (29 Aug 2020 05:49) (68 - 77)  ABP: --  ABP(mean): --  RR: 16 (29 Aug 2020 07:57) (16 - 17)  SpO2: 97% (29 Aug 2020 07:57) (94% - 97%)                                13.3   9.71  )-----------( 312      ( 29 Aug 2020 07:46 )             41.7       CBC Full  -  ( 29 Aug 2020 07:46 )  WBC Count : 9.71 K/uL  RBC Count : 4.64 M/uL  Hemoglobin : 13.3 g/dL  Hematocrit : 41.7 %  Platelet Count - Automated : 312 K/uL  Mean Cell Volume : 89.9 fl  Mean Cell Hemoglobin : 28.7 pg  Mean Cell Hemoglobin Concentration : 31.9 gm/dL  Auto Neutrophil # : 5.25 K/uL  Auto Lymphocyte # : 2.70 K/uL  Auto Monocyte # : 1.41 K/uL  Auto Eosinophil # : 0.17 K/uL  Auto Basophil # : 0.13 K/uL  Auto Neutrophil % : 54.1 %  Auto Lymphocyte % : 27.8 %  Auto Monocyte % : 14.5 %  Auto Eosinophil % : 1.8 %  Auto Basophil % : 1.3 %          139  |  104  |  32<H>  ----------------------------<  82  4.2   |  29  |  0.80    Ca    9.5      29 Aug 2020 07:46                              MEDICATIONS  (STANDING):  apixaban 5 milliGRAM(s) Oral every 12 hours  aspirin enteric coated 81 milliGRAM(s) Oral daily  diltiazem    Tablet 30 milliGRAM(s) Oral every 6 hours  metoprolol tartrate 100 milliGRAM(s) Oral two times a day  multivitamin 1 Tablet(s) Oral daily  QUEtiapine 12.5 milliGRAM(s) Oral at bedtime  spironolactone 12.5 milliGRAM(s) Oral daily

## 2020-08-29 NOTE — ASU PATIENT PROFILE, ADULT - NS PRO ABUSE SCREEN SUSPICION NEGLECT YN
Physical Therapy  Visit Type: initial evaluation  Precautions:  Medical precautions:   8/20/2020: 52 yo male admitted from home with SOB: w/u COPD/CHF  => 4CWE  8/24/2020: s/p paracentesis =>3CE  8/25/2020: =>14W  8/27/2020: PT/OT orders received: activity as tolerated  Lines:     Basic: O2  Safety Measures: bed alarm      SUBJECTIVE                                                                                                            Patient agreed to participate in therapy this date.  Consents to session. \"This isn't a quality of life. I get short of breath with minimal activity\"  Patient / Family Goal: return to previous functional status, maximize function and return home  Pain   RN informed on pain level      OBJECTIVE                                                                                                                Oriented to person, place, time and situation     Arousal alertness: appropriate responses to stimuli    Affect/Behavior: calm and cooperative  Functional Communication/Cognition    Overall status:  Within functional limits  Range of Motion (measured in degrees unless otherwise noted, active unless indicated)  WFL: RLE, LLE  Strength (out of 5 unless otherwise indicated)   Comments / Details:  No formal MMT, but WFLs per mobilities    Balance    Sitting: Static: modified independent    Standing - Firm Surface - Eyes Open: Static: contact guard/touching/steadying assist  Balance Details: Static standing: CGA, tends to reach out for UE support (e.g. on O2 tank, on tray table)  Neurological Comments / Details: Not assessed this session  Bed Mobility:      Supine to sit: modified independent    Sit to supine: modified independent  Training completed:    Tasks: supine to sit and sit to supine    Education details: patient demonstrates understanding  Transfers:    Transfers assistive devices: none.    Sit to stand: contact guard/touching/steadying assist and supervision (no AD, occ reaches  out for UE support for balance)    Stand to sit: contact guard/touching/steadying assist and supervision (no AD, occ reaches out for balance support)    Stand pivot: contact guard/touching/steadying assist and supervision (no AD, occ reaches out for balance support)  Training completed:    Tasks: sit to stand, stand to sit and stand pivot    Education details: patient safety and patient requires additional training  Gait/Ambulation:     Assistance: contact guard/touching/steadying assist   Assistive device: none    Distance (ft): 12    Type: unsteady (mildly unsteady, light UE support on O2 tank, distance limited by SOB and fatigue)  Training Completed:    Tasks: gait training on level surfaces    Education details: patient safety and patient requires additional training  Stair Mobility:      Assistance: not attempted due to safety concerns        Interventions                                                                                                       Training provided: bed mobility training, transfer training, safety training and gait training  Assisted using commode 2 times during session. Covered in BM upon arrival, needing assist for hygiene care d/t gets SOB during hygiene efforts. Needed gown changed 2 times during session 2^ bowel incontinence. Needing rest breaks bt each task, rested in recliner, rested eob, rested on commode. In bed at end.  Skilled input: Verbal instruction/cues and tactile instruction/cues  Verbal Consent: Writer verbally educated and received verbal consent for hand placement, positioning of patient, and techniques to be performed today from patient for therapist position for techniques as described above and how they are pertinent to the patient's plan of care.        ASSESSMENT                                                                                                                Impairments: activity tolerance, shortness of breath, endurance and balance  deficits  Functional Limitations: all functional mobility  Tolerated PT evaluation fairly. Very SOB w/ minimal activity, but motivated to do what he can with therapy. On 2L O2 during session. Needing several minute rest breaks bt each activity. Had bowel accident before arrival, then needing to use commode again during session. Needs assist w hygiene care d/t gets too SOB to complete on his own. Mod I for bed mobility, CGA-SBA for OOB activity. Activity very limited by SOB. Will cont to follow.       Discharge Recommendations   Recommendation for Discharge: PT IL: Patient needs nondaily skilled therapy after discharge, Patient requires intermittent nonskilled assistance to perform mobility and/or ADLs safely                   Therapy Diagnosis:  Other Abnormalities of Gait and Mobility    Skilled therapy is required to address these limitations in attempt to maximize the patient's independence.  Progress: progressing toward goals  Predicted patient presentation: Low (stable) - Patient comorbidities and complexities, as defined above, will have little effect on progress for prescribed plan of care.      End of Session:   Location: in bed  Safety measures: alarm system in place/re-engaged, bed rails x3, call light within reach, equipment intact and lines intact  Handoff to: nurse            PLAN                                                                                                                            Suggestions for next session as indicated: Plan: cont'd gait/balance/activity tolerance training    PT Frequency: 2 days/week, 3 days/week(2-3x/wk)  Frequency Comments: 1/3   home? (pt is homeless)   maicoal 8.29    A minimum of 8 minutes per session x 1 week.    Interventions: balance, gait training, bed mobility, HEP train/position, patient/family training, functional transfer training, stairs retraining and strengthening  Agreement to plan and goals: patient agrees with goals and treatment  plan        Goals Reviewed: 9/5/2020  GOALS:  Long Term Goals: (to be met by time of discharge from hospital)  Sit to stand: Patient will complete sit to stand transfer with modified independent.   Stand pivot: Patient will complete stand pivot transfer with modified independent.   Ambulation (even): Patient will ambulate on even surface for 30 feet with supervision.       Documented in the chart in the following areas: Prior Level of Function. Pain. Assessment.           no

## 2020-08-29 NOTE — PROGRESS NOTE ADULT - ASSESSMENT
8/29  Acute on chronic diastolic CHF exacerbation improved - clinically now compensated   Prior COVID infection/positive for abs, no PNA   Afib RVR , now rate controlled   PE eliquis started in [facility]   Hx Afib , TAVR, CADstent  SCC   Acute metabolic encephalopathy improved , mentation now at baseline ,   ecoli urinary colonisation/ UA wnl /no urinary symptoms   PT eval, recommended rehab , covid reswab on sunday per SW , for rehab on mon  stop lasix due to hypotension   continue meds for rate control  monitor off abx per ID   PT eval for discharge planning   i discussed with daughter        #Acute on chronic diastolic CHF  # Prior covid exposure/abb positive    telemetry  S/p IV Lasix, will continue with 40mg BID IV  Cardio eval appreciatd   ECHO from previous admission [place]- EF 60-65%  Monitor lytes while on diuresis  COVID Ab (+) 8/1  COVID PCR (+) 8/24  CXR: Impression: small BILATERAL pleural effusions with underlying atelectasis#PE  Patient previously discharged off ELiquis due to high risk of falls  Eliquis resumed at [facility] on 8/14 at 10mg BIDWill continue 5mg BID as after 7 days of initial loading dose  CT angio: Slight defect along the periphery of the posterior right upper lobe segmental pulmonary artery may represent a focal acute versus chronic pulmonary embolus. Linear defect in the origin of the left upper lobe pulmonary artery may represent chronic thrombus or pulmonary web.    #Afib with RVR  S/p Metoprolol PO  Will continue BB and cardizem  Adjust dose further to control the rate  AC by Eliquis    #agitation likely 2 to sundowning  Haldol and Ativan prn  Increase Seroquel to 25mg QHS    #CAD s/p stent  #S/p TAVR  Continue Aspirin    #COVID pending, low suspicion    #Increased Nutrient Needs  1) add ensure enlive once daily and gelatein BID to optimize PO intake 2) daily wt checks to track/trend changes 3) consider checking vitamin D level 4) encourage oral supplements between meals to optimize PO intake#DVT proph- on Eliquis 8/29 New fever 101  Acute on chronic diastolic CHF exacerbation improved - clinically now compensated   Prior COVID infection/positive for abs, no PNA   Afib RVR , now rate controlled   PE eliquis started in [facility]   Hx Afib , TAVR, CADstent  SCC   Acute metabolic encephalopathy improved , mentation now at baseline ,   ecoli urinary colonisation/ UA wnl /no urinary symptoms     will check UA , CXR  PT eval, recommended rehab , covid reswab on sunday per SW , for rehab on mon  stop lasix due to hypotension   continue meds for rate control  monitor off abx per ID   PT eval for discharge planning   i discussed with daughter        #Acute on chronic diastolic CHF  # Prior covid exposure/abb positive    telemetry  S/p IV Lasix, will continue with 40mg BID IV  Cardio eval appreciatd   ECHO from previous admission [place]- EF 60-65%  Monitor lytes while on diuresis  COVID Ab (+) 8/1  COVID PCR (+) 8/24  CXR: Impression: small BILATERAL pleural effusions with underlying atelectasis#PE  Patient previously discharged off ELiquis due to high risk of falls  Eliquis resumed at [facility] on 8/14 at 10mg BIDWill continue 5mg BID as after 7 days of initial loading dose  CT angio: Slight defect along the periphery of the posterior right upper lobe segmental pulmonary artery may represent a focal acute versus chronic pulmonary embolus. Linear defect in the origin of the left upper lobe pulmonary artery may represent chronic thrombus or pulmonary web.    #Afib with RVR  S/p Metoprolol PO  Will continue BB and cardizem  Adjust dose further to control the rate  AC by Eliquis    #agitation likely 2 to sundowning  Haldol and Ativan prn  Increase Seroquel to 25mg QHS    #CAD s/p stent  #S/p TAVR  Continue Aspirin    #COVID pending, low suspicion    #Increased Nutrient Needs  1) add ensure enlive once daily and gelatein BID to optimize PO intake 2) daily wt checks to track/trend changes 3) consider checking vitamin D level 4) encourage oral supplements between meals to optimize PO intake#DVT proph- on Eliquis 8/29 New fever 101  Acute on chronic diastolic CHF exacerbation improved - clinically now compensated   Prior COVID infection/positive for abs, no PNA   Afib RVR , now rate controlled   PE eliquis started in [facility]   Hx Afib , TAVR, CADstent  SCC   Acute metabolic encephalopathy improved , mentation now at baseline ,   ecoli urinary colonisation/ UA wnl /no urinary symptoms     will check UA , CXR  PT eval, recommended rehab , covid reswab on sunday per SW , for rehab on mon  stop lasix due to hypotension   continue meds for rate control  monitor off abx per ID   PT eval for discharge planning   i discussed with daughter        #Acute on chronic diastolic CHF  # Prior covid exposure/abb positive    telemetry  S/p IV Lasix, will continue with 40mg BID IV  Cardio eval appreciatd   ECHO from previous admission [place]- EF 60-65%  Monitor lytes while on diuresis  COVID Ab (+) 8/1  COVID PCR (+) 8/24  CXR: Impression: small BILATERAL pleural effusions with underlying atelectasis#PE  Patient previously discharged off ELiquis due to high risk of falls  Eliquis resumed at [facility] on 8/14 at 10mg BIDWill continue 5mg BID as after 7 days of initial loading dose  CT angio: Slight defect along the periphery of the posterior right upper lobe segmental pulmonary artery may represent a focal acute versus chronic pulmonary embolus. Linear defect in the origin of the left upper lobe pulmonary artery may represent chronic thrombus or pulmonary web.    #Afib with RVR  S/p Metoprolol PO  Will continue BB and cardizem  Adjust dose further to control the rate  AC by Eliquis    #agitation likely 2 to sundowning  Haldol and Ativan prn  Increase Seroquel to 25mg QHS    #CAD s/p stent  #S/p TAVR  Continue Aspirin    #COVID pending, low suspicion    #Increased Nutrient Needs  1) add ensure enlive once daily and gelatein BID to optimize PO intake 2) daily wt checks to track/trend changes 3) consider checking vitamin D level 4) encourage oral supplements between meals to optimize PO intake#DVT proph- on Eliquis 8/29 New fever 101  Acute on chronic diastolic CHF exacerbation improved - clinically now compensated   Prior COVID infection/positive for abs, no PNA   Afib RVR , now rate controlled   PE eliquis started in [facility]   Hx Afib , TAVR, CADstent  SCC   Acute metabolic encephalopathy improved , mentation now at baseline ,   ecoli urinary colonisation/ UA wnl /no urinary symptoms     will check UA , CXR  PT eval, recommended rehab , covid reswab on sunday per SW , for rehab on mon  stop lasix due to hypotension   continue meds for rate control  monitor off abx per ID   PT eval for discharge planning   i discussed with daughter elizabeth        #Acute on chronic diastolic CHF  # Prior covid exposure/abb positive    telemetry  S/p IV Lasix, will continue with 40mg BID IV  Cardio eval appreciatd   ECHO from previous admission [place]- EF 60-65%  Monitor lytes while on diuresis  COVID Ab (+) 8/1  COVID PCR (+) 8/24  CXR: Impression: small BILATERAL pleural effusions with underlying atelectasis#PE  Patient previously discharged off ELiquis due to high risk of falls  Eliquis resumed at [facility] on 8/14 at 10mg BIDWill continue 5mg BID as after 7 days of initial loading dose  CT angio: Slight defect along the periphery of the posterior right upper lobe segmental pulmonary artery may represent a focal acute versus chronic pulmonary embolus. Linear defect in the origin of the left upper lobe pulmonary artery may represent chronic thrombus or pulmonary web.    #Afib with RVR  S/p Metoprolol PO  Will continue BB and cardizem  Adjust dose further to control the rate  AC by Eliquis    #agitation likely 2 to sundowning  Haldol and Ativan prn  Increase Seroquel to 25mg QHS    #CAD s/p stent  #S/p TAVR  Continue Aspirin    #COVID pending, low suspicion    #Increased Nutrient Needs  1) add ensure enlive once daily and gelatein BID to optimize PO intake 2) daily wt checks to track/trend changes 3) consider checking vitamin D level 4) encourage oral supplements between meals to optimize PO intake#DVT proph- on Eliquis

## 2020-08-30 LAB
ANION GAP SERPL CALC-SCNC: 7 MMOL/L — SIGNIFICANT CHANGE UP (ref 5–17)
APPEARANCE UR: CLEAR — SIGNIFICANT CHANGE UP
BASOPHILS # BLD AUTO: 0.1 K/UL — SIGNIFICANT CHANGE UP (ref 0–0.2)
BASOPHILS NFR BLD AUTO: 0.9 % — SIGNIFICANT CHANGE UP (ref 0–2)
BILIRUB UR-MCNC: NEGATIVE — SIGNIFICANT CHANGE UP
BUN SERPL-MCNC: 27 MG/DL — HIGH (ref 7–23)
CALCIUM SERPL-MCNC: 9.1 MG/DL — SIGNIFICANT CHANGE UP (ref 8.5–10.1)
CHLORIDE SERPL-SCNC: 108 MMOL/L — SIGNIFICANT CHANGE UP (ref 96–108)
CO2 SERPL-SCNC: 22 MMOL/L — SIGNIFICANT CHANGE UP (ref 22–31)
COLOR SPEC: YELLOW — SIGNIFICANT CHANGE UP
CREAT SERPL-MCNC: 0.63 MG/DL — SIGNIFICANT CHANGE UP (ref 0.5–1.3)
DIFF PNL FLD: NEGATIVE — SIGNIFICANT CHANGE UP
EOSINOPHIL # BLD AUTO: 0.15 K/UL — SIGNIFICANT CHANGE UP (ref 0–0.5)
EOSINOPHIL NFR BLD AUTO: 1.4 % — SIGNIFICANT CHANGE UP (ref 0–6)
GLUCOSE SERPL-MCNC: 94 MG/DL — SIGNIFICANT CHANGE UP (ref 70–99)
GLUCOSE UR QL: NEGATIVE MG/DL — SIGNIFICANT CHANGE UP
HCT VFR BLD CALC: 41.6 % — SIGNIFICANT CHANGE UP (ref 34.5–45)
HGB BLD-MCNC: 13.2 G/DL — SIGNIFICANT CHANGE UP (ref 11.5–15.5)
IMM GRANULOCYTES NFR BLD AUTO: 0.6 % — SIGNIFICANT CHANGE UP (ref 0–1.5)
KETONES UR-MCNC: NEGATIVE — SIGNIFICANT CHANGE UP
LEUKOCYTE ESTERASE UR-ACNC: NEGATIVE — SIGNIFICANT CHANGE UP
LYMPHOCYTES # BLD AUTO: 2.48 K/UL — SIGNIFICANT CHANGE UP (ref 1–3.3)
LYMPHOCYTES # BLD AUTO: 23.2 % — SIGNIFICANT CHANGE UP (ref 13–44)
MCHC RBC-ENTMCNC: 28.1 PG — SIGNIFICANT CHANGE UP (ref 27–34)
MCHC RBC-ENTMCNC: 31.7 GM/DL — LOW (ref 32–36)
MCV RBC AUTO: 88.5 FL — SIGNIFICANT CHANGE UP (ref 80–100)
MONOCYTES # BLD AUTO: 1.22 K/UL — HIGH (ref 0–0.9)
MONOCYTES NFR BLD AUTO: 11.4 % — SIGNIFICANT CHANGE UP (ref 2–14)
NEUTROPHILS # BLD AUTO: 6.68 K/UL — SIGNIFICANT CHANGE UP (ref 1.8–7.4)
NEUTROPHILS NFR BLD AUTO: 62.5 % — SIGNIFICANT CHANGE UP (ref 43–77)
NITRITE UR-MCNC: NEGATIVE — SIGNIFICANT CHANGE UP
PH UR: 5 — SIGNIFICANT CHANGE UP (ref 5–8)
PLATELET # BLD AUTO: 314 K/UL — SIGNIFICANT CHANGE UP (ref 150–400)
POTASSIUM SERPL-MCNC: 4.2 MMOL/L — SIGNIFICANT CHANGE UP (ref 3.5–5.3)
POTASSIUM SERPL-SCNC: 4.2 MMOL/L — SIGNIFICANT CHANGE UP (ref 3.5–5.3)
PROT UR-MCNC: 30 MG/DL
RBC # BLD: 4.7 M/UL — SIGNIFICANT CHANGE UP (ref 3.8–5.2)
RBC # FLD: 16.2 % — HIGH (ref 10.3–14.5)
SODIUM SERPL-SCNC: 137 MMOL/L — SIGNIFICANT CHANGE UP (ref 135–145)
SP GR SPEC: 1.02 — SIGNIFICANT CHANGE UP (ref 1.01–1.02)
UROBILINOGEN FLD QL: NEGATIVE MG/DL — SIGNIFICANT CHANGE UP
WBC # BLD: 10.69 K/UL — HIGH (ref 3.8–10.5)
WBC # FLD AUTO: 10.69 K/UL — HIGH (ref 3.8–10.5)

## 2020-08-30 PROCEDURE — 99232 SBSQ HOSP IP/OBS MODERATE 35: CPT

## 2020-08-30 PROCEDURE — 71045 X-RAY EXAM CHEST 1 VIEW: CPT | Mod: 26

## 2020-08-30 RX ADMIN — APIXABAN 5 MILLIGRAM(S): 2.5 TABLET, FILM COATED ORAL at 23:24

## 2020-08-30 RX ADMIN — Medication 30 MILLIGRAM(S): at 06:45

## 2020-08-30 RX ADMIN — APIXABAN 5 MILLIGRAM(S): 2.5 TABLET, FILM COATED ORAL at 10:13

## 2020-08-30 RX ADMIN — QUETIAPINE FUMARATE 12.5 MILLIGRAM(S): 200 TABLET, FILM COATED ORAL at 23:25

## 2020-08-30 RX ADMIN — Medication 100 MILLIGRAM(S): at 10:14

## 2020-08-30 RX ADMIN — SPIRONOLACTONE 12.5 MILLIGRAM(S): 25 TABLET, FILM COATED ORAL at 10:14

## 2020-08-30 RX ADMIN — Medication 100 MILLIGRAM(S): at 23:24

## 2020-08-30 RX ADMIN — Medication 1 TABLET(S): at 10:14

## 2020-08-30 RX ADMIN — Medication 30 MILLIGRAM(S): at 17:18

## 2020-08-30 RX ADMIN — Medication 30 MILLIGRAM(S): at 11:02

## 2020-08-30 RX ADMIN — Medication 30 MILLIGRAM(S): at 23:25

## 2020-08-30 RX ADMIN — Medication 81 MILLIGRAM(S): at 10:14

## 2020-08-30 NOTE — PROGRESS NOTE ADULT - REASON FOR ADMISSION
worsening SOB

## 2020-08-30 NOTE — PROGRESS NOTE ADULT - SUBJECTIVE AND OBJECTIVE BOX
84yo/F with PMH chronic afib (discharged from [place] on 20 off AC due to high risk of falls but Eliquis resumed at full dose of 10mg BID at [facility] on 20), s/p TAVR, CAD s/p stent, chr diastolic CHF EF 60-65%, SCC, dementia presented with worsening SOB form [facility]. Patient is unable to provide any history as very confused and agitated at present. Patient found to be in acute CHF in ED and given Lasix 40mg IV. Telemetry- afib at 110-120's.   -  more awake and alert today , no further efvers  denies dysuria, no abd pain , no SOB or cough   Review of Systems:  Other Review of Systems: All other review of systems negative, except as noted in HPI  Physical Exam:  · Constitutional	NAD, alert , awke, follows commands   · Neck	No bruits; no thyromegaly or nodules  · Respiratory	detailed exam  · Respiratory Comments	unable to fully cooperate  · Cardiovascular	detailed exam  · Cardiovascular Details	irregular  rhythm  · Gastrointestinal	Soft, non-tender, no hepatosplenomegaly, normal bowel sounds  ·   · Extremities	No cyanosis, clubbing or edema  · Neurological	detailed exam        PHYSICAL EXAM:    Daily     Daily     ICU Vital Signs Last 24 Hrs  T(C): 36.7 (30 Aug 2020 09:16), Max: 37.2 (29 Aug 2020 16:30)  T(F): 98 (30 Aug 2020 09:16), Max: 99 (29 Aug 2020 16:30)  HR: 81 (30 Aug 2020 09:16) (76 - 106)  BP: 112/69 (30 Aug 2020 09:16) (100/54 - 115/68)  BP(mean): --  ABP: --  ABP(mean): --  RR: 16 (30 Aug 2020 09:16) (16 - 18)  SpO2: 91% (30 Aug 2020 09:16) (91% - 97%)                              13.2   10.69 )-----------( 314      ( 30 Aug 2020 07:51 )             41.6       CBC Full  -  ( 30 Aug 2020 07:51 )  WBC Count : 10.69 K/uL  RBC Count : 4.70 M/uL  Hemoglobin : 13.2 g/dL  Hematocrit : 41.6 %  Platelet Count - Automated : 314 K/uL  Mean Cell Volume : 88.5 fl  Mean Cell Hemoglobin : 28.1 pg  Mean Cell Hemoglobin Concentration : 31.7 gm/dL  Auto Neutrophil # : 6.68 K/uL  Auto Lymphocyte # : 2.48 K/uL  Auto Monocyte # : 1.22 K/uL  Auto Eosinophil # : 0.15 K/uL  Auto Basophil # : 0.10 K/uL  Auto Neutrophil % : 62.5 %  Auto Lymphocyte % : 23.2 %  Auto Monocyte % : 11.4 %  Auto Eosinophil % : 1.4 %  Auto Basophil % : 0.9 %      08    137  |  108  |  27<H>  ----------------------------<  94  4.2   |  22  |  0.63    Ca    9.1      30 Aug 2020 07:51                      Urinalysis Basic - ( 29 Aug 2020 23:55 )    Color: Yellow / Appearance: Clear / S.020 / pH: x  Gluc: x / Ketone: Negative  / Bili: Negative / Urobili: Negative mg/dL   Blood: x / Protein: 30 mg/dL / Nitrite: Negative   Leuk Esterase: Negative / RBC: 0-2 /HPF / WBC 0-2   Sq Epi: x / Non Sq Epi: Few / Bacteria: Few            MEDICATIONS  (STANDING):  apixaban 5 milliGRAM(s) Oral every 12 hours  aspirin enteric coated 81 milliGRAM(s) Oral daily  diltiazem    Tablet 30 milliGRAM(s) Oral every 6 hours  metoprolol tartrate 100 milliGRAM(s) Oral two times a day  multivitamin 1 Tablet(s) Oral daily  QUEtiapine 12.5 milliGRAM(s) Oral at bedtime  spironolactone 12.5 milliGRAM(s) Oral daily

## 2020-08-30 NOTE — PROGRESS NOTE ADULT - ASSESSMENT
8/30, 1 episode of fever 101 on 8/29, no further fever  Acute on chronic diastolic CHF exacerbation improved - clinically now compensated   Prior COVID infection/positive for abs, no PNA   Afib RVR , now rate controlled   PE eliquis started in [facility]   Hx Afib , TAVR, CADstent  SCC   Acute metabolic encephalopathy improved , mentation now at baseline ,   ecoli urinary colonisation/ UA wnl /no urinary symptoms      UA no UTI , CXR 8/29 no PNA , pulm vasc congestion improved   PT eval, recommended rehab , covid reswab on sunday per SW , for rehab on mon  stop lasix due to hypotension   continue meds for rate control  monitor off abx per ID   PT eval for discharge planning   i discussed with daughter elizabeth        #Acute on chronic diastolic CHF  # Prior covid exposure/abb positive    telemetry  S/p IV Lasix, will continue with 40mg BID IV  Cardio eval appreciatd   ECHO from previous admission [place]- EF 60-65%  Monitor lytes while on diuresis  COVID Ab (+) 8/1  COVID PCR (+) 8/24  CXR: Impression: small BILATERAL pleural effusions with underlying atelectasis#PE  Patient previously discharged off ELiquis due to high risk of falls  Eliquis resumed at [facility] on 8/14 at 10mg BIDWill continue 5mg BID as after 7 days of initial loading dose  CT angio: Slight defect along the periphery of the posterior right upper lobe segmental pulmonary artery may represent a focal acute versus chronic pulmonary embolus. Linear defect in the origin of the left upper lobe pulmonary artery may represent chronic thrombus or pulmonary web.    #Afib with RVR  S/p Metoprolol PO  Will continue BB and cardizem  Adjust dose further to control the rate  AC by Eliquis    #agitation likely 2 to sundowning  Haldol and Ativan prn  Increase Seroquel to 25mg QHS    #CAD s/p stent  #S/p TAVR  Continue Aspirin    #COVID pending, low suspicion    #Increased Nutrient Needs  1) add ensure enlive once daily and gelatein BID to optimize PO intake 2) daily wt checks to track/trend changes 3) consider checking vitamin D level 4) encourage oral supplements between meals to optimize PO intake#DVT proph- on Eliquis

## 2020-08-31 ENCOUNTER — TRANSCRIPTION ENCOUNTER (OUTPATIENT)
Age: 85
End: 2020-08-31

## 2020-08-31 VITALS
RESPIRATION RATE: 16 BRPM | DIASTOLIC BLOOD PRESSURE: 73 MMHG | HEART RATE: 90 BPM | OXYGEN SATURATION: 92 % | TEMPERATURE: 99 F | SYSTOLIC BLOOD PRESSURE: 102 MMHG

## 2020-08-31 LAB
BASOPHILS # BLD AUTO: 0.13 K/UL — SIGNIFICANT CHANGE UP (ref 0–0.2)
BASOPHILS NFR BLD AUTO: 1.5 % — SIGNIFICANT CHANGE UP (ref 0–2)
EOSINOPHIL # BLD AUTO: 0.26 K/UL — SIGNIFICANT CHANGE UP (ref 0–0.5)
EOSINOPHIL NFR BLD AUTO: 3.1 % — SIGNIFICANT CHANGE UP (ref 0–6)
HCT VFR BLD CALC: 41.2 % — SIGNIFICANT CHANGE UP (ref 34.5–45)
HGB BLD-MCNC: 13 G/DL — SIGNIFICANT CHANGE UP (ref 11.5–15.5)
IMM GRANULOCYTES NFR BLD AUTO: 0.6 % — SIGNIFICANT CHANGE UP (ref 0–1.5)
LYMPHOCYTES # BLD AUTO: 2.45 K/UL — SIGNIFICANT CHANGE UP (ref 1–3.3)
LYMPHOCYTES # BLD AUTO: 29.2 % — SIGNIFICANT CHANGE UP (ref 13–44)
MCHC RBC-ENTMCNC: 28.4 PG — SIGNIFICANT CHANGE UP (ref 27–34)
MCHC RBC-ENTMCNC: 31.6 GM/DL — LOW (ref 32–36)
MCV RBC AUTO: 90.2 FL — SIGNIFICANT CHANGE UP (ref 80–100)
MONOCYTES # BLD AUTO: 1.06 K/UL — HIGH (ref 0–0.9)
MONOCYTES NFR BLD AUTO: 12.6 % — SIGNIFICANT CHANGE UP (ref 2–14)
NEUTROPHILS # BLD AUTO: 4.45 K/UL — SIGNIFICANT CHANGE UP (ref 1.8–7.4)
NEUTROPHILS NFR BLD AUTO: 53 % — SIGNIFICANT CHANGE UP (ref 43–77)
PLATELET # BLD AUTO: 300 K/UL — SIGNIFICANT CHANGE UP (ref 150–400)
RBC # BLD: 4.57 M/UL — SIGNIFICANT CHANGE UP (ref 3.8–5.2)
RBC # FLD: 16.3 % — HIGH (ref 10.3–14.5)
SARS-COV-2 RNA SPEC QL NAA+PROBE: SIGNIFICANT CHANGE UP
WBC # BLD: 8.4 K/UL — SIGNIFICANT CHANGE UP (ref 3.8–10.5)
WBC # FLD AUTO: 8.4 K/UL — SIGNIFICANT CHANGE UP (ref 3.8–10.5)

## 2020-08-31 PROCEDURE — 99239 HOSP IP/OBS DSCHRG MGMT >30: CPT

## 2020-08-31 RX ORDER — APIXABAN 2.5 MG/1
1 TABLET, FILM COATED ORAL
Qty: 0 | Refills: 0 | DISCHARGE
Start: 2020-08-31

## 2020-08-31 RX ADMIN — Medication 81 MILLIGRAM(S): at 09:27

## 2020-08-31 RX ADMIN — Medication 30 MILLIGRAM(S): at 05:58

## 2020-08-31 RX ADMIN — APIXABAN 5 MILLIGRAM(S): 2.5 TABLET, FILM COATED ORAL at 09:27

## 2020-08-31 RX ADMIN — SPIRONOLACTONE 12.5 MILLIGRAM(S): 25 TABLET, FILM COATED ORAL at 09:28

## 2020-08-31 RX ADMIN — Medication 30 MILLIGRAM(S): at 14:21

## 2020-08-31 RX ADMIN — Medication 1 TABLET(S): at 09:28

## 2020-08-31 RX ADMIN — Medication 100 MILLIGRAM(S): at 09:28

## 2020-08-31 NOTE — DISCHARGE NOTE PROVIDER - CARE PROVIDERS DIRECT ADDRESSES
[email or web address]

## 2020-08-31 NOTE — DISCHARGE NOTE PROVIDER - HOSPITAL COURSE
84yo/F with PMH chronic afib (discharged from [place] on 8/6/20 off AC due to high risk of falls but Eliquis resumed at full dose of 10mg BID at [facility] on 8/14/20), s/p TAVR, CAD s/p stent, chr diastolic CHF EF 60-65%, SCC, dementia presented with worsening SOB form [facility]. Patient is unable to provide any history as very confused and agitated at present. Patient found to be in acute CHF in ED and given Lasix 40mg IV. Telemetry- afib at 110-120's.     8/31-  awake and alert  , no further efvers  denies dysuria, no abd pain , no SOB or cough     Review of Systems:    Other Review of Systems: All other review of systems negative, except as noted in HPI    Physical Exam:    · Constitutional	NAD, alert , awke, follows commands     · Neck	No bruits; no thyromegaly or nodules    · Respiratory	detailed exam    · Respiratory Comments	unable to fully cooperate    · Cardiovascular	detailed exam    · Cardiovascular Details	irregular  rhythm    · Gastrointestinal	Soft, non-tender, no hepatosplenomegaly, normal bowel sounds    ·     · Extremities	No cyanosis, clubbing or edema    · Neurological	detailed exam        A/P    pt admitted with     Acute on chronic diastolic CHF exacerbation improved - clinically now compensated  treated with IV lasix     Prior COVID infection/positive for  covid antibodies, no PNA     Afib RVR , now rate controlled     PE eliquis started in [facility]     Hx Afib , TAVR, CADstent    SCC     Acute metabolic encephalopathy improved , mentation now at baseline ,     ecoli urinary colonisation/ UA wnl /no urinary symptoms     8/30, 1 episode of fever 101 on 8/29, no further fever         8/29UA no UTI , CXR 8/29 no PNA shows R lung density due to healing callus of right 7th rib fx  ,     pulm vasc congestion improved , pulse ox room air 95    PT eval, recommended rehab , covid reswab on sunday per SW , for rehab on mon     lasix  stopped due to hypotension-I discussed with cardio dr crowley, continue home dose aldactone     continue meds for rate control    monitor off abx per ID     TX recommended rehab     i discussed with daughter elizabeth                #PE    Patient previously discharged off ELiquis due to high risk of falls    Eliquis resumed at [facility] on 8/14 at 10mg BIDWill continue 5mg BID as after 7 days of initial loading dose    CT angio: Slight defect along the periphery of the posterior right upper lobe segmental pulmonary artery may represent a focal acute versus chronic pulmonary embolus. Linear defect in the origin of the left upper lobe pulmonary artery may represent chronic thrombus or pulmonary web.        #Afib with RVR, now rate controlled         Will continue BB and cardizem    f/u with cardiology as outpatient     AC by Eliquis        #agitation likely 2 to sundowning/resolved     continue  home dose seroquel  low dose         #CAD s/p stent    #S/p TAVR    Continue Aspirin                #Increased Nutrient Needs    1) add ensure enlive once daily and gelatein BID to optimize PO intake 2) daily wt checks to track/trend changes 3) consider checking vitamin D level 4) encourage oral supplements between meals to optimize PO intake         discharge time 47mins    I discussed with daughter elizabeth

## 2020-08-31 NOTE — DISCHARGE NOTE PROVIDER - NSDCCPCAREPLAN_GEN_ALL_CORE_FT
PRINCIPAL DISCHARGE DIAGNOSIS  Diagnosis: CHF exacerbation  Assessment and Plan of Treatment: you were treated for acute on chronic diastolic heart failure with intravenous lasix, lasix was then stopped to avoid hypotension, now continues aldactone as per cardiology, follow up with cardiology as outpatient in 1 week   you are also on eliquis for pulmonary embolism, please follow up with primary doctor and pulmonary  You are positive for COVID antibodies which means you had prior covid infection    if you have any fever or diffixulty breathing call 911 or return to ED      SECONDARY DISCHARGE DIAGNOSES  Diagnosis: Pulmonary embolism  Assessment and Plan of Treatment:

## 2020-08-31 NOTE — DISCHARGE NOTE NURSING/CASE MANAGEMENT/SOCIAL WORK - PATIENT PORTAL LINK FT
You can access the FollowMyHealth Patient Portal offered by [facility] by registering at the following website: [email or web address]. By joining [facility]’s FollowMyHealth portal, you will also be able to view your health information using other applications (apps) compatible with our system.

## 2020-08-31 NOTE — DISCHARGE NOTE PROVIDER - NSDCMRMEDTOKEN_GEN_ALL_CORE_FT
acetaminophen 325 mg oral tablet: 2 tabs orally every 4 hours as needed for bodyaches, headache  albuterol 5 mg/mL (0.5%) inhalation solution: 0.5 milliliter(s) inhaled every 6 hours, As Needed - for shortness of breath and/or wheezing  Aldactone 25 mg oral tablet: 0.5 tab(s) orally once a day  apixaban 5 mg oral tablet: 1 tab(s) orally every 12 hours  aspirin 81 mg oral delayed release tablet: 1 tab(s) orally once a day  Cardizem 30 mg oral tablet: 1 tab(s) orally every 6 hours  lidocaine 4% patch: Apply 1 patch topically to affected area every 12 hours as needed for pain, 12 hours on, 12 off  Metoprolol Tartrate 100 mg oral tablet: 1 tab(s) orally 2 times a day   ***hold for sbp &lt; 100 or apical &lt; 60***  Milk of Magnesia 8% oral suspension: 30 milliliter(s) orally once a day, As Needed  Os-John 500 (1250 mg calcium carbonate) oral tablet: 1 tab(s) orally once a day  SEROquel 25 mg oral tablet: 0.5 tab(s) orally once a day (at bedtime)  Vitamin D3 50,000 intl units (1250 mcg) oral capsule: 1 cap(s) orally once a week

## 2020-08-31 NOTE — DISCHARGE NOTE PROVIDER - CARE PROVIDER_API CALL
Moise Rose  CARDIOVASCULAR DISEASE  [address] [place] [address]  Phone: [phone]  Fax: [phone]  Follow Up Time:     primary doctor,   Phone: (   )    -  Fax: (   )    -  Follow Up Time:

## 2020-09-02 DIAGNOSIS — Z86.19 PERSONAL HISTORY OF OTHER INFECTIOUS AND PARASITIC DISEASES: ICD-10-CM

## 2020-09-02 DIAGNOSIS — Z86.718 PERSONAL HISTORY OF OTHER VENOUS THROMBOSIS AND EMBOLISM: ICD-10-CM

## 2020-09-02 DIAGNOSIS — F05 DELIRIUM DUE TO KNOWN PHYSIOLOGICAL CONDITION: ICD-10-CM

## 2020-09-02 DIAGNOSIS — G93.41 METABOLIC ENCEPHALOPATHY: ICD-10-CM

## 2020-09-02 DIAGNOSIS — I50.33 ACUTE ON CHRONIC DIASTOLIC (CONGESTIVE) HEART FAILURE: ICD-10-CM

## 2020-09-02 DIAGNOSIS — Z95.5 PRESENCE OF CORONARY ANGIOPLASTY IMPLANT AND GRAFT: ICD-10-CM

## 2020-09-02 DIAGNOSIS — Z79.01 LONG TERM (CURRENT) USE OF ANTICOAGULANTS: ICD-10-CM

## 2020-09-02 DIAGNOSIS — Z66 DO NOT RESUSCITATE: ICD-10-CM

## 2020-09-02 DIAGNOSIS — Z95.2 PRESENCE OF PROSTHETIC HEART VALVE: ICD-10-CM

## 2020-09-02 DIAGNOSIS — I25.10 ATHEROSCLEROTIC HEART DISEASE OF NATIVE CORONARY ARTERY WITHOUT ANGINA PECTORIS: ICD-10-CM

## 2020-09-02 DIAGNOSIS — I26.99 OTHER PULMONARY EMBOLISM WITHOUT ACUTE COR PULMONALE: ICD-10-CM

## 2020-09-02 DIAGNOSIS — I95.2 HYPOTENSION DUE TO DRUGS: ICD-10-CM

## 2020-09-02 DIAGNOSIS — Z79.82 LONG TERM (CURRENT) USE OF ASPIRIN: ICD-10-CM

## 2020-09-02 DIAGNOSIS — I11.0 HYPERTENSIVE HEART DISEASE WITH HEART FAILURE: ICD-10-CM

## 2020-09-02 DIAGNOSIS — I48.20 CHRONIC ATRIAL FIBRILLATION, UNSPECIFIED: ICD-10-CM

## 2020-09-02 DIAGNOSIS — R45.1 RESTLESSNESS AND AGITATION: ICD-10-CM

## 2020-09-09 ENCOUNTER — APPOINTMENT (OUTPATIENT)
Dept: NEUROLOGY | Facility: CLINIC | Age: 85
End: 2020-09-09

## 2020-12-23 PROBLEM — Z87.09 HISTORY OF ACUTE BRONCHITIS: Status: RESOLVED | Noted: 2020-01-03 | Resolved: 2020-12-23

## 2021-03-08 NOTE — ED ADULT NURSE NOTE - NSSISCREENINGQ4_ED_A_ED
From: Alberto Pittman  To: Kaleigh Allen DO  Sent: 3/8/2021 10:38 AM CST  Subject: Referral Request    Good morning Dr Joseph Jenkins. I will be due for my mammogram on May 5th I would like to get the order in so I can schedule the appointment. Thank you.
See [facility] message below:    Pt requesting order for mammogram    Last Mammo 5/5/20  CONCLUSION:     DIAGNOSTIC CATEGORY 2--BENIGN FINDING:         RECOMMENDATIONS:     ROUTINE MAMMOGRAM AND CLINICAL EVALUATION IN 12 MONTHS. Order pended.
No

## 2021-07-02 NOTE — PHARMACOTHERAPY INTERVENTION NOTE - COMMENTS
Referenced medication list given by daughter and called pharmacy. As per pharmacy, patient has not filled clopidogrel since 2019. As per daughter, patient was started on donepezil 5 mG qday, but had hallucinations, so it was stopped after 2 days
Atrial fibrillation
priapism since 11am  took 120 mg Sudafed without relief

## 2022-01-05 NOTE — ED PROVIDER NOTE - NS ED MD TWO NIGHTS YN
PMDP checked for appropriateness and prescription refilled.    
Per patient there were no changes to med, dosage, sig or quantity.   The medication(s) set up as pending and waiting for your approval.   Preferred Pharmacy has been set up and verified   
Refill requested for:     Vyvanse 60mg  Last filled on:     12-2-2021    Last office visit:     11-4-2021  Pending office visit 6-2-2022    Medication can not be filled by protocol. Physician authorization required.  
Yes

## 2022-12-16 NOTE — DIETITIAN INITIAL EVALUATION ADULT. - OTHER INFO
Female
84yo/F with PMH chronic afib (discharged from [place] on 8/6/20 off AC due to high risk of falls but Eliquis resumed at full dose of 10mg BID at [facility] on 8/14/20), s/p TAVR, CAD s/p stent, chr diastolic CHF EF 60-65%, SCC, dementia presented with worsening SOB form [facility].  Pt admitted with acute on chronic diastolic CHF s/p IV lasic, PE, Afib with RVR, agitation 2/2 sundowning, COVID pending.    *MOLST: DNR/DNI

## 2023-01-29 NOTE — H&P PST ADULT - HISTORY OF PRESENT ILLNESS
This is an 84 yo female with Afib newly diagnosed.  PMHx includes HLD, calcific aortic stenosis  and scoliosis.  Patient had recent DANIA/CV, was unsuccessful and remains in Afib with RVR. Denies chest pain, sob, palps.    DANIA: < from: DANIA Echo Doppler (01.04.19 @ 10:26) >  Summary:   1. Left ventricular ejection fraction, by visual estimation, is 60 to   65%.   2. Moderately increased LV wall thickness.   3. Normal left ventricular internal cavity size.   4. The left ventricular diastolic function could not be assessed in this   study.   5. There is moderate concentric left ventricular hypertrophy.   6. Mildly increased RV wall thickness.   7. Moderately enlarged right ventricle.   8. Heavily calcified AV with virtually immobile right and noncoronary   cusps, movement of LCC only. Severe As on inspection with VETO 0.6-0.7 cm2   on planimetry. DOI 0.27. Peak gradient 36 mm hg c/w low gradient severe   AS. trace AI.   9. Degenerative mitral valve.  10. Mild mitral annular calcification.  11. Moderate mitral valveregurgitation.  12. Moderately enlarged LA max diameter 4.7 cm, enlarged hypokinetic MEGAN.   No evidence of EC nor thrombus in LA or MEGAN.  13. Color flow doppler and intravenous injection of agitated saline   demonstrates the presence of an intact intra atrial septum.  14. Poor leaflet coaptation secondary to enlarged RV / TVA. Severe TR   with underestimated PAP 38 mm hg.  15. Severe atheromatous changes of aortic root.  16. Simple atheroma seen in the aortic arch and descending aorta.  17. Thereis severe aortic root calcification.  18. In light of absence of intracardiac thrombus pt underwent attempt at   DCCV 200 J X2 without successful conversion to SR. Pt remained in AF with   CVR.    [id number] Leia Murray MD, Electronically signed on 1/4/2019 at 11:15:20   AM         < end of copied text >      Echocardiogram 2018:  LV size and function normal EF 65%, moderate mitral calcification with mild to moderate MR, severe calcific aortic stenosis, peak gradient  35 and mean gradient of 8with estimated valve area 0.9.      Cardiac Catheterization 12/6/2018:  Low dose diuretic with elevated filling pressures, AF with rapid VR, low gradient, severe RCA and moderate LAD stenosis, preserved EF. Patient seen and case discussed at bedside.  I have reviewed the physical, radiological and laboratory findings with the team. I was physically present for the key portions of the evaluation and management (E/M) service provided.  Patient is in critical condition. This patient requires ICU care including continuous monitoring and frequent vital sign assessment due to significant risk of cardiorespiratory compromise or decompensation outside of the NICU.    Baby cathie Cantu" is a now 32 do ex 28 week infant with active issues of evolving CLD, apnea of prematurity, nutritional needs, immature thermoregulation, NG tube feeds.    Resp: RDS/evolving chronic lung disease; Continue PEEP 5 on CPAP; titrate FiO2 to maintain appropriate saturations. Provide pulmonary toilet for secretions as needed. Continue caffeine for apnea of prematurity.     CV:  Continue cardiopulmonary monitoring.   Echocardiogram: 1/18- PFO, no follow up required.    FEN/GI: Tolerating feeds of Prolacta RTF 28 bronson, but with poor weight gain over past 1-2 weeks.  Prolacta cream volume increased to 8 mL on 1/27; give 24cc q3.  Will monitor weight gain closely.  If patient does not have adequate weight gain over next 2 days, will change to DFBM with HMF.    ID: Completed 36 hrs of empiric treatment with Amp/Gent for presumed early onset sepsis at OSH. Blood cultures without growth. No current infectious concerns. Follow clinically.      Heme: Anemia of prematurity s/p PRBC 1/15. 1/23 Hct 31.7.  Next HCT 2/6 or prn  Bilirubin downtrending; further bilirubin prn.      Neuro: Nonfocal exam. Mild dilation of left lateral ventricle on HUS from OSH (12/30; 1 /4).  Repeat 1/9 - no dilation seen.  No IVH. Next HUS at term corrected or dc.     Ophtho:  for exam 2/8    PICC 12/30 -  1/9    Social: High risk social situation (Maternal anxiety, depression, BP Disorder, No PNC and currently in shelter); Social work involved and referral made to [facility]. This is an 84 yo female with Afib newly diagnosed.  PMHx includes HLD, calcific aortic stenosis  and scoliosis.  Patient had recent DANIA/CV, was unsuccessful and remains in Afib with RVR. Denies chest pain, sob, palps.    Bleeding risk score: 2.5%    DANIA: < from: DANIA Echo Doppler (01.04.19 @ 10:26) >  Summary:   1. Left ventricular ejection fraction, by visual estimation, is 60 to   65%.   2. Moderately increased LV wall thickness.   3. Normal left ventricular internal cavity size.   4. The left ventricular diastolic function could not be assessed in this   study.   5. There is moderate concentric left ventricular hypertrophy.   6. Mildly increased RV wall thickness.   7. Moderately enlarged right ventricle.   8. Heavily calcified AV with virtually immobile right and noncoronary   cusps, movement of LCC only. Severe As on inspection with VETO 0.6-0.7 cm2   on planimetry. DOI 0.27. Peak gradient 36 mm hg c/w low gradient severe   AS. trace AI.   9. Degenerative mitral valve.  10. Mild mitral annular calcification.  11. Moderate mitral valveregurgitation.  12. Moderately enlarged LA max diameter 4.7 cm, enlarged hypokinetic MEGAN.   No evidence of EC nor thrombus in LA or MEGAN.  13. Color flow doppler and intravenous injection of agitated saline   demonstrates the presence of an intact intra atrial septum.  14. Poor leaflet coaptation secondary to enlarged RV / TVA. Severe TR   with underestimated PAP 38 mm hg.  15. Severe atheromatous changes of aortic root.  16. Simple atheroma seen in the aortic arch and descending aorta.  17. Thereis severe aortic root calcification.  18. In light of absence of intracardiac thrombus pt underwent attempt at   DCCV 200 J X2 without successful conversion to SR. Pt remained in AF with   CVR.    [id number] Leia Murray MD, Electronically signed on 1/4/2019 at 11:15:20   AM         < end of copied text >      Echocardiogram 2018:  LV size and function normal EF 65%, moderate mitral calcification with mild to moderate MR, severe calcific aortic stenosis, peak gradient  35 and mean gradient of 8with estimated valve area 0.9.      Cardiac Catheterization 12/6/2018:  Low dose diuretic with elevated filling pressures, AF with rapid VR, low gradient, severe RCA and moderate LAD stenosis, preserved EF.

## 2023-03-29 NOTE — H&P PST ADULT - NSANTHOSAYNRD_GEN_A_CORE
No. SARAN screening performed.  STOP BANG Legend: 0-2 = LOW Risk; 3-4 = INTERMEDIATE Risk; 5-8 = HIGH Risk Valtrex Counseling: I discussed with the patient the risks of valacyclovir including but not limited to kidney damage, nausea, vomiting and severe allergy.  The patient understands that if the infection seems to be worsening or is not improving, they are to call.

## 2023-04-17 NOTE — ED PROVIDER NOTE - DISPOSITION TYPE
RX refill request sent via e-scribe from [email or web address] in [facility]. Requesting a refill on Simvastatin 20 mg tablet, QTY 90 with 3 refills sent in.    Verified pt's chart, no changes at this time.    Next appointment scheduled on 9/12/2023 with Dr Irvign.    DISCHARGE

## 2023-07-18 NOTE — ED ADULT NURSE NOTE - ALCOHOL PRE SCREEN (AUDIT - C)
Allyson Bar is a 70 year old female presenting for a 3 month med check.     Patient would like communication of their results via:   Cell Phone:   Telephone Information:   Mobile [phone]     Okay to leave a message containing results? Yes     Medications reviewed and updated.Denies known Latex allergy or symptoms of Latex sensitivity.    Health Maintenance Due   Topic Date Due   • Diabetes Eye Exam  03/01/2021       Patient is due for topics as listed above but is not proceeding with Diabetes Eye Exam at this time.     Recent PHQ 2/9 Score    PHQ 2:  PHQ 2 Score Adult PHQ 2 Score Adult PHQ 2 Interpretation Little interest or pleasure in activity?   4/5/2023   7:27 AM 1 No further screening needed 0        Statement Selected

## 2024-01-01 NOTE — DISCHARGE NOTE NURSING/CASE MANAGEMENT/SOCIAL WORK - NSDCPEELIQUISFU_GEN_ALL_CORE
Go for blood tests as directed. Your doctor will do lab tests at regular visits to monitor the effects of this medicine. Please follow up with your doctor and keep your health care provider appointments.
-'s hands and feet may be bluish in color for a few days.

## 2025-01-31 NOTE — ED ADULT TRIAGE NOTE - MODE OF ARRIVAL
coordination of care/discharge planning/patient meets high risk criteria (i.e.: STARS admit., readmit w/in 30 days) Walk in [employer]

## 2025-07-22 NOTE — ED PROVIDER NOTE - PMH
Aortic stenosis    Atrial fibrillation    CAD (coronary artery disease)    Hypertension    Kyphoscoliosis    Mitral valve disease    SCC (squamous cell carcinoma)    Varicose vein of leg None